# Patient Record
Sex: FEMALE | Race: WHITE | NOT HISPANIC OR LATINO | Employment: OTHER | URBAN - METROPOLITAN AREA
[De-identification: names, ages, dates, MRNs, and addresses within clinical notes are randomized per-mention and may not be internally consistent; named-entity substitution may affect disease eponyms.]

---

## 2023-08-12 ENCOUNTER — HOSPITAL ENCOUNTER (OUTPATIENT)
Dept: DATA CONVERSION | Facility: HOSPITAL | Age: 73
Discharge: HOME | End: 2023-08-12

## 2023-08-12 DIAGNOSIS — S93.402A SPRAIN OF UNSPECIFIED LIGAMENT OF LEFT ANKLE, INITIAL ENCOUNTER: ICD-10-CM

## 2023-08-12 DIAGNOSIS — M79.605 PAIN IN LEFT LEG: ICD-10-CM
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
## 2023-08-12 DIAGNOSIS — M25.572 PAIN IN LEFT ANKLE AND JOINTS OF LEFT FOOT: ICD-10-CM

## 2023-08-12 DIAGNOSIS — M25.552 PAIN IN LEFT HIP: ICD-10-CM

## 2023-08-12 DIAGNOSIS — F41.9 ANXIETY DISORDER, UNSPECIFIED: ICD-10-CM

## 2023-08-12 DIAGNOSIS — G43.909 MIGRAINE, UNSPECIFIED, NOT INTRACTABLE, WITHOUT STATUS MIGRAINOSUS: ICD-10-CM

## 2023-08-12 DIAGNOSIS — M25.561 PAIN IN RIGHT KNEE: ICD-10-CM

## 2023-08-12 DIAGNOSIS — E11.9 TYPE 2 DIABETES MELLITUS WITHOUT COMPLICATIONS (MULTI): ICD-10-CM

## 2023-08-12 DIAGNOSIS — M25.562 PAIN IN LEFT KNEE: ICD-10-CM

## 2023-08-21 ENCOUNTER — HOSPITAL ENCOUNTER (OUTPATIENT)
Dept: DATA CONVERSION | Facility: HOSPITAL | Age: 73
End: 2023-08-21

## 2023-08-21 DIAGNOSIS — M19.011 PRIMARY OSTEOARTHRITIS, RIGHT SHOULDER: ICD-10-CM

## 2023-08-24 ENCOUNTER — HOSPITAL ENCOUNTER (OUTPATIENT)
Dept: DATA CONVERSION | Facility: HOSPITAL | Age: 73
Discharge: HOME | End: 2023-08-24
Payer: MEDICARE

## 2023-08-24 DIAGNOSIS — M19.011 PRIMARY OSTEOARTHRITIS, RIGHT SHOULDER: ICD-10-CM

## 2023-09-13 ENCOUNTER — HOSPITAL ENCOUNTER (OUTPATIENT)
Dept: DATA CONVERSION | Facility: HOSPITAL | Age: 73
Discharge: HOME | End: 2023-09-13
Payer: MEDICARE

## 2023-09-13 DIAGNOSIS — R07.9 CHEST PAIN, UNSPECIFIED: ICD-10-CM

## 2023-09-13 DIAGNOSIS — R05.9 COUGH, UNSPECIFIED: ICD-10-CM

## 2023-09-14 ENCOUNTER — HOSPITAL ENCOUNTER (OUTPATIENT)
Dept: DATA CONVERSION | Facility: HOSPITAL | Age: 73
End: 2023-09-14

## 2023-09-14 DIAGNOSIS — R10.13 EPIGASTRIC PAIN: ICD-10-CM

## 2023-09-14 LAB
FLU A RESULT: NOT DETECTED
FLU B RESULT: NOT DETECTED
SARS-COV-2 RESULT: DETECTED

## 2023-09-25 ENCOUNTER — HOSPITAL ENCOUNTER (OUTPATIENT)
Dept: DATA CONVERSION | Facility: HOSPITAL | Age: 73
End: 2023-09-25

## 2023-09-25 DIAGNOSIS — M19.011 PRIMARY OSTEOARTHRITIS, RIGHT SHOULDER: ICD-10-CM

## 2023-09-27 PROBLEM — M19.011 ARTHRITIS OF RIGHT GLENOHUMERAL JOINT: Status: ACTIVE | Noted: 2023-09-27

## 2023-09-27 PROBLEM — F41.9 ANXIETY: Status: ACTIVE | Noted: 2023-09-27

## 2023-09-27 PROBLEM — M19.90 OSTEOARTHRITIS: Status: ACTIVE | Noted: 2023-09-27

## 2023-09-27 PROBLEM — G89.29 CHRONIC PAIN: Status: ACTIVE | Noted: 2023-09-27

## 2023-09-27 RX ORDER — LISINOPRIL 20 MG/1
20 TABLET ORAL DAILY
COMMUNITY
End: 2023-10-23

## 2023-09-27 RX ORDER — CYCLOBENZAPRINE HCL 5 MG
5 TABLET ORAL 3 TIMES DAILY PRN
COMMUNITY
Start: 2023-01-13

## 2023-09-27 RX ORDER — CYCLOSPORINE 0.5 MG/ML
1 EMULSION OPHTHALMIC 2 TIMES DAILY PRN
COMMUNITY
Start: 2023-09-08

## 2023-09-27 RX ORDER — OMEPRAZOLE 20 MG/1
20 TABLET, DELAYED RELEASE ORAL 2 TIMES DAILY
COMMUNITY

## 2023-09-27 RX ORDER — DICLOFENAC SODIUM 10 MG/G
4 GEL TOPICAL 4 TIMES DAILY PRN
COMMUNITY
Start: 2023-01-17

## 2023-09-27 RX ORDER — GABAPENTIN 800 MG/1
800 TABLET ORAL 3 TIMES DAILY
COMMUNITY
Start: 2023-08-22 | End: 2023-12-08 | Stop reason: ALTCHOICE

## 2023-09-27 RX ORDER — BISACODYL 10 MG/1
10 SUPPOSITORY RECTAL DAILY
COMMUNITY
Start: 2022-11-18 | End: 2024-01-26 | Stop reason: WASHOUT

## 2023-09-27 RX ORDER — PREGABALIN 75 MG/1
75 CAPSULE ORAL 3 TIMES DAILY
COMMUNITY
Start: 2023-02-03 | End: 2023-10-23 | Stop reason: ALTCHOICE

## 2023-09-27 RX ORDER — TOPIRAMATE 25 MG/1
TABLET ORAL
COMMUNITY
Start: 2023-05-25

## 2023-09-27 RX ORDER — TRAZODONE HYDROCHLORIDE 50 MG/1
50 TABLET ORAL NIGHTLY PRN
COMMUNITY
Start: 2022-10-02 | End: 2023-10-23 | Stop reason: ALTCHOICE

## 2023-09-27 RX ORDER — ESTRADIOL 0.1 MG/G
CREAM VAGINAL
COMMUNITY
Start: 2023-06-27

## 2023-09-27 RX ORDER — SERTRALINE HYDROCHLORIDE 50 MG/1
50 TABLET, FILM COATED ORAL DAILY
COMMUNITY
Start: 2023-09-06 | End: 2023-10-11

## 2023-09-27 RX ORDER — MELOXICAM 15 MG/1
15 TABLET ORAL DAILY
COMMUNITY
End: 2023-10-23 | Stop reason: ALTCHOICE

## 2023-09-27 RX ORDER — TIZANIDINE 4 MG/1
4 TABLET ORAL 2 TIMES DAILY PRN
Status: ON HOLD | COMMUNITY
Start: 2023-09-05 | End: 2023-11-03 | Stop reason: ALTCHOICE

## 2023-09-27 RX ORDER — PANTOPRAZOLE SODIUM 40 MG/1
40 TABLET, DELAYED RELEASE ORAL DAILY PRN
COMMUNITY
Start: 2023-06-16 | End: 2023-10-23 | Stop reason: ALTCHOICE

## 2023-09-27 RX ORDER — GABAPENTIN 300 MG/1
300 CAPSULE ORAL 2 TIMES DAILY
COMMUNITY
End: 2023-10-23 | Stop reason: ALTCHOICE

## 2023-09-27 RX ORDER — NARATRIPTAN 2.5 MG/1
2.5 TABLET ORAL ONCE AS NEEDED
COMMUNITY
Start: 2023-05-10

## 2023-09-29 ENCOUNTER — HOSPITAL ENCOUNTER (OUTPATIENT)
Dept: DATA CONVERSION | Facility: HOSPITAL | Age: 73
Discharge: HOME | End: 2023-09-29
Payer: MEDICARE

## 2023-09-29 DIAGNOSIS — R07.81 PLEURODYNIA: ICD-10-CM

## 2023-10-03 ENCOUNTER — HOSPITAL ENCOUNTER (OUTPATIENT)
Dept: RADIOLOGY | Facility: HOSPITAL | Age: 73
Discharge: HOME | End: 2023-10-03
Payer: MEDICARE

## 2023-10-03 DIAGNOSIS — R10.13 EPIGASTRIC PAIN: ICD-10-CM

## 2023-10-03 PROCEDURE — 2500000001 HC RX 250 WO HCPCS SELF ADMINISTERED DRUGS (ALT 637 FOR MEDICARE OP): Performed by: RADIOLOGY

## 2023-10-03 PROCEDURE — 74248 X-RAY SM INT F-THRU STD: CPT

## 2023-10-03 RX ADMIN — BARIUM SULFATE 355 ML: 960 POWDER, FOR SUSPENSION ORAL at 10:45

## 2023-10-06 DIAGNOSIS — F41.9 ANXIETY: ICD-10-CM

## 2023-10-10 NOTE — TELEPHONE ENCOUNTER
Rx Request received.   Rx needed [facility]  Pharmacy [facility] [place]  Last appointment 9/29/2023  PCP ARTEM WILKINS on 10/10/23 at 2:59 PM.

## 2023-10-11 RX ORDER — SERTRALINE HYDROCHLORIDE 50 MG/1
50 TABLET, FILM COATED ORAL DAILY
Qty: 90 TABLET | Refills: 1 | Status: SHIPPED | OUTPATIENT
Start: 2023-10-11 | End: 2024-03-22

## 2023-10-12 ENCOUNTER — APPOINTMENT (OUTPATIENT)
Dept: PHYSICAL THERAPY | Facility: CLINIC | Age: 73
End: 2023-10-12

## 2023-10-23 ENCOUNTER — LAB (OUTPATIENT)
Dept: LAB | Facility: LAB | Age: 73
End: 2023-10-23
Payer: MEDICARE

## 2023-10-23 ENCOUNTER — ANCILLARY PROCEDURE (OUTPATIENT)
Dept: PREADMISSION TESTING | Facility: HOSPITAL | Age: 73
End: 2023-10-23
Payer: MEDICARE

## 2023-10-23 VITALS
OXYGEN SATURATION: 95 % | WEIGHT: 150.79 LBS | HEART RATE: 100 BPM | TEMPERATURE: 96.8 F | BODY MASS INDEX: 26.72 KG/M2 | SYSTOLIC BLOOD PRESSURE: 138 MMHG | DIASTOLIC BLOOD PRESSURE: 107 MMHG | HEIGHT: 63 IN

## 2023-10-23 DIAGNOSIS — Z01.818 PREOPERATIVE TESTING: ICD-10-CM

## 2023-10-23 DIAGNOSIS — Z01.818 PREOPERATIVE TESTING: Primary | ICD-10-CM

## 2023-10-23 LAB
ANION GAP SERPL CALC-SCNC: 10 MMOL/L
APPEARANCE UR: ABNORMAL
APPEARANCE UR: ABNORMAL
BACTERIA #/AREA URNS AUTO: ABNORMAL /HPF
BACTERIA #/AREA URNS AUTO: ABNORMAL /HPF
BILIRUB UR STRIP.AUTO-MCNC: NEGATIVE MG/DL
BILIRUB UR STRIP.AUTO-MCNC: NEGATIVE MG/DL
BUN SERPL-MCNC: 26 MG/DL (ref 8–25)
CALCIUM SERPL-MCNC: 9.3 MG/DL (ref 8.5–10.4)
CAOX CRY #/AREA UR COMP ASSIST: ABNORMAL /HPF
CAOX CRY #/AREA UR COMP ASSIST: ABNORMAL /HPF
CHLORIDE SERPL-SCNC: 102 MMOL/L (ref 97–107)
CO2 SERPL-SCNC: 26 MMOL/L (ref 24–31)
COLOR UR: ABNORMAL
COLOR UR: ABNORMAL
CREAT SERPL-MCNC: 1 MG/DL (ref 0.4–1.6)
ERYTHROCYTE [DISTWIDTH] IN BLOOD BY AUTOMATED COUNT: 13.9 % (ref 11.5–14.5)
GFR SERPL CREATININE-BSD FRML MDRD: 59 ML/MIN/1.73M*2
GLUCOSE SERPL-MCNC: 89 MG/DL (ref 65–99)
GLUCOSE UR STRIP.AUTO-MCNC: NORMAL MG/DL
GLUCOSE UR STRIP.AUTO-MCNC: NORMAL MG/DL
HCT VFR BLD AUTO: 38.8 % (ref 36–46)
HGB BLD-MCNC: 12.3 G/DL (ref 12–16)
HOLD SPECIMEN: NORMAL
HYALINE CASTS #/AREA URNS AUTO: ABNORMAL /LPF
HYALINE CASTS #/AREA URNS AUTO: ABNORMAL /LPF
KETONES UR STRIP.AUTO-MCNC: NEGATIVE MG/DL
KETONES UR STRIP.AUTO-MCNC: NEGATIVE MG/DL
LEUKOCYTE ESTERASE UR QL STRIP.AUTO: ABNORMAL
LEUKOCYTE ESTERASE UR QL STRIP.AUTO: ABNORMAL
MCH RBC QN AUTO: 30.4 PG (ref 26–34)
MCHC RBC AUTO-ENTMCNC: 31.7 G/DL (ref 32–36)
MCV RBC AUTO: 96 FL (ref 80–100)
NITRITE UR QL STRIP.AUTO: ABNORMAL
NITRITE UR QL STRIP.AUTO: ABNORMAL
NRBC BLD-RTO: 0 /100 WBCS (ref 0–0)
PH UR STRIP.AUTO: 6 [PH]
PH UR STRIP.AUTO: 6 [PH]
PLATELET # BLD AUTO: 339 X10*3/UL (ref 150–450)
PMV BLD AUTO: 9.9 FL (ref 7.5–11.5)
POTASSIUM SERPL-SCNC: 4.7 MMOL/L (ref 3.4–5.1)
PROT UR STRIP.AUTO-MCNC: ABNORMAL MG/DL
PROT UR STRIP.AUTO-MCNC: ABNORMAL MG/DL
RBC # BLD AUTO: 4.04 X10*6/UL (ref 4–5.2)
RBC # UR STRIP.AUTO: NEGATIVE /UL
RBC # UR STRIP.AUTO: NEGATIVE /UL
RBC #/AREA URNS AUTO: ABNORMAL /HPF
RBC #/AREA URNS AUTO: ABNORMAL /HPF
SODIUM SERPL-SCNC: 138 MMOL/L (ref 133–145)
SP GR UR STRIP.AUTO: 1.02
SP GR UR STRIP.AUTO: 1.02
SQUAMOUS #/AREA URNS AUTO: ABNORMAL /HPF
SQUAMOUS #/AREA URNS AUTO: ABNORMAL /HPF
UROBILINOGEN UR STRIP.AUTO-MCNC: ABNORMAL MG/DL
UROBILINOGEN UR STRIP.AUTO-MCNC: NORMAL MG/DL
WBC # BLD AUTO: 6.2 X10*3/UL (ref 4.4–11.3)
WBC #/AREA URNS AUTO: >50 /HPF
WBC #/AREA URNS AUTO: >50 /HPF

## 2023-10-23 PROCEDURE — 81001 URINALYSIS AUTO W/SCOPE: CPT

## 2023-10-23 PROCEDURE — 85027 COMPLETE CBC AUTOMATED: CPT

## 2023-10-23 PROCEDURE — 80048 BASIC METABOLIC PNL TOTAL CA: CPT

## 2023-10-23 PROCEDURE — 87086 URINE CULTURE/COLONY COUNT: CPT | Mod: CMCLAB,WESLAB

## 2023-10-23 PROCEDURE — 36415 COLL VENOUS BLD VENIPUNCTURE: CPT

## 2023-10-23 PROCEDURE — 87081 CULTURE SCREEN ONLY: CPT | Mod: 59,CMCLAB,WESLAB

## 2023-10-23 PROCEDURE — 87186 SC STD MICRODIL/AGAR DIL: CPT | Mod: WESLAB

## 2023-10-23 PROCEDURE — 93005 ELECTROCARDIOGRAM TRACING: CPT

## 2023-10-23 PROCEDURE — 87081 CULTURE SCREEN ONLY: CPT | Mod: CMCLAB,WESLAB

## 2023-10-23 PROCEDURE — 87186 SC STD MICRODIL/AGAR DIL: CPT | Mod: WESLAB,59

## 2023-10-23 RX ORDER — CHOLECALCIFEROL (VITAMIN D3) 125 MCG
125 CAPSULE ORAL DAILY
COMMUNITY

## 2023-10-23 RX ORDER — ASCORBIC ACID 500 MG
500 TABLET ORAL DAILY
COMMUNITY

## 2023-10-23 RX ORDER — CHLORHEXIDINE GLUCONATE ORAL RINSE 1.2 MG/ML
15 SOLUTION DENTAL ONCE
Qty: 30 ML | Refills: 0 | Status: SHIPPED | OUTPATIENT
Start: 2023-10-23 | End: 2023-10-23

## 2023-10-23 RX ORDER — CYANOCOBALAMIN (VITAMIN B-12) 250 MCG
250 TABLET ORAL DAILY
COMMUNITY

## 2023-10-23 RX ORDER — BISMUTH SUBSALICYLATE 262 MG
1 TABLET,CHEWABLE ORAL DAILY
COMMUNITY

## 2023-10-23 RX ORDER — GLUCOSAM/CHONDRO/HERB 149/HYAL 750-100 MG
1000 TABLET ORAL DAILY
COMMUNITY

## 2023-10-23 ASSESSMENT — DUKE ACTIVITY SCORE INDEX (DASI)
CAN YOU PARTICIPATE IN MODERATE RECREATIONAL ACTIVITIES LIKE GOLF, BOWLING, DANCING, DOUBLES TENNIS OR THROWING A BASEBALL OR FOOTBALL: NO
CAN YOU DO LIGHT WORK AROUND THE HOUSE LIKE DUSTING OR WASHING DISHES: YES
CAN YOU DO YARD WORK LIKE RAKING LEAVES, WEEDING OR PUSHING A MOWER: YES
CAN YOU TAKE CARE OF YOURSELF (EAT, DRESS, BATHE, OR USE TOILET): YES
CAN YOU DO HEAVY WORK AROUND THE HOUSE LIKE SCRUBBING FLOORS OR LIFTING AND MOVING HEAVY FURNITURE: YES
CAN YOU CLIMB A FLIGHT OF STAIRS OR WALK UP A HILL: NO
CAN YOU DO MODERATE WORK AROUND THE HOUSE LIKE VACUUMING, SWEEPING FLOORS OR CARRYING GROCERIES: YES
CAN YOU HAVE SEXUAL RELATIONS: YES
CAN YOU WALK A BLOCK OR TWO ON LEVEL GROUND: YES
CAN YOU RUN A SHORT DISTANCE: NO
DASI METS SCORE: 6.6
TOTAL_SCORE: 31.2
CAN YOU PARTICIPATE IN STRENOUS SPORTS LIKE SWIMMING, SINGLES TENNIS, FOOTBALL, BASKETBALL, OR SKIING: NO
CAN YOU WALK INDOORS, SUCH AS AROUND YOUR HOUSE: YES

## 2023-10-23 ASSESSMENT — ENCOUNTER SYMPTOMS
NEUROLOGICAL NEGATIVE: 1
PSYCHIATRIC NEGATIVE: 1
CARDIOVASCULAR NEGATIVE: 1
RESPIRATORY NEGATIVE: 1
GASTROINTESTINAL NEGATIVE: 1
ENDOCRINE NEGATIVE: 1
EYES NEGATIVE: 1
ARTHRALGIAS: 1
ACTIVITY CHANGE: 1

## 2023-10-23 ASSESSMENT — CHADS2 SCORE
DIABETES: NO
AGE GREATER THAN OR EQUAL TO 75: YES
CHF: NO
CHADS2 SCORE: 1
PRIOR STROKE OR TIA OR THROMBOEMBOLISM: NO
HYPERTENSION: NO

## 2023-10-23 NOTE — PREPROCEDURE INSTRUCTIONS
Current Medications   Medication Instructions    ascorbic acid (Vitamin C) 500 mg tablet Stop 7 days before surgery    calcium citrate-vitamin D2 250 mg-2.5 mcg (100 unit) tablet Stop 7 days before surgery    cholecalciferol (Vitamin D-3) 125 MCG (5000 UT) capsule Stop 7 days before surgery    cyanocobalamin (Vitamin B-12) 250 mcg tablet Stop 7 days before surgery    cyclobenzaprine (Flexeril) 5 mg tablet Take morning of surgery with sip of water, no other fluids    diclofenac sodium (Voltaren) 1 % gel gel Stop 7 days before surgery    estradiol (Estrace) 0.01 % (0.1 mg/gram) vaginal cream Continue until night before surgery    gabapentin (Neurontin) 800 mg tablet Take morning of surgery with sip of water, no other fluids    multivitamin tablet Stop 7 days before surgery    naratriptan (Amerge) 2.5 mg tablet Continue until night before surgery    omega 3-dha-epa-fish oil (Fish OiL) 1,000 mg (120 mg-180 mg) capsule Stop 7 days before surgery    omeprazole OTC (PriLOSEC OTC) 20 mg EC tablet Take morning of surgery with sip of water, no other fluids    Restasis 0.05 % ophthalmic emulsion Take morning of surgery with sip of water, no other fluids    sertraline (Zoloft) 50 mg tablet Take morning of surgery with sip of water, no other fluids    tiZANidine (Zanaflex) 4 mg tablet Continue until night before surgery    topiramate (Topamax) 25 mg tablet Take morning of surgery with sip of water, no other fluids    TUMERIC-GING-OLIVE-OREG-CAPRYL ORAL Stop 7 days before surgery    [DISCONTINUED] traZODone (Desyrel) 50 mg tablet Patient no longer takes       AT DISCHARGE INSTRUCTIONS    Please call the Same Day Surgery (SDS) Department of the hospital where your procedure will be performed after 2:00 PM the day before your surgery. If you are scheduled on a Monday, or a Tuesday following a Monday holiday, you will need to call on the last business day prior to your surgery.    [address]  [place] [address]  [phone]    Please let your surgeon know if:      You develop any open sores, shingles, burning or painful urination as these may increase your risk of an infection.   You no longer wish to have the surgery.   Any other personal circumstances change that may lead to the need to cancel or defer this surgery-such as being sick or getting admitted to any hospital within one week of your planned procedure.    Your contact details change, such as a change of address or phone number.    Starting now:     Please DO NOT drink alcohol or smoke for 24 hours before surgery. It is well known that quitting smoking can make a huge difference to your health and recovery from surgery. The longer you abstain from smoking, the better your chances of a healthy recovery. If you need help with quitting, call 800-QUIT-NOW to be connected to a trained counselor who will discuss the best methods to help you quit.     Before your surgery:    Please stop all supplements 7 days prior to surgery. Or as directed by your surgeon.   Please stop taking NSAID pain medicine such as Advil and Motrin 5 days before surgery.    If you develop any fever, cough, cold, rashes, cuts, scratches, scrapes, urinary symptoms or infection anywhere on your body (including teeth and gums) prior to surgery, please call your surgeon’s office as soon as possible. This may require treatment to reduce the chance of cancellation on the day of surgery.    The day before your surgery:   DIET- Do not eat any solid food or drink anything after midnight the night before surgery. This includes gum, mints, hard candy and coffee.    Get a good night’s rest. Use the special soap for bathing if you have been instructed to use one.    Arrival time is typically 2 hours prior to the time of surgery.    Scheduled surgery times may change and you will be notified if this occurs - please check your personal voicemail for any updates.     On the morning of surgery:   Wear  comfortable, loose fitting clothes which open in the front. Please do not wear moisturizers, creams, lotions, makeup or perfume.    Please bring with you to surgery:   Photo ID and insurance card   Current list of medicines and allergies   Pacemaker/ Defibrillator/Heart stent cards   CPAP machine and mask    Slings/ splints/ crutches   A copy of your complete advanced directive/DHPOA.    Please do NOT bring with you to surgery:   All jewelry and valuables should be left at home.   Prosthetic devices such as contact lenses, hearing aids, dentures, eyelash extensions, hairpins and body piercings must be removed prior to going in to the surgical suite.    After outpatient surgery:   A responsible adult MUST accompany you at the time of discharge and stay with you for 24 hours after your surgery. You may NOT drive yourself home after surgery.    Do not drive, operate machinery, make critical decisions or do activities that require co-ordination or balance until after a night’s sleep.   Do not drink alcoholic beverages for 24 hours.   Instructions for resuming your medications will be provided by your surgeon.    CALL YOUR DOCTOR AFTER SURGERY IF YOU HAVE:     Chills and/or a fever of 101° F or higher.    Redness, swelling, pus or drainage from your surgical wound or a bad smell from the wound.    Lightheadedness, fainting or confusion.    Persistent vomiting (throwing up) and are not able to eat or drink for 12 hours.    Three or more loose, watery bowel movements in 24 hours (diarrhea).   Difficulty or pain while urinating( after non-urological surgery)    Pain and swelling in your legs, especially if it is only on one side.    Difficulty breathing or are breathing faster than normal.    Any new concerning symptoms.                NPO Instructions:    Do not eat any food after midnight the night before your surgery/procedure.    Additional Instructions:     Review your medication instructions, take indicated  medications  Wear  comfortable loose fitting clothing  Do not use moisturizers, creams, lotions or perfume  All jewelry and valuables should be left at home

## 2023-10-23 NOTE — CPM/PAT H&P
CPM/PAT Evaluation       Name: Pauline Orta (Pauline Orta)  /Age: 1950/73 y.o.     In-Person       Chief Complaint: Right shoulder arthritis    HPI  A 73-year-old female with right shoulder arthritis.  History of progressive radiating right shoulder pain and decreased range of motion over the past 5 years that has become more severe in the last year.  Pain radiates down right upper arm and symptoms increase with reaching or lifting motions interfering with sleep and ADLs.  Conservative treatments/injections not helping. Endorses numbness and tingling in right hand fingers. Denies fever or chills.  She is scheduled for right reverse total shoulder replacement with preop block.    Past Medical History:   Diagnosis Date    Adverse effect of anesthesia     woke during anesthesia    Anxiety     Arthritis     Cervical disc disease     Chronic pain disorder     GERD (gastroesophageal reflux disease)        Past Surgical History:   Procedure Laterality Date    CARPAL TUNNEL RELEASE Right     CATARACT EXTRACTION      CERVICAL FUSION      CERVICAL FUSION      GASTRIC BYPASS      HYSTERECTOMY      partial    MR HEAD ANGIO WO IV CONTRAST  10/14/2019    MR HEAD ANGIO WO IV CONTRAST [facility]    TRIGGER FINGER RELEASE Right          Allergies   Allergen Reactions    Aspirin Other     hx gastric bypass    Iodinated Contrast Media Hives     Patient states she gets benadryl prior to any scans using IV contrast    Lithium Unknown     Muscle twitch occurred over 30 years ago    Shellfish Derived Angioedema     35 years ago    Adhesive Other     Redness - Irritation    Caffeine Insomnia       Current Outpatient Medications   Medication Sig Dispense Refill    ascorbic acid (Vitamin C) 500 mg tablet Take 1 tablet (500 mg) by mouth once daily.      calcium citrate-vitamin D2 250 mg-2.5 mcg (100 unit) tablet Take 1 tablet by mouth once daily.      cholecalciferol (Vitamin D-3) 125 MCG (5000 UT) capsule Take  1 capsule (5,000 Units) by mouth once daily.      cyanocobalamin (Vitamin B-12) 250 mcg tablet Take 1 tablet (250 mcg) by mouth once daily.      cyclobenzaprine (Flexeril) 5 mg tablet Take 1 tablet (5 mg) by mouth 3 times a day as needed.      diclofenac sodium (Voltaren) 1 % gel gel Apply 1 Application topically 4 times a day as needed.      estradiol (Estrace) 0.01 % (0.1 mg/gram) vaginal cream INSERT 1 GRAM VAGINALLY 2 TIMES A WEEK      gabapentin (Neurontin) 800 mg tablet Take 1 tablet (800 mg) by mouth 3 times a day.      multivitamin tablet Take 1 tablet by mouth once daily.      naratriptan (Amerge) 2.5 mg tablet Take 1 tablet (2.5 mg) by mouth 1 time if needed for headaches.  ONSET OF HEADACHE. MAY REPEAT DOSE AFTER 4 HOURS IF DOES NOT RESOLVE. DO NOT EXCEED 5MG IN 24 HRS      omega 3-dha-epa-fish oil (Fish OiL) 1,000 mg (120 mg-180 mg) capsule Take 1 capsule (1,000 mg) by mouth once daily.      omeprazole OTC (PriLOSEC OTC) 20 mg EC tablet Take 1 tablet (20 mg) by mouth 2 times a day.      Restasis 0.05 % ophthalmic emulsion Administer 1 drop into both eyes 2 times a day as needed (dry eyes).      sertraline (Zoloft) 50 mg tablet Take 1 tablet (50 mg) by mouth once daily. 90 tablet 1    tiZANidine (Zanaflex) 4 mg tablet Take 1 tablet (4 mg) by mouth 2 times a day as needed.      topiramate (Topamax) 25 mg tablet       TUMERIC-GING-OLIVE-OREG-CAPRYL ORAL Take 1 mg by mouth once daily.      bisacodyl (Dulcolax) 10 mg suppository Insert 1 suppository (10 mg) into the rectum once daily.      chlorhexidine (Peridex) 0.12 % solution Use 15 mL in the mouth or throat 1 time for 1 dose. Use as directed night before surgery and morning before surgery 30 mL 0     No current facility-administered medications for this visit.       Review of Systems   Constitutional:  Positive for activity change.   HENT: Negative.     Eyes: Negative.         Glasses   Respiratory: Negative.     Cardiovascular: Negative.     Gastrointestinal: Negative.    Endocrine: Negative.    Genitourinary: Negative.    Musculoskeletal:  Positive for arthralgias.        Chronic right shoulder pain, decreased rom   Skin: Negative.    Neurological: Negative.    Psychiatric/Behavioral: Negative.          Physical Exam  Vitals reviewed.   Constitutional:       Appearance: Normal appearance.   HENT:      Head: Normocephalic and atraumatic.      Mouth/Throat:      Mouth: Mucous membranes are moist.   Eyes:      Pupils: Pupils are equal, round, and reactive to light.      Comments: glasses   Neck:      Comments: H/O cervical fusion-decreased rom  Cardiovascular:      Rate and Rhythm: Normal rate and regular rhythm.   Pulmonary:      Breath sounds: Normal breath sounds.   Abdominal:      Palpations: Abdomen is soft.   Musculoskeletal:         General: Tenderness present.      Comments: Right shoulder pain, decreased rom   Skin:     General: Skin is warm and dry.   Neurological:      Mental Status: She is alert and oriented to person, place, and time.   Psychiatric:         Mood and Affect: Mood normal.          PAT AIRWAY:   Airway:     Mallampati::  II    Neck ROM::  Limited   upper dentures and lower dentures      Visit Vitals  BP (!) 138/107 (BP Location: Left arm, Patient Position: Sitting)   Pulse 100   Temp 36 °C (96.8 °F) (Temporal)         CHADS2 Score: 1        Revised Cardiac Risk Index      Flowsheet Row Most Recent Value   Revised Cardiac Risk Calculator 1          Stop Bang Score      Flowsheet Row Most Recent Value   Do you snore loudly? 1   Do you often feel tired or fatigued after your sleep? 1   Has anyone ever observed you stop breathing in your sleep? 0   Do you have or are you being treated for high blood pressure? 1   Recent BMI (Calculated) 25.2   Is BMI greater than 35 kg/m2? 0=No   Age older than 50 years old? 1=Yes   Is your neck circumference greater than 17 inches (Male) or 16 inches (Female)? 0            Assessment and Plan:      Right shoulder arthritis Plan: Right reverse total shoulder replacement with preop block.  Chronic pain/back pain  Anxiety  Osteoarthritis  ASA II

## 2023-10-25 LAB — STAPHYLOCOCCUS SPEC CULT: ABNORMAL

## 2023-10-26 LAB — BACTERIA UR CULT: ABNORMAL

## 2023-10-29 DIAGNOSIS — G89.29 OTHER CHRONIC PAIN: ICD-10-CM

## 2023-10-30 ENCOUNTER — OFFICE VISIT (OUTPATIENT)
Dept: PAIN MEDICINE | Facility: CLINIC | Age: 73
End: 2023-10-30
Payer: MEDICARE

## 2023-10-30 VITALS — HEART RATE: 83 BPM | SYSTOLIC BLOOD PRESSURE: 86 MMHG | RESPIRATION RATE: 22 BRPM | DIASTOLIC BLOOD PRESSURE: 60 MMHG

## 2023-10-30 DIAGNOSIS — M25.572 ACUTE LEFT ANKLE PAIN: ICD-10-CM

## 2023-10-30 DIAGNOSIS — M79.7 FIBROMYALGIA: Primary | ICD-10-CM

## 2023-10-30 DIAGNOSIS — G89.29 CHRONIC RIGHT SHOULDER PAIN: ICD-10-CM

## 2023-10-30 DIAGNOSIS — M25.511 CHRONIC RIGHT SHOULDER PAIN: ICD-10-CM

## 2023-10-30 PROCEDURE — 99214 OFFICE O/P EST MOD 30 MIN: CPT | Performed by: ANESTHESIOLOGY

## 2023-10-30 PROCEDURE — 1036F TOBACCO NON-USER: CPT | Performed by: ANESTHESIOLOGY

## 2023-10-30 PROCEDURE — 1159F MED LIST DOCD IN RCRD: CPT | Performed by: ANESTHESIOLOGY

## 2023-10-30 PROCEDURE — 99204 OFFICE O/P NEW MOD 45 MIN: CPT | Performed by: ANESTHESIOLOGY

## 2023-10-30 RX ORDER — PREGABALIN 225 MG/1
225 CAPSULE ORAL 2 TIMES DAILY
Qty: 60 CAPSULE | Refills: 1 | Status: SHIPPED | OUTPATIENT
Start: 2023-10-30 | End: 2023-11-22

## 2023-10-30 RX ORDER — DULOXETIN HYDROCHLORIDE 30 MG/1
30 CAPSULE, DELAYED RELEASE ORAL DAILY
Qty: 30 CAPSULE | Refills: 1 | Status: SHIPPED | OUTPATIENT
Start: 2023-10-30 | End: 2023-12-27

## 2023-10-30 ASSESSMENT — ENCOUNTER SYMPTOMS
JOINT SWELLING: 1
NECK STIFFNESS: 1
MYALGIAS: 1
ACTIVITY CHANGE: 1
NECK PAIN: 1
ARTHRALGIAS: 1
BACK PAIN: 1

## 2023-10-30 ASSESSMENT — PATIENT HEALTH QUESTIONNAIRE - PHQ9
SUM OF ALL RESPONSES TO PHQ9 QUESTIONS 1 & 2: 0
1. LITTLE INTEREST OR PLEASURE IN DOING THINGS: NOT AT ALL
2. FEELING DOWN, DEPRESSED OR HOPELESS: NOT AT ALL

## 2023-10-30 ASSESSMENT — PAIN DESCRIPTION - DESCRIPTORS: DESCRIPTORS: SHARP

## 2023-10-30 ASSESSMENT — LIFESTYLE VARIABLES
TOTAL SCORE: 0
HOW MANY STANDARD DRINKS CONTAINING ALCOHOL DO YOU HAVE ON A TYPICAL DAY: PATIENT DOES NOT DRINK

## 2023-10-30 ASSESSMENT — PAIN - FUNCTIONAL ASSESSMENT: PAIN_FUNCTIONAL_ASSESSMENT: 0-10

## 2023-10-30 NOTE — PROGRESS NOTES
The patient is a 73-year-old female with neck pain, right shoulder pain, left ankle pain and diffuse muscular pain.  She has had spinal pain for at least 30 years.  The patient underwent 2 ACDF's greater than 20 years ago.  She has had continued back pain that radiates into the back of her head that is worse with range of motion.  She has benefited from diagnostic facet medial nerve branch blocks and radiofrequency ablation.  She last had the ablation a few years ago.  The patient would like to pursue this treatment again once her shoulder and ankle are dressed.  The patient is scheduled for reverse total shoulder replacement later this week.  Unfortunately the patient fractured her ankle a few weeks ago.  She is still experiencing pain and scheduled to see Dr. Gayle later today.  She also has been diagnosed with fibromyalgia.  The patient is taking gabapentin 800 mg 3 times per day.  The patient reports some relief with this medication without side effects.  She has never had duloxetine.    Review of Systems   Constitutional:  Positive for activity change.   Musculoskeletal:  Positive for arthralgias, back pain, gait problem, joint swelling, myalgias, neck pain and neck stiffness.   All other systems reviewed and are negative.    GENERAL: alert and appropriate, in no distress, well-hydrated, well nourished, interactive         SKIN: no rash noted, surgical scars are well-healed         HEAD: normocephalic, no abnormality or lesion noted         EYES: no injection and visual acuity is grossly normal         EARS: external ears normal, no mastoid tenderness         NOSE: external nose normal without rhinorrhea         OROPHARYNX: moist mucus membranes, no tonsillar hypertrophy/exudate, uvula midline and pharynx non-erythematous, lips, teeth and gums are without obvious lesion         NECK: Reduced ROM, no cervical LNs noted         RESPIRATORY: breathing non-labored and no grunting/flaring/retractions         CHEST:  equal chest rise with normal respiratory effort         ABDOMEN: soft and non-tender         BACK: back normal in appearance, cervical and lumbar spine with reduced ROM         EXTREMITIES: strength intact, right shoulder range of motion reduced and painful, boot on the left ankle         NEUROLOGIC: gait antalgic, SLR positive, David sign negative, Spurling sign reproduced pain, sensation grossly intact    Assessment and Plan    -Chronicity--chronic spinal and musculoskeletal pain    -Diagnostics--no new imaging ordered    -Pharmacologic--discontinue gabapentin and start pregabalin.  We discussed the mechanism of action of this medication as well as the side effect profile.  The patient may benefit from duloxetine.  We discussed the mechanism of action of this medication as well as the side effect profile.    -Psychologic--no need for psychologic intervention from my standpoint    -Physical--we discussed the importance of physical therapy and exercise.  We discussed avoidance and modification techniques.    -Intervention--no interventions planned at this time but we will consider repeating the cervical radiofrequency ablation in the future    I spent time educating the patient on the condition including the treatment and the prognosis.  I invited the patient to call at anytime with any questions.

## 2023-10-30 NOTE — PROGRESS NOTES
Patient has right shoulder pain.  She is scheduled for surgery this Friday at [facility].  She is meeting Dr Harris later today to discuss the surgery further.  She exacerbated an old left ankle fracture, and this will also be discussed.

## 2023-10-31 ENCOUNTER — PATIENT MESSAGE (OUTPATIENT)
Dept: PRIMARY CARE | Facility: CLINIC | Age: 73
End: 2023-10-31
Payer: MEDICARE

## 2023-10-31 DIAGNOSIS — R11.0 NAUSEA: Primary | ICD-10-CM

## 2023-10-31 RX ORDER — TIZANIDINE 2 MG/1
2 TABLET ORAL 3 TIMES DAILY PRN
Qty: 90 TABLET | Refills: 0 | Status: ON HOLD | OUTPATIENT
Start: 2023-10-31 | End: 2023-11-03 | Stop reason: ALTCHOICE

## 2023-11-02 ENCOUNTER — PREP FOR PROCEDURE (OUTPATIENT)
Dept: OPERATING ROOM | Facility: HOSPITAL | Age: 73
End: 2023-11-02
Payer: MEDICARE

## 2023-11-02 RX ORDER — ACETAMINOPHEN 500 MG
500 TABLET ORAL ONCE
Status: CANCELLED | OUTPATIENT
Start: 2023-11-02 | End: 2023-11-02

## 2023-11-02 RX ORDER — OXYCODONE HCL 10 MG/1
10 TABLET, FILM COATED, EXTENDED RELEASE ORAL ONCE
Status: CANCELLED | OUTPATIENT
Start: 2023-11-02 | End: 2023-11-02

## 2023-11-02 RX ORDER — ONDANSETRON 4 MG/1
4 TABLET, FILM COATED ORAL EVERY 8 HOURS PRN
Qty: 21 TABLET | Refills: 1 | Status: SHIPPED | OUTPATIENT
Start: 2023-11-02 | End: 2023-11-16

## 2023-11-02 RX ORDER — CELECOXIB 200 MG/1
400 CAPSULE ORAL ONCE
Status: CANCELLED | OUTPATIENT
Start: 2023-11-02 | End: 2023-11-02

## 2023-11-02 RX ORDER — CEFAZOLIN SODIUM 2 G/100ML
2 INJECTION, SOLUTION INTRAVENOUS ONCE
Status: CANCELLED | OUTPATIENT
Start: 2023-11-03

## 2023-11-02 RX ORDER — PREGABALIN 75 MG/1
75 CAPSULE ORAL ONCE
Status: CANCELLED | OUTPATIENT
Start: 2023-11-02 | End: 2023-11-02

## 2023-11-02 NOTE — H&P
History Of Present Illness  error  Past Medical History  Past Medical History:   Diagnosis Date    Adverse effect of anesthesia     woke during anesthesia    Anxiety     Arthritis     Cervical disc disease     Chronic pain disorder     GERD (gastroesophageal reflux disease)        Surgical History  Past Surgical History:   Procedure Laterality Date    CARPAL TUNNEL RELEASE Right     CATARACT EXTRACTION      CERVICAL FUSION      CERVICAL FUSION      GASTRIC BYPASS      HYSTERECTOMY      partial    MR HEAD ANGIO WO IV CONTRAST  10/14/2019    MR HEAD ANGIO WO IV CONTRAST [facility]    TRIGGER FINGER RELEASE Right         Social History  She reports that she quit smoking about 30 years ago. Her smoking use included cigarettes. She has never used smokeless tobacco. She reports that she does not drink alcohol and does not use drugs.    Family History  Family History   Problem Relation Name Age of Onset    Diabetes Mother      Cancer Father      Lung disease Father      Heart disease Father          Allergies  Aspirin, Iodinated contrast media, Lithium, Shellfish derived, Adhesive, and Caffeine    Review of Systems     Physical Exam     Last Recorded Vitals  There were no vitals taken for this visit.    Relevant Results             Assessment/Plan              I spent  minutes in the professional and overall care of this patient.      Tom Castillo MD

## 2023-11-03 ENCOUNTER — APPOINTMENT (OUTPATIENT)
Dept: RADIOLOGY | Facility: HOSPITAL | Age: 73
End: 2023-11-03
Payer: MEDICARE

## 2023-11-03 ENCOUNTER — HOSPITAL ENCOUNTER (OUTPATIENT)
Facility: HOSPITAL | Age: 73
Setting detail: OBSERVATION
Discharge: HOME | End: 2023-11-04
Attending: ORTHOPAEDIC SURGERY | Admitting: ORTHOPAEDIC SURGERY
Payer: MEDICARE

## 2023-11-03 ENCOUNTER — ANESTHESIA (OUTPATIENT)
Dept: OPERATING ROOM | Facility: HOSPITAL | Age: 73
End: 2023-11-03
Payer: MEDICARE

## 2023-11-03 ENCOUNTER — ANESTHESIA EVENT (OUTPATIENT)
Dept: OPERATING ROOM | Facility: HOSPITAL | Age: 73
End: 2023-11-03
Payer: MEDICARE

## 2023-11-03 ENCOUNTER — PHARMACY VISIT (OUTPATIENT)
Dept: PHARMACY | Facility: CLINIC | Age: 73
End: 2023-11-03
Payer: MEDICARE

## 2023-11-03 DIAGNOSIS — M19.011 ARTHRITIS OF RIGHT SHOULDER REGION: Primary | ICD-10-CM

## 2023-11-03 DIAGNOSIS — M19.019 SHOULDER ARTHRITIS: ICD-10-CM

## 2023-11-03 PROBLEM — K21.9 GASTROESOPHAGEAL REFLUX DISEASE: Status: ACTIVE | Noted: 2023-11-03

## 2023-11-03 PROBLEM — T88.53XA AWARENESS UNDER ANESTHESIA: Status: ACTIVE | Noted: 2023-11-03

## 2023-11-03 PROBLEM — R11.14 BILIOUS VOMITING WITH NAUSEA: Status: ACTIVE | Noted: 2023-11-03

## 2023-11-03 PROBLEM — R11.0 NAUSEA: Status: ACTIVE | Noted: 2023-11-03

## 2023-11-03 PROCEDURE — 2500000004 HC RX 250 GENERAL PHARMACY W/ HCPCS (ALT 636 FOR OP/ED)

## 2023-11-03 PROCEDURE — 2780000003 HC OR 278 NO HCPCS: Performed by: ORTHOPAEDIC SURGERY

## 2023-11-03 PROCEDURE — G0378 HOSPITAL OBSERVATION PER HR: HCPCS

## 2023-11-03 PROCEDURE — 2740000001 HC OR 274 NO HCPCS: Performed by: ORTHOPAEDIC SURGERY

## 2023-11-03 PROCEDURE — 96375 TX/PRO/DX INJ NEW DRUG ADDON: CPT | Mod: 59

## 2023-11-03 PROCEDURE — C1776 JOINT DEVICE (IMPLANTABLE): HCPCS | Performed by: ORTHOPAEDIC SURGERY

## 2023-11-03 PROCEDURE — 2500000005 HC RX 250 GENERAL PHARMACY W/O HCPCS: Performed by: NURSE ANESTHETIST, CERTIFIED REGISTERED

## 2023-11-03 PROCEDURE — L3670 SO ACRO/CLAV CAN WEB PRE OTS: HCPCS | Performed by: ORTHOPAEDIC SURGERY

## 2023-11-03 PROCEDURE — 2500000004 HC RX 250 GENERAL PHARMACY W/ HCPCS (ALT 636 FOR OP/ED): Performed by: ORTHOPAEDIC SURGERY

## 2023-11-03 PROCEDURE — 7100000001 HC RECOVERY ROOM TIME - INITIAL BASE CHARGE: Performed by: ORTHOPAEDIC SURGERY

## 2023-11-03 PROCEDURE — 3600000005 HC OR TIME - INITIAL BASE CHARGE - PROCEDURE LEVEL FIVE: Performed by: ORTHOPAEDIC SURGERY

## 2023-11-03 PROCEDURE — 3700000002 HC GENERAL ANESTHESIA TIME - EACH INCREMENTAL 1 MINUTE: Performed by: ORTHOPAEDIC SURGERY

## 2023-11-03 PROCEDURE — 2500000001 HC RX 250 WO HCPCS SELF ADMINISTERED DRUGS (ALT 637 FOR MEDICARE OP): Performed by: ORTHOPAEDIC SURGERY

## 2023-11-03 PROCEDURE — 3600000010 HC OR TIME - EACH INCREMENTAL 1 MINUTE - PROCEDURE LEVEL FIVE: Performed by: ORTHOPAEDIC SURGERY

## 2023-11-03 PROCEDURE — 2500000005 HC RX 250 GENERAL PHARMACY W/O HCPCS: Performed by: ORTHOPAEDIC SURGERY

## 2023-11-03 PROCEDURE — 2500000004 HC RX 250 GENERAL PHARMACY W/ HCPCS (ALT 636 FOR OP/ED): Performed by: ANESTHESIOLOGY

## 2023-11-03 PROCEDURE — A23472 PR RECONSTR TOTAL SHOULDER IMPLANT: Performed by: NURSE ANESTHETIST, CERTIFIED REGISTERED

## 2023-11-03 PROCEDURE — C1713 ANCHOR/SCREW BN/BN,TIS/BN: HCPCS | Performed by: ORTHOPAEDIC SURGERY

## 2023-11-03 PROCEDURE — 2500000001 HC RX 250 WO HCPCS SELF ADMINISTERED DRUGS (ALT 637 FOR MEDICARE OP): Performed by: NURSE PRACTITIONER

## 2023-11-03 PROCEDURE — 2720000007 HC OR 272 NO HCPCS: Performed by: ORTHOPAEDIC SURGERY

## 2023-11-03 PROCEDURE — 73030 X-RAY EXAM OF SHOULDER: CPT | Mod: RT,FY

## 2023-11-03 PROCEDURE — A23472 PR RECONSTR TOTAL SHOULDER IMPLANT: Performed by: ANESTHESIOLOGY

## 2023-11-03 PROCEDURE — 94762 N-INVAS EAR/PLS OXIMTRY CONT: CPT | Performed by: NURSE ANESTHETIST, CERTIFIED REGISTERED

## 2023-11-03 PROCEDURE — 96376 TX/PRO/DX INJ SAME DRUG ADON: CPT | Mod: 59

## 2023-11-03 PROCEDURE — 99100 ANES PT EXTEME AGE<1 YR&>70: CPT | Performed by: ANESTHESIOLOGY

## 2023-11-03 PROCEDURE — 96365 THER/PROPH/DIAG IV INF INIT: CPT | Mod: 59

## 2023-11-03 PROCEDURE — RXMED WILLOW AMBULATORY MEDICATION CHARGE

## 2023-11-03 PROCEDURE — 3700000001 HC GENERAL ANESTHESIA TIME - INITIAL BASE CHARGE: Performed by: ORTHOPAEDIC SURGERY

## 2023-11-03 PROCEDURE — 2500000004 HC RX 250 GENERAL PHARMACY W/ HCPCS (ALT 636 FOR OP/ED): Performed by: NURSE ANESTHETIST, CERTIFIED REGISTERED

## 2023-11-03 PROCEDURE — 7100000002 HC RECOVERY ROOM TIME - EACH INCREMENTAL 1 MINUTE: Performed by: ORTHOPAEDIC SURGERY

## 2023-11-03 DEVICE — UNIVERS REVERS SUTURE CUP, 33 (+2 RIGHT)
Type: IMPLANTABLE DEVICE | Site: SHOULDER | Status: FUNCTIONAL
Brand: ARTHREX®

## 2023-11-03 DEVICE — DYNANITE VIP GLENOID PIN, NITINOL, 2.8MM
Type: IMPLANTABLE DEVICE | Site: SHOULDER | Status: FUNCTIONAL
Brand: ARTHREX®

## 2023-11-03 DEVICE — IMPLANTABLE DEVICE: Type: IMPLANTABLE DEVICE | Site: SHOULDER | Status: FUNCTIONAL

## 2023-11-03 DEVICE — 24MM +4 LAT BASEPLATE, MODULAR
Type: IMPLANTABLE DEVICE | Site: SHOULDER | Status: FUNCTIONAL
Brand: ARTHREX®

## 2023-11-03 DEVICE — 5.5X28MM PERIPHERAL SCREW, LOCKING
Type: IMPLANTABLE DEVICE | Site: SHOULDER | Status: FUNCTIONAL
Brand: ARTHREX®

## 2023-11-03 DEVICE — 33/24 GLENOSPHERE
Type: IMPLANTABLE DEVICE | Site: SHOULDER | Status: FUNCTIONAL
Brand: ARTHREX®

## 2023-11-03 DEVICE — 5.5X36MM PERIPHERAL SCREW, LOCKING
Type: IMPLANTABLE DEVICE | Site: SHOULDER | Status: FUNCTIONAL
Brand: ARTHREX®

## 2023-11-03 RX ORDER — CEFAZOLIN SODIUM 2 G/100ML
2 INJECTION, SOLUTION INTRAVENOUS ONCE
Status: COMPLETED | OUTPATIENT
Start: 2023-11-03 | End: 2023-11-03

## 2023-11-03 RX ORDER — ONDANSETRON HYDROCHLORIDE 2 MG/ML
INJECTION, SOLUTION INTRAVENOUS AS NEEDED
Status: DISCONTINUED | OUTPATIENT
Start: 2023-11-03 | End: 2023-11-03

## 2023-11-03 RX ORDER — DEXAMETHASONE SODIUM PHOSPHATE 4 MG/ML
INJECTION, SOLUTION INTRA-ARTICULAR; INTRALESIONAL; INTRAMUSCULAR; INTRAVENOUS; SOFT TISSUE AS NEEDED
Status: DISCONTINUED | OUTPATIENT
Start: 2023-11-03 | End: 2023-11-03

## 2023-11-03 RX ORDER — OXYCODONE AND ACETAMINOPHEN 5; 325 MG/1; MG/1
1 TABLET ORAL EVERY 6 HOURS PRN
Qty: 25 TABLET | Refills: 0 | Status: SHIPPED | OUTPATIENT
Start: 2023-11-03 | End: 2024-03-08 | Stop reason: WASHOUT

## 2023-11-03 RX ORDER — DULOXETIN HYDROCHLORIDE 30 MG/1
30 CAPSULE, DELAYED RELEASE ORAL DAILY
Status: DISCONTINUED | OUTPATIENT
Start: 2023-11-04 | End: 2023-11-04 | Stop reason: HOSPADM

## 2023-11-03 RX ORDER — NALOXONE HYDROCHLORIDE 0.4 MG/ML
0.2 INJECTION, SOLUTION INTRAMUSCULAR; INTRAVENOUS; SUBCUTANEOUS EVERY 5 MIN PRN
Status: DISCONTINUED | OUTPATIENT
Start: 2023-11-03 | End: 2023-11-04 | Stop reason: HOSPADM

## 2023-11-03 RX ORDER — SERTRALINE HYDROCHLORIDE 50 MG/1
50 TABLET, FILM COATED ORAL DAILY
Status: DISCONTINUED | OUTPATIENT
Start: 2023-11-04 | End: 2023-11-04 | Stop reason: HOSPADM

## 2023-11-03 RX ORDER — FENTANYL CITRATE 50 UG/ML
50 INJECTION, SOLUTION INTRAMUSCULAR; INTRAVENOUS ONCE AS NEEDED
Status: COMPLETED | OUTPATIENT
Start: 2023-11-03 | End: 2023-11-03

## 2023-11-03 RX ORDER — PREGABALIN 75 MG/1
75 CAPSULE ORAL ONCE
Status: DISCONTINUED | OUTPATIENT
Start: 2023-11-03 | End: 2023-11-03 | Stop reason: HOSPADM

## 2023-11-03 RX ORDER — POLYETHYLENE GLYCOL 3350 17 G/17G
17 POWDER, FOR SOLUTION ORAL DAILY
Status: DISCONTINUED | OUTPATIENT
Start: 2023-11-03 | End: 2023-11-04 | Stop reason: HOSPADM

## 2023-11-03 RX ORDER — ONDANSETRON HYDROCHLORIDE 2 MG/ML
4 INJECTION, SOLUTION INTRAVENOUS EVERY 8 HOURS PRN
Status: DISCONTINUED | OUTPATIENT
Start: 2023-11-03 | End: 2023-11-04 | Stop reason: HOSPADM

## 2023-11-03 RX ORDER — CHOLECALCIFEROL (VITAMIN D3) 125 MCG
5000 CAPSULE ORAL DAILY
Status: DISCONTINUED | OUTPATIENT
Start: 2023-11-03 | End: 2023-11-04 | Stop reason: HOSPADM

## 2023-11-03 RX ORDER — PHENYLEPHRINE HCL IN 0.9% NACL 0.4MG/10ML
SYRINGE (ML) INTRAVENOUS AS NEEDED
Status: DISCONTINUED | OUTPATIENT
Start: 2023-11-03 | End: 2023-11-03

## 2023-11-03 RX ORDER — VANCOMYCIN HYDROCHLORIDE 1 G/20ML
INJECTION, POWDER, LYOPHILIZED, FOR SOLUTION INTRAVENOUS AS NEEDED
Status: DISCONTINUED | OUTPATIENT
Start: 2023-11-03 | End: 2023-11-03 | Stop reason: HOSPADM

## 2023-11-03 RX ORDER — LIDOCAINE HYDROCHLORIDE 20 MG/ML
INJECTION, SOLUTION INFILTRATION; PERINEURAL AS NEEDED
Status: DISCONTINUED | OUTPATIENT
Start: 2023-11-03 | End: 2023-11-03

## 2023-11-03 RX ORDER — OXYCODONE AND ACETAMINOPHEN 5; 325 MG/1; MG/1
1 TABLET ORAL EVERY 4 HOURS PRN
Status: DISCONTINUED | OUTPATIENT
Start: 2023-11-03 | End: 2023-11-04 | Stop reason: HOSPADM

## 2023-11-03 RX ORDER — ACETAMINOPHEN 325 MG/1
650 TABLET ORAL EVERY 6 HOURS SCHEDULED
Status: DISCONTINUED | OUTPATIENT
Start: 2023-11-03 | End: 2023-11-04 | Stop reason: HOSPADM

## 2023-11-03 RX ORDER — FENTANYL CITRATE 50 UG/ML
INJECTION, SOLUTION INTRAMUSCULAR; INTRAVENOUS AS NEEDED
Status: DISCONTINUED | OUTPATIENT
Start: 2023-11-03 | End: 2023-11-03

## 2023-11-03 RX ORDER — SODIUM CHLORIDE, SODIUM LACTATE, POTASSIUM CHLORIDE, CALCIUM CHLORIDE 600; 310; 30; 20 MG/100ML; MG/100ML; MG/100ML; MG/100ML
50 INJECTION, SOLUTION INTRAVENOUS CONTINUOUS
Status: DISCONTINUED | OUTPATIENT
Start: 2023-11-03 | End: 2023-11-03 | Stop reason: HOSPADM

## 2023-11-03 RX ORDER — CELECOXIB 200 MG/1
400 CAPSULE ORAL ONCE
Status: COMPLETED | OUTPATIENT
Start: 2023-11-03 | End: 2023-11-03

## 2023-11-03 RX ORDER — UBIDECARENONE 75 MG
250 CAPSULE ORAL DAILY
Status: DISCONTINUED | OUTPATIENT
Start: 2023-11-03 | End: 2023-11-04 | Stop reason: HOSPADM

## 2023-11-03 RX ORDER — NALOXONE HYDROCHLORIDE 0.4 MG/ML
0.2 INJECTION, SOLUTION INTRAMUSCULAR; INTRAVENOUS; SUBCUTANEOUS EVERY 5 MIN PRN
Status: DISCONTINUED | OUTPATIENT
Start: 2023-11-03 | End: 2023-11-03

## 2023-11-03 RX ORDER — HYDRALAZINE HYDROCHLORIDE 20 MG/ML
5 INJECTION INTRAMUSCULAR; INTRAVENOUS EVERY 30 MIN PRN
Status: DISCONTINUED | OUTPATIENT
Start: 2023-11-03 | End: 2023-11-03 | Stop reason: HOSPADM

## 2023-11-03 RX ORDER — OXYCODONE HCL 10 MG/1
10 TABLET, FILM COATED, EXTENDED RELEASE ORAL ONCE
Status: COMPLETED | OUTPATIENT
Start: 2023-11-03 | End: 2023-11-03

## 2023-11-03 RX ORDER — POLYETHYLENE GLYCOL 3350 17 G/17G
17 POWDER, FOR SOLUTION ORAL DAILY
COMMUNITY

## 2023-11-03 RX ORDER — TOPIRAMATE 25 MG/1
25 TABLET ORAL DAILY
Status: DISCONTINUED | OUTPATIENT
Start: 2023-11-03 | End: 2023-11-04 | Stop reason: HOSPADM

## 2023-11-03 RX ORDER — ASCORBIC ACID 500 MG
500 TABLET ORAL DAILY
Status: DISCONTINUED | OUTPATIENT
Start: 2023-11-03 | End: 2023-11-04 | Stop reason: HOSPADM

## 2023-11-03 RX ORDER — ONDANSETRON 4 MG/1
4 TABLET, ORALLY DISINTEGRATING ORAL EVERY 8 HOURS PRN
Status: DISCONTINUED | OUTPATIENT
Start: 2023-11-03 | End: 2023-11-04 | Stop reason: HOSPADM

## 2023-11-03 RX ORDER — PROPOFOL 10 MG/ML
INJECTION, EMULSION INTRAVENOUS AS NEEDED
Status: DISCONTINUED | OUTPATIENT
Start: 2023-11-03 | End: 2023-11-03

## 2023-11-03 RX ORDER — MUPIROCIN 20 MG/G
OINTMENT TOPICAL 2 TIMES DAILY
Status: DISCONTINUED | OUTPATIENT
Start: 2023-11-03 | End: 2023-11-04 | Stop reason: HOSPADM

## 2023-11-03 RX ORDER — VANCOMYCIN HYDROCHLORIDE 1 G/20ML
INJECTION, POWDER, LYOPHILIZED, FOR SOLUTION INTRAVENOUS AS NEEDED
Status: DISCONTINUED | OUTPATIENT
Start: 2023-11-03 | End: 2023-11-03

## 2023-11-03 RX ORDER — MIDAZOLAM HYDROCHLORIDE 1 MG/ML
1 INJECTION INTRAMUSCULAR; INTRAVENOUS ONCE AS NEEDED
Status: COMPLETED | OUTPATIENT
Start: 2023-11-03 | End: 2023-11-03

## 2023-11-03 RX ORDER — CEFAZOLIN SODIUM 2 G/100ML
2 INJECTION, SOLUTION INTRAVENOUS EVERY 8 HOURS
Status: COMPLETED | OUTPATIENT
Start: 2023-11-03 | End: 2023-11-04

## 2023-11-03 RX ORDER — SODIUM CHLORIDE, SODIUM LACTATE, POTASSIUM CHLORIDE, CALCIUM CHLORIDE 600; 310; 30; 20 MG/100ML; MG/100ML; MG/100ML; MG/100ML
50 INJECTION, SOLUTION INTRAVENOUS CONTINUOUS
Status: DISCONTINUED | OUTPATIENT
Start: 2023-11-03 | End: 2023-11-04 | Stop reason: HOSPADM

## 2023-11-03 RX ORDER — FENTANYL CITRATE 50 UG/ML
50 INJECTION, SOLUTION INTRAMUSCULAR; INTRAVENOUS EVERY 5 MIN PRN
Status: DISCONTINUED | OUTPATIENT
Start: 2023-11-03 | End: 2023-11-03 | Stop reason: HOSPADM

## 2023-11-03 RX ORDER — LABETALOL HYDROCHLORIDE 5 MG/ML
5 INJECTION, SOLUTION INTRAVENOUS ONCE AS NEEDED
Status: DISCONTINUED | OUTPATIENT
Start: 2023-11-03 | End: 2023-11-03 | Stop reason: HOSPADM

## 2023-11-03 RX ORDER — ROCURONIUM BROMIDE 10 MG/ML
INJECTION, SOLUTION INTRAVENOUS AS NEEDED
Status: DISCONTINUED | OUTPATIENT
Start: 2023-11-03 | End: 2023-11-03

## 2023-11-03 RX ORDER — ACETAMINOPHEN 500 MG
500 TABLET ORAL ONCE
Status: COMPLETED | OUTPATIENT
Start: 2023-11-03 | End: 2023-11-03

## 2023-11-03 RX ADMIN — FENTANYL CITRATE 25 MCG: 0.05 INJECTION, SOLUTION INTRAMUSCULAR; INTRAVENOUS at 12:35

## 2023-11-03 RX ADMIN — Medication 100 MCG: at 11:40

## 2023-11-03 RX ADMIN — ACETAMINOPHEN 500 MG: 500 TABLET ORAL at 09:15

## 2023-11-03 RX ADMIN — FENTANYL CITRATE 50 MCG: 50 INJECTION INTRAMUSCULAR; INTRAVENOUS at 10:02

## 2023-11-03 RX ADMIN — CEFAZOLIN SODIUM 2 G: 2 INJECTION, SOLUTION INTRAVENOUS at 23:43

## 2023-11-03 RX ADMIN — ONDANSETRON HYDROCHLORIDE 4 MG: 2 INJECTION INTRAMUSCULAR; INTRAVENOUS at 11:09

## 2023-11-03 RX ADMIN — MIDAZOLAM HYDROCHLORIDE 1 MG: 1 INJECTION INTRAMUSCULAR; INTRAVENOUS at 10:03

## 2023-11-03 RX ADMIN — CYANOCOBALAMIN TAB 500 MCG 250 MCG: 500 TAB at 16:38

## 2023-11-03 RX ADMIN — OXYCODONE HYDROCHLORIDE AND ACETAMINOPHEN 500 MG: 500 TABLET ORAL at 16:37

## 2023-11-03 RX ADMIN — FENTANYL CITRATE 25 MCG: 0.05 INJECTION, SOLUTION INTRAMUSCULAR; INTRAVENOUS at 12:37

## 2023-11-03 RX ADMIN — OXYCODONE HYDROCHLORIDE 10 MG: 10 TABLET, FILM COATED, EXTENDED RELEASE ORAL at 09:15

## 2023-11-03 RX ADMIN — CEFAZOLIN SODIUM 2 G: 2 INJECTION, SOLUTION INTRAVENOUS at 10:53

## 2023-11-03 RX ADMIN — Medication 80 MCG: at 10:55

## 2023-11-03 RX ADMIN — OXYCODONE AND ACETAMINOPHEN 1 TABLET: 5; 325 TABLET ORAL at 23:42

## 2023-11-03 RX ADMIN — FENTANYL CITRATE 50 MCG: 0.05 INJECTION, SOLUTION INTRAMUSCULAR; INTRAVENOUS at 13:20

## 2023-11-03 RX ADMIN — FENTANYL CITRATE 25 MCG: 0.05 INJECTION, SOLUTION INTRAMUSCULAR; INTRAVENOUS at 10:42

## 2023-11-03 RX ADMIN — VANCOMYCIN HYDROCHLORIDE 1000 MG: 1 INJECTION, POWDER, LYOPHILIZED, FOR SOLUTION INTRAVENOUS at 11:06

## 2023-11-03 RX ADMIN — ROCURONIUM BROMIDE 50 MG: 10 INJECTION, SOLUTION INTRAVENOUS at 10:45

## 2023-11-03 RX ADMIN — FENTANYL CITRATE 25 MCG: 0.05 INJECTION, SOLUTION INTRAMUSCULAR; INTRAVENOUS at 12:34

## 2023-11-03 RX ADMIN — Medication 5000 UNITS: at 16:38

## 2023-11-03 RX ADMIN — ACETAMINOPHEN 650 MG: 325 TABLET ORAL at 20:33

## 2023-11-03 RX ADMIN — CELECOXIB 400 MG: 200 CAPSULE ORAL at 09:15

## 2023-11-03 RX ADMIN — LIDOCAINE HYDROCHLORIDE 50 MG: 20 INJECTION, SOLUTION INFILTRATION; PERINEURAL at 10:42

## 2023-11-03 RX ADMIN — SODIUM CHLORIDE, SODIUM LACTATE, POTASSIUM CHLORIDE, AND CALCIUM CHLORIDE: 600; 310; 30; 20 INJECTION, SOLUTION INTRAVENOUS at 09:43

## 2023-11-03 RX ADMIN — ACETAMINOPHEN 650 MG: 325 TABLET ORAL at 23:41

## 2023-11-03 RX ADMIN — SUGAMMADEX 200 MG: 100 INJECTION, SOLUTION INTRAVENOUS at 12:18

## 2023-11-03 RX ADMIN — HYDROMORPHONE HYDROCHLORIDE 0.5 MG: 0.5 INJECTION, SOLUTION INTRAMUSCULAR; INTRAVENOUS; SUBCUTANEOUS at 15:43

## 2023-11-03 RX ADMIN — MUPIROCIN: 20 OINTMENT TOPICAL at 22:18

## 2023-11-03 RX ADMIN — PROMETHAZINE HYDROCHLORIDE 6.25 MG: 25 INJECTION INTRAMUSCULAR; INTRAVENOUS at 12:49

## 2023-11-03 RX ADMIN — POVIDONE-IODINE 1 APPLICATION: 5 SOLUTION TOPICAL at 09:14

## 2023-11-03 RX ADMIN — DEXAMETHASONE SODIUM PHOSPHATE 8 MG: 4 INJECTION, SOLUTION INTRA-ARTICULAR; INTRALESIONAL; INTRAMUSCULAR; INTRAVENOUS; SOFT TISSUE at 11:00

## 2023-11-03 RX ADMIN — SODIUM CHLORIDE, SODIUM LACTATE, POTASSIUM CHLORIDE, AND CALCIUM CHLORIDE: 600; 310; 30; 20 INJECTION, SOLUTION INTRAVENOUS at 11:48

## 2023-11-03 RX ADMIN — TOPIRAMATE 25 MG: 25 TABLET, FILM COATED ORAL at 16:38

## 2023-11-03 RX ADMIN — CEFAZOLIN SODIUM 2 G: 2 INJECTION, SOLUTION INTRAVENOUS at 16:36

## 2023-11-03 RX ADMIN — HYDROMORPHONE HYDROCHLORIDE 0.5 MG: 0.5 INJECTION, SOLUTION INTRAMUSCULAR; INTRAVENOUS; SUBCUTANEOUS at 20:43

## 2023-11-03 RX ADMIN — SODIUM CHLORIDE, SODIUM LACTATE, POTASSIUM CHLORIDE, AND CALCIUM CHLORIDE 50 ML/HR: 600; 310; 30; 20 INJECTION, SOLUTION INTRAVENOUS at 15:43

## 2023-11-03 RX ADMIN — FENTANYL CITRATE 50 MCG: 0.05 INJECTION, SOLUTION INTRAMUSCULAR; INTRAVENOUS at 12:49

## 2023-11-03 RX ADMIN — Medication 100 MCG: at 11:25

## 2023-11-03 RX ADMIN — PREGABALIN 225 MG: 75 CAPSULE ORAL at 20:34

## 2023-11-03 RX ADMIN — PROPOFOL 130 MG: 10 INJECTION, EMULSION INTRAVENOUS at 10:43

## 2023-11-03 SDOH — SOCIAL STABILITY: SOCIAL INSECURITY: HAVE YOU HAD THOUGHTS OF HARMING ANYONE ELSE?: NO

## 2023-11-03 SDOH — SOCIAL STABILITY: SOCIAL INSECURITY: DO YOU FEEL UNSAFE GOING BACK TO THE PLACE WHERE YOU ARE LIVING?: NO

## 2023-11-03 SDOH — SOCIAL STABILITY: SOCIAL INSECURITY: ABUSE: ADULT

## 2023-11-03 SDOH — SOCIAL STABILITY: SOCIAL INSECURITY: HAS ANYONE EVER THREATENED TO HURT YOUR FAMILY OR YOUR PETS?: NO

## 2023-11-03 SDOH — SOCIAL STABILITY: SOCIAL INSECURITY: DOES ANYONE TRY TO KEEP YOU FROM HAVING/CONTACTING OTHER FRIENDS OR DOING THINGS OUTSIDE YOUR HOME?: NO

## 2023-11-03 SDOH — SOCIAL STABILITY: SOCIAL INSECURITY: ARE YOU OR HAVE YOU BEEN THREATENED OR ABUSED PHYSICALLY, EMOTIONALLY, OR SEXUALLY BY ANYONE?: NO

## 2023-11-03 SDOH — SOCIAL STABILITY: SOCIAL INSECURITY: WERE YOU ABLE TO COMPLETE ALL THE BEHAVIORAL HEALTH SCREENINGS?: YES

## 2023-11-03 SDOH — HEALTH STABILITY: MENTAL HEALTH: CURRENT SMOKER: 0

## 2023-11-03 SDOH — SOCIAL STABILITY: SOCIAL INSECURITY: ARE THERE ANY APPARENT SIGNS OF INJURIES/BEHAVIORS THAT COULD BE RELATED TO ABUSE/NEGLECT?: NO

## 2023-11-03 SDOH — SOCIAL STABILITY: SOCIAL INSECURITY: DO YOU FEEL ANYONE HAS EXPLOITED OR TAKEN ADVANTAGE OF YOU FINANCIALLY OR OF YOUR PERSONAL PROPERTY?: NO

## 2023-11-03 ASSESSMENT — COGNITIVE AND FUNCTIONAL STATUS - GENERAL
WALKING IN HOSPITAL ROOM: A LOT
DAILY ACTIVITIY SCORE: 12
STANDING UP FROM CHAIR USING ARMS: A LITTLE
MOBILITY SCORE: 16
MOVING FROM LYING ON BACK TO SITTING ON SIDE OF FLAT BED WITH BEDRAILS: A LITTLE
TOILETING: A LOT
MOVING TO AND FROM BED TO CHAIR: A LITTLE
EATING MEALS: A LOT
CLIMB 3 TO 5 STEPS WITH RAILING: A LOT
DRESSING REGULAR UPPER BODY CLOTHING: A LOT
DRESSING REGULAR LOWER BODY CLOTHING: A LOT
TURNING FROM BACK TO SIDE WHILE IN FLAT BAD: A LITTLE
PERSONAL GROOMING: A LOT
HELP NEEDED FOR BATHING: A LOT
PATIENT BASELINE BEDBOUND: NO

## 2023-11-03 ASSESSMENT — PATIENT HEALTH QUESTIONNAIRE - PHQ9
1. LITTLE INTEREST OR PLEASURE IN DOING THINGS: NOT AT ALL
SUM OF ALL RESPONSES TO PHQ9 QUESTIONS 1 & 2: 0
2. FEELING DOWN, DEPRESSED OR HOPELESS: NOT AT ALL

## 2023-11-03 ASSESSMENT — PAIN SCALES - GENERAL
PAINLEVEL_OUTOF10: 10 - WORST POSSIBLE PAIN
PAINLEVEL_OUTOF10: 7
PAINLEVEL_OUTOF10: 0 - NO PAIN
PAINLEVEL_OUTOF10: 7
PAINLEVEL_OUTOF10: 10 - WORST POSSIBLE PAIN
PAINLEVEL_OUTOF10: 3
PAINLEVEL_OUTOF10: 9
PAINLEVEL_OUTOF10: 7
PAINLEVEL_OUTOF10: 6
PAINLEVEL_OUTOF10: 7
PAINLEVEL_OUTOF10: 8

## 2023-11-03 ASSESSMENT — ACTIVITIES OF DAILY LIVING (ADL)
JUDGMENT_ADEQUATE_SAFELY_COMPLETE_DAILY_ACTIVITIES: YES
HEARING - LEFT EAR: FUNCTIONAL
GROOMING: NEEDS ASSISTANCE
ADEQUATE_TO_COMPLETE_ADL: YES
ADEQUATE_TO_COMPLETE_ADL: YES
GROOMING: NEEDS ASSISTANCE
PATIENT'S MEMORY ADEQUATE TO SAFELY COMPLETE DAILY ACTIVITIES?: YES
DRESSING YOURSELF: NEEDS ASSISTANCE
JUDGMENT_ADEQUATE_SAFELY_COMPLETE_DAILY_ACTIVITIES: YES
HEARING - LEFT EAR: FUNCTIONAL
FEEDING YOURSELF: INDEPENDENT
HEARING - RIGHT EAR: FUNCTIONAL
DRESSING YOURSELF: NEEDS ASSISTANCE
BATHING: NEEDS ASSISTANCE
WALKS IN HOME: NEEDS ASSISTANCE
TOILETING: INDEPENDENT
PATIENT'S MEMORY ADEQUATE TO SAFELY COMPLETE DAILY ACTIVITIES?: YES
WALKS IN HOME: NEEDS ASSISTANCE
FEEDING YOURSELF: INDEPENDENT
BATHING: NEEDS ASSISTANCE

## 2023-11-03 ASSESSMENT — PAIN - FUNCTIONAL ASSESSMENT
PAIN_FUNCTIONAL_ASSESSMENT: 0-10

## 2023-11-03 ASSESSMENT — COLUMBIA-SUICIDE SEVERITY RATING SCALE - C-SSRS
1. IN THE PAST MONTH, HAVE YOU WISHED YOU WERE DEAD OR WISHED YOU COULD GO TO SLEEP AND NOT WAKE UP?: NO
6. HAVE YOU EVER DONE ANYTHING, STARTED TO DO ANYTHING, OR PREPARED TO DO ANYTHING TO END YOUR LIFE?: NO
2. HAVE YOU ACTUALLY HAD ANY THOUGHTS OF KILLING YOURSELF?: NO

## 2023-11-03 ASSESSMENT — LIFESTYLE VARIABLES: SUBSTANCE_ABUSE_PAST_12_MONTHS: NO

## 2023-11-03 ASSESSMENT — PAIN DESCRIPTION - DESCRIPTORS: DESCRIPTORS: ACHING;THROBBING

## 2023-11-03 NOTE — ANESTHESIA PREPROCEDURE EVALUATION
Patient: Pauline Orta    Procedure Information       Date/Time: 11/03/23 1030    Procedure: RIGHT REVERSE TOTAL SHOULDER REPLACEMENT (Right: Shoulder) - (ARTHREX VIP) **PREOP BLOCK    Location: [facility] / [facility]    Surgeons: Tom Castillo MD            Relevant Problems   Anesthesia   (+) Awareness under anesthesia      GI   (+) Gastroesophageal reflux disease      Neuro/Psych   (+) Anxiety      Musculoskeletal   (+) Osteoarthritis      Other   (+) Arthritis of right glenohumeral joint     Past Surgical History:   Procedure Laterality Date    CARPAL TUNNEL RELEASE Right     CATARACT EXTRACTION      CERVICAL FUSION      CERVICAL FUSION      GASTRIC BYPASS      HYSTERECTOMY      partial    MR HEAD ANGIO WO IV CONTRAST  10/14/2019    MR HEAD ANGIO WO IV CONTRAST [facility]    TRIGGER FINGER RELEASE Right        Clinical information reviewed:   Tobacco  Allergies  Meds   Med Hx  Surg Hx  OB Status  Fam Hx  Soc   Hx        NPO Detail:  No data recorded     Physical Exam    Airway  Mallampati: I  TM distance: >3 FB  Neck ROM: full     Cardiovascular   Comments: deferred   Dental   (+) upper dentures, lower dentures     Pulmonary   Comments: deferred   Abdominal     Comments: deferred           Anesthesia Plan    ASA 2     general and regional   (Right brachial plexus block preop and GA/ETT)  The patient is not a current smoker.  Patient was not previously instructed to abstain from smoking on day of procedure.  Patient did not smoke on day of procedure.    intravenous induction   Postoperative administration of opioids is intended.  Anesthetic plan and risks discussed with patient.    Plan discussed with CRNA.

## 2023-11-03 NOTE — ANESTHESIA POSTPROCEDURE EVALUATION
Patient: Pauline Orta    Procedure Summary       Date: 11/03/23 Room / Location: [facility]    Anesthesia Start: 1038 Anesthesia Stop: 1238    Procedure: RIGHT REVERSE TOTAL SHOULDER REPLACEMENT (Right: Shoulder) Diagnosis: (RIGHT SHOULDER ARTHRITIS)    Surgeons: Tom Castillo MD Responsible Provider: Juan Carlos France MD    Anesthesia Type: general, regional ASA Status: 2            Anesthesia Type: general, regional    Vitals Value Taken Time   /72 11/03/23 1400   Temp 36.2 °C (97.2 °F) 11/03/23 1400   Pulse 79 11/03/23 1400   Resp 16 11/03/23 1400   SpO2 96 % 11/03/23 1400       Anesthesia Post Evaluation    Patient location during evaluation: PACU  Patient participation: complete - patient participated  Level of consciousness: awake and alert  Pain management: satisfactory to patient  Airway patency: patent  Cardiovascular status: hemodynamically stable  Respiratory status: acceptable  Hydration status: acceptable  Comments: PONV present, being treated        No notable events documented.

## 2023-11-03 NOTE — ANESTHESIA PROCEDURE NOTES
Peripheral Block    Patient location during procedure: post-op  Start time: 11/3/2023 10:10 AM  End time: 11/3/2023 10:18 AM  Reason for block: at surgeon's request and post-op pain management  Staffing  Performed: attending   Authorized by: Juan Carlos France MD    Performed by: Juan Carlos France MD  Preanesthetic Checklist  Completed: patient identified, IV checked, site marked, risks and benefits discussed, surgical consent, monitors and equipment checked, pre-op evaluation and timeout performed   Timeout performed at: 11/3/2023 10:08 AM  Peripheral Block  Prep: alcohol swabs  Patient monitoring: heart rate, cardiac monitor and continuous pulse ox  Block type: interscalene and brachial plexus  Laterality: right  Injection technique: single-shot  Guidance: nerve stimulator and ultrasound guided  Infiltration strength: 0.5 %  Dose: 20 mL  Needle  Needle type: short-bevel   Needle gauge: 22 G  Needle length: 5 cm  Needle localization: anatomical landmarks, ultrasound guidance and nerve stimulator  Needle insertion depth: 4 cm  Assessment  Injection assessment: negative aspiration for heme, no paresthesia on injection, incremental injection and local visualized surrounding nerve on ultrasound  Paresthesia pain: none  Heart rate change: no  Slow fractionated injection: yes  Additional Notes  Dexamethasone 10 mg added to local

## 2023-11-03 NOTE — CONSULTS
Inpatient consult to Medicine  Consult performed by: DEIDRE Ramirez-CNP  Consult ordered by: Tom Castillo MD          Reason For Consult  Perioperative management anxiety, risk for hypotension.     History Of Present Illness  Pauline Orta is a 73 y.o. female who presented to [facility] today for scheduled R total shoulder surgery with Dr. Castillo. She has a hx of anxiety/depression and chronic pain/arthritis. Recently with her pre-admin testing she was diagnosed with UTI and states she completed a course of bactrim. She did swab positive for MRSA, she tells me she did not do bactroban nasal ointment. Currently she is resting in bed, comfortable, pain controlled. She tells me she is R hand dominant. She had a recent L ankle sprain, just came out of a boot. She denies any chest pain, SOB, abd pain, diarrhea, urinary symptoms.      Past Medical History  Anxiety/Depression  Osteoarthritis  Cervical disc disease  Kidney stones with stents  Migraine  GERD  Bipolar  Chronic pain    Surgical History  Multiple cystocopy with stents  Cervical spine surgery  Gastric Bypass  Hysterectomy     Social History  Home alone. Denies any smoking or ETOH abuse.     Family History  Family History   Problem Relation Name Age of Onset    Diabetes Mother      Cancer Father      Lung disease Father      Heart disease Father          Allergies  Aspirin, Iodinated contrast media, Lithium, Shellfish derived, Adhesive, and Caffeine    Review of Systems  Please see pertinent positives in the HPI and past medical and surgical histories.      Physical Exam  General: Pleasant, awake, alert.   HEENT: PERRLA, no facial asymmetry noted. Normocephalic, mucous membranes moist.   Neck: No JVD, supple.  Cardiovascular: Regular rate and rhythm. Normal S1 and S2. No murmurs, rubs or gallops.   Respiratory: Clear to auscultation on room air.   Abdomen: Soft, round, non-tender to palpation. Bowel sounds present and  normoactive.  Extremities: No peripheral cyanosis. No edema. RUE in sling.   Neurologic: Alert and oriented to person, place and time. Normal sensation.   Dermatologic: No rash, ecchymosis, or jaundice. R shoulder incision covered with silver dressing, no drainage, no ecchymosis.   Psychological: Appropriate affect and behavior.        Last Recorded Vitals  /76 (BP Location: Left arm, Patient Position: Lying)   Pulse 79   Temp 36.6 °C (97.9 °F) (Temporal)   Resp 16   SpO2 98%     Relevant Results  Pre-admission labs reviewed and unremarkable. Urine culture with e.coli, states she was treated with course of bactrim.   MRSA nasal swab +MRSA.      Assessment/Plan     R Shoulder OA  -POD #0 R total Shoulder.   -Pain control per surgeon.   -Post op care per surgeon.   -PT/OT evals. Anticipate he may need SNF as she is R hand dominant with a recent L ankle sprain.   -Monitor for any post op hypotension, anemia.     MRSA Carrier  -Had + MRSA nasal swab pre-op. She denies receiving bactroban decolonization. Ordered for 4 doses.      Anxiety/Depression  -Continued home medications.     Chronic Pain  -PRN pain meds.     DVT Risk  -Pharm agent per surgeon.   -SCDs.     Plan  Case and plan was discussed with patient, she is agreeable.   Case and plan was also discussed with my collaborating physician.     Thank you for allowing us to participate in the care of this patient.     DEIDRE Ramirez-CNP

## 2023-11-03 NOTE — OP NOTE
RIGHT REVERSE TOTAL SHOULDER REPLACEMENT (R) Operative Note     Date: 11/3/2023  OR Location: [facility]    Name: Pauline Orta, : 1950, Age: 73 y.o., MRN: [id number], Sex: female    Diagnosis  * No Diagnosis Codes entered * * No Diagnosis Codes entered *     Procedures  RIGHT REVERSE TOTAL SHOULDER REPLACEMENT  76381 - CO ARTHROPLASTY GLENOHUMERAL JOINT TOTAL SHOULDER      Surgeons      * Tom Castillo - Primary    Resident/Fellow/Other Assistant:  Surgeon(s) and Role:    Procedure Summary  Anesthesia: General  ASA: II  Anesthesia Staff: Anesthesiologist: Juan Carlos France MD  CRNA: DEIDRE Juarez-SHIRLEY  Estimated Blood Loss: 100 cc mL  Intra-op Medications:   Medication Name Total Dose   vancomycin (Vancocin) vial for injection 1 g   ceFAZolin in dextrose (iso-os) (Ancef) IVPB 2 g 2 g              Anesthesia Record               Intraprocedure I/O Totals          Intake    LR 1000.00 mL    Propofol Drip 0.00 mL    The total shown is the total volume documented since Anesthesia Start was filed.    Autologous Blood 100 mL    Total Intake 1100 mL          Specimen: No specimens collected     Staff:   Circulator: Abdulaziz Tony RN  Scrub Person: Randi Braun; Cr Licona         Drains and/or Catheters: * None in log *    Tourniquet Times:         Implants:  Implants       Type Name Action Serial No.      Joint GLENOID PIN, TARGETER, 2.8MM, STAINLESS - [id number] Implanted       33MM ARTHREX SUTURE CUP, +2 RIGHT Implanted      Joint STEM, UNIVERS REVERS APEX, SZ 9 - [id number] Implanted      Joint BASEPLATE, GLENIOD, MODULAR, 24MM +4 LAT - [id number] Implanted      Joint Shoulder universrevers MODULAR GLENOID SYSTEM CENTRAL SCREW 25 MM Implanted [id number]     Screw SCREW, LOCKING, 5.5MM X 36MM - [id number] Implanted      Joint GLENOSPHERE, 33/24MM BASEPLATE TAPER - [id number] Implanted      Screw SCREW, LOCKING, 5.5MM X 28MM - [id number] Implanted               Findings: Same    Indications:  Pauline Orta is an 73 y.o. female who is having surgery for RIGHT SHOULDER ARTHRITIS.       The patient was seen in the preoperative area. The risks, benefits, complications, treatment options, non-operative alternatives, expected recovery and outcomes were discussed with the patient. The possibilities of reaction to medication, pulmonary aspiration, injury to surrounding structures, bleeding, recurrent infection, the need for additional procedures, failure to diagnose a condition, and creating a complication requiring transfusion or operation were discussed with the patient. The patient concurred with the proposed plan, giving informed consent.  The site of surgery was properly noted/marked if necessary per policy. The patient has been actively warmed in preoperative area. Preoperative antibiotics have been ordered and given within 1 hours of incision. Venous thrombosis prophylaxis are not indicated.    Procedure Details:   Posterior abdomen ministered preoperative block and general anesthesia.  She was placed in the beachchair position.  She was prepped and draped in usual sterile fashion.  Bone MX identified and marked.  Standard deltopectoral incision was made starting the coracoid process extending up to the upper arm.  Subcu tissue dissected with the Bovie.  Flaps were developed.  The cephalic vein was identified and carefully dissected with the deltoid toyed muscle.  The clavipectoral fascia was incised.  The deltoid muscle was mobilized on the humeral head.  A Brown retractor was inserted.  The leading edge of the pectoralis tendon was incised.  The bicipital groove was opened.  The biceps was carefully dissected through the bicipital groove through the rotator interval which was released.  A biceps was then tenodesed to the upper end of the pectoralis tendon with a #2 FiberWire suture.  The remainder of the biceps was tenotomized and removed.  A subscapularis peel was performed.  Stay sutures were  inserted.  The humeral head was delivered into the field.  Inferior capsule release was performed.  Anatomic Cut was performed.  The humeral head was then prepared with a canal finder and then a broach.  Was broached up to a size 9 [place] stem.  Posterior offset was noted.  Endcap was inserted.  Attention was directed towards the glenoid.  A posterior glenoid retractor was inserted.  The subscapularis was released superior medial and inferior of the finger over the axillary nerve.  The labral tissue and bicipital stump was removed.  The inferior capsule was released with my finger over the axillary nerve.  The central portion of glenoid was identified.  Next using the Arthrex preoperative planning as well as the guide the pin was inserted into the glenoid.  It was then measured.  Based on preoperative planning as well as direct measurement of his incision was used and a use a size 25 screw.  Central reaming was performed.  The central drilling was performed.  The hole was tapped/25.  The 24+4 mm modular glenoid system baseplate was assembled.  It was then inserted with excellent purchase.  Inferior and superior locking screws were then inserted.  Attention was then directed towards the humerus.  The end cap was removed.  The posterior offset post was inserted into the humerus.  The upper and the humerus was then drilled.  Attention was directed back towards glenosphere placement.  A 33 glenosphere was then impacted down.  Central screw was inserted.  Attention was directed back to the humerus.  The broach was removed.  2 holes were placed on the upper end of the humerus.  The sutures from the stem of the prosthesis were then placed through the humeral the upper end of the humerus.  The size 9 [place] stem with a 33 mm cup was then impacted down and seem to fit quite well.  Several trials was then performed.  Tissue was used as use a size 6 liner.  This was then inserted and the shoulder was reduced.  There was no  significant impingement with shoulder rotation.  There is no instability with abduction and external and internal rotation.  Attention was directed towards subscapularis repair.  The sutures through the rim of the cup were then passed through the subscapularis using a scorpion equally spaced.  They were then tied to the sutures and the stem of the prosthesis per Arthrex protocol with the arm held approximate 4 degrees external rotation.  The rotator interval was closed with a #2 FiberWire suture.  Extensive irrigation was performed.  Vancomycin powder was inserted.  Deltopectoral closure was performed with 0.0 Vicryl.  The subcu tissues with 3.0 Vicryl.  The skin was closed with 4-0 Prolene subcuticular sutures.  Steri-Strips were applied.  Patient is to sterile silver dressing   Complications:  None; patient tolerated the procedure well.    Disposition: PACU - hemodynamically stable.  Condition: stable         Additional Details: None    Attending Attestation:     Tom Castillo  Phone Number: [phone]

## 2023-11-03 NOTE — ANESTHESIA PROCEDURE NOTES
Airway  Date/Time: 11/3/2023 10:48 AM  Urgency: elective      Staffing  Performed: CRNA   Authorized by: Juan Carlos France MD    Performed by: DEIDRE Juarez-SHIRLEY  Patient location during procedure: OR    Indications and Patient Condition  Indications for airway management: anesthesia  Spontaneous Ventilation: absent  Sedation level: deep  Preoxygenated: yes  Patient position: sniffing  MILS maintained throughout  Mask difficulty assessment: 1 - vent by mask    Final Airway Details  Final airway type: endotracheal airway      Successful airway: ETT  Cuffed: yes   Successful intubation technique: direct laryngoscopy  Facilitating devices/methods: intubating stylet  Blade: Beatris  Blade size: #3  ETT size (mm): 7.0  Cormack-Lehane Classification: grade I - full view of glottis  Placement verified by: chest auscultation   Measured from: lips  ETT to lips (cm): 22  Number of attempts at approach: 1

## 2023-11-04 VITALS
OXYGEN SATURATION: 95 % | DIASTOLIC BLOOD PRESSURE: 56 MMHG | TEMPERATURE: 98.6 F | SYSTOLIC BLOOD PRESSURE: 96 MMHG | HEART RATE: 81 BPM | RESPIRATION RATE: 18 BRPM

## 2023-11-04 PROBLEM — R11.0 NAUSEA: Status: RESOLVED | Noted: 2023-11-03 | Resolved: 2023-11-04

## 2023-11-04 PROBLEM — R11.14 BILIOUS VOMITING WITH NAUSEA: Status: RESOLVED | Noted: 2023-11-03 | Resolved: 2023-11-04

## 2023-11-04 PROBLEM — T88.53XA AWARENESS UNDER ANESTHESIA: Status: RESOLVED | Noted: 2023-11-03 | Resolved: 2023-11-04

## 2023-11-04 LAB
ANION GAP SERPL CALC-SCNC: 9 MMOL/L
BUN SERPL-MCNC: 16 MG/DL (ref 8–25)
CALCIUM SERPL-MCNC: 9 MG/DL (ref 8.5–10.4)
CHLORIDE SERPL-SCNC: 107 MMOL/L (ref 97–107)
CO2 SERPL-SCNC: 24 MMOL/L (ref 24–31)
CREAT SERPL-MCNC: 0.8 MG/DL (ref 0.4–1.6)
GFR SERPL CREATININE-BSD FRML MDRD: 77 ML/MIN/1.73M*2
GLUCOSE SERPL-MCNC: 91 MG/DL (ref 65–99)
POTASSIUM SERPL-SCNC: 4.3 MMOL/L (ref 3.4–5.1)
SODIUM SERPL-SCNC: 140 MMOL/L (ref 133–145)

## 2023-11-04 PROCEDURE — 2500000001 HC RX 250 WO HCPCS SELF ADMINISTERED DRUGS (ALT 637 FOR MEDICARE OP): Performed by: ORTHOPAEDIC SURGERY

## 2023-11-04 PROCEDURE — 2500000001 HC RX 250 WO HCPCS SELF ADMINISTERED DRUGS (ALT 637 FOR MEDICARE OP): Performed by: INTERNAL MEDICINE

## 2023-11-04 PROCEDURE — 36415 COLL VENOUS BLD VENIPUNCTURE: CPT | Performed by: NURSE PRACTITIONER

## 2023-11-04 PROCEDURE — 82374 ASSAY BLOOD CARBON DIOXIDE: CPT | Performed by: NURSE PRACTITIONER

## 2023-11-04 PROCEDURE — 97165 OT EVAL LOW COMPLEX 30 MIN: CPT | Mod: GO

## 2023-11-04 PROCEDURE — 97161 PT EVAL LOW COMPLEX 20 MIN: CPT | Mod: GP

## 2023-11-04 PROCEDURE — 96376 TX/PRO/DX INJ SAME DRUG ADON: CPT

## 2023-11-04 PROCEDURE — G0378 HOSPITAL OBSERVATION PER HR: HCPCS

## 2023-11-04 PROCEDURE — 2500000004 HC RX 250 GENERAL PHARMACY W/ HCPCS (ALT 636 FOR OP/ED): Performed by: ORTHOPAEDIC SURGERY

## 2023-11-04 PROCEDURE — 97110 THERAPEUTIC EXERCISES: CPT | Mod: GO

## 2023-11-04 PROCEDURE — 2500000004 HC RX 250 GENERAL PHARMACY W/ HCPCS (ALT 636 FOR OP/ED): Performed by: NURSE PRACTITIONER

## 2023-11-04 PROCEDURE — 97535 SELF CARE MNGMENT TRAINING: CPT | Mod: GO

## 2023-11-04 PROCEDURE — 80048 BASIC METABOLIC PNL TOTAL CA: CPT | Performed by: NURSE PRACTITIONER

## 2023-11-04 PROCEDURE — 97530 THERAPEUTIC ACTIVITIES: CPT | Mod: GP

## 2023-11-04 PROCEDURE — 2500000001 HC RX 250 WO HCPCS SELF ADMINISTERED DRUGS (ALT 637 FOR MEDICARE OP): Performed by: NURSE PRACTITIONER

## 2023-11-04 RX ORDER — DIPHENHYDRAMINE HCL 25 MG
25 TABLET ORAL EVERY 6 HOURS PRN
Status: DISCONTINUED | OUTPATIENT
Start: 2023-11-04 | End: 2023-11-04 | Stop reason: HOSPADM

## 2023-11-04 RX ORDER — SUMATRIPTAN SUCCINATE 25 MG/1
50 TABLET ORAL 4 TIMES DAILY PRN
Status: DISCONTINUED | OUTPATIENT
Start: 2023-11-04 | End: 2023-11-04 | Stop reason: HOSPADM

## 2023-11-04 RX ADMIN — OXYCODONE AND ACETAMINOPHEN 1 TABLET: 5; 325 TABLET ORAL at 03:50

## 2023-11-04 RX ADMIN — POLYETHYLENE GLYCOL 3350 17 G: 17 POWDER, FOR SOLUTION ORAL at 08:47

## 2023-11-04 RX ADMIN — SERTRALINE 50 MG: 50 TABLET, FILM COATED ORAL at 08:45

## 2023-11-04 RX ADMIN — OXYCODONE AND ACETAMINOPHEN 1 TABLET: 5; 325 TABLET ORAL at 12:16

## 2023-11-04 RX ADMIN — DULOXETINE HYDROCHLORIDE 30 MG: 30 CAPSULE, DELAYED RELEASE ORAL at 09:00

## 2023-11-04 RX ADMIN — Medication 5000 UNITS: at 08:45

## 2023-11-04 RX ADMIN — PREGABALIN 225 MG: 75 CAPSULE ORAL at 08:45

## 2023-11-04 RX ADMIN — DIPHENHYDRAMINE HYDROCHLORIDE 25 MG: 25 TABLET ORAL at 02:09

## 2023-11-04 RX ADMIN — MUPIROCIN: 20 OINTMENT TOPICAL at 09:00

## 2023-11-04 RX ADMIN — CYANOCOBALAMIN TAB 500 MCG 250 MCG: 500 TAB at 08:45

## 2023-11-04 RX ADMIN — TOPIRAMATE 25 MG: 25 TABLET, FILM COATED ORAL at 08:46

## 2023-11-04 RX ADMIN — HYDROMORPHONE HYDROCHLORIDE 0.5 MG: 0.5 INJECTION, SOLUTION INTRAMUSCULAR; INTRAVENOUS; SUBCUTANEOUS at 02:13

## 2023-11-04 RX ADMIN — OXYCODONE AND ACETAMINOPHEN 1 TABLET: 5; 325 TABLET ORAL at 08:47

## 2023-11-04 RX ADMIN — OXYCODONE HYDROCHLORIDE AND ACETAMINOPHEN 500 MG: 500 TABLET ORAL at 08:45

## 2023-11-04 RX ADMIN — ACETAMINOPHEN 650 MG: 325 TABLET ORAL at 05:31

## 2023-11-04 RX ADMIN — SUMATRIPTAN SUCCINATE 50 MG: 25 TABLET ORAL at 02:09

## 2023-11-04 RX ADMIN — HYDROMORPHONE HYDROCHLORIDE 0.5 MG: 0.5 INJECTION, SOLUTION INTRAMUSCULAR; INTRAVENOUS; SUBCUTANEOUS at 06:14

## 2023-11-04 ASSESSMENT — COGNITIVE AND FUNCTIONAL STATUS - GENERAL
EATING MEALS: A LOT
MOVING FROM LYING ON BACK TO SITTING ON SIDE OF FLAT BED WITH BEDRAILS: A LITTLE
DAILY ACTIVITIY SCORE: 16
HELP NEEDED FOR BATHING: A LOT
CLIMB 3 TO 5 STEPS WITH RAILING: A LOT
DRESSING REGULAR LOWER BODY CLOTHING: A LOT
PERSONAL GROOMING: A LITTLE
MOBILITY SCORE: 16
WALKING IN HOSPITAL ROOM: A LITTLE
WALKING IN HOSPITAL ROOM: A LOT
STANDING UP FROM CHAIR USING ARMS: A LITTLE
DRESSING REGULAR LOWER BODY CLOTHING: A LOT
TURNING FROM BACK TO SIDE WHILE IN FLAT BAD: A LOT
TURNING FROM BACK TO SIDE WHILE IN FLAT BAD: A LITTLE
STANDING UP FROM CHAIR USING ARMS: A LITTLE
PERSONAL GROOMING: A LOT
DRESSING REGULAR UPPER BODY CLOTHING: A LOT
STANDING UP FROM CHAIR USING ARMS: A LITTLE
CLIMB 3 TO 5 STEPS WITH RAILING: A LITTLE
TOILETING: A LOT
TOILETING: A LITTLE
MOVING TO AND FROM BED TO CHAIR: A LITTLE
HELP NEEDED FOR BATHING: A LOT
MOVING TO AND FROM BED TO CHAIR: A LITTLE
DAILY ACTIVITIY SCORE: 12
EATING MEALS: A LOT
MOBILITY SCORE: 16
MOVING FROM LYING ON BACK TO SITTING ON SIDE OF FLAT BED WITH BEDRAILS: A LITTLE
CLIMB 3 TO 5 STEPS WITH RAILING: A LOT
TURNING FROM BACK TO SIDE WHILE IN FLAT BAD: A LITTLE
MOBILITY SCORE: 16
DAILY ACTIVITIY SCORE: 12
DRESSING REGULAR UPPER BODY CLOTHING: A LOT
MOVING TO AND FROM BED TO CHAIR: A LITTLE
PERSONAL GROOMING: A LOT
DRESSING REGULAR UPPER BODY CLOTHING: A LOT
EATING MEALS: A LITTLE
MOVING FROM LYING ON BACK TO SITTING ON SIDE OF FLAT BED WITH BEDRAILS: A LOT
WALKING IN HOSPITAL ROOM: A LOT
TOILETING: A LOT
HELP NEEDED FOR BATHING: A LITTLE
DRESSING REGULAR LOWER BODY CLOTHING: A LOT

## 2023-11-04 ASSESSMENT — ACTIVITIES OF DAILY LIVING (ADL)
BATHING_LEVEL_OF_ASSISTANCE: CLOSE SUPERVISION
ADL_ASSISTANCE: INDEPENDENT
HOME_MANAGEMENT_TIME_ENTRY: 24
ADL_ASSISTANCE: INDEPENDENT
BATHING_ASSISTANCE: STAND BY
BATHING_WHERE_ASSESSED: SITTING SINKSIDE
BATHING_COMMENTS: SPONGE BATH

## 2023-11-04 ASSESSMENT — PAIN SCALES - GENERAL
PAINLEVEL_OUTOF10: 8
PAINLEVEL_OUTOF10: 6
PAINLEVEL_OUTOF10: 0 - NO PAIN
PAINLEVEL_OUTOF10: 10 - WORST POSSIBLE PAIN
PAINLEVEL_OUTOF10: 8
PAINLEVEL_OUTOF10: 10 - WORST POSSIBLE PAIN
PAINLEVEL_OUTOF10: 0 - NO PAIN
PAINLEVEL_OUTOF10: 0 - NO PAIN
PAINLEVEL_OUTOF10: 5 - MODERATE PAIN
PAINLEVEL_OUTOF10: 9
PAINLEVEL_OUTOF10: 6

## 2023-11-04 ASSESSMENT — PAIN - FUNCTIONAL ASSESSMENT
PAIN_FUNCTIONAL_ASSESSMENT: 0-10

## 2023-11-04 NOTE — DISCHARGE INSTR - OTHER ORDERS
[facility] (formerly [facility])  [address] [address], [facility] [address] [phone]

## 2023-11-04 NOTE — PROGRESS NOTES
Physical Therapy    Physical Therapy Evaluation & Treatment    Patient Name: Pauline Orta  MRN: [id number]  Today's Date: 11/4/2023   Time Calculation  Start Time: 0801  Stop Time: 0825  Time Calculation (min): 24 min    Assessment/Plan   PT Assessment  PT Assessment Results: Impaired balance, Decreased mobility, Decreased coordination, Impaired vision, Pain, Orthopedic restrictions, Decreased cognition  Rehab Prognosis: Good  Evaluation/Treatment Tolerance: Patient tolerated treatment well, Patient limited by pain  Strengths: Attitude of self, Ability to acquire knowledge, Premorbid level of function, Support of Caregivers  Assessment Comment: Pt is a 73 y.o. female adm for elective Rt reverse TSA. Pt was indep, living alone PTA, recent Lt ankle impairment, using a cane for mobility. Pt amb w/ CGA, mod/high pain level, no LOB but slightly guarded.  End of Session Patient Position: Up in chair  IP OR SWING BED PT PLAN  Inpatient or Swing Bed: Inpatient  PT Plan  Treatment/Interventions: Bed mobility, Transfer training, Gait training, Balance training, Endurance training, Therapeutic exercise, Therapeutic activity, Home exercise program  PT Plan: Skilled PT  PT Frequency: BID  PT Discharge Recommendations: Low intensity level of continued care, Other (comment) (recommend increased assist at home)  Equipment Recommended upon Discharge: Straight cane  PT Recommended Transfer Status: Contact guard  PT - OK to Discharge: Yes    Subjective     General Visit Information:  General  Reason for Referral: s/p R reverse total shoulder  Referred By: Dr. Castillo  Past Medical History Relevant to Rehab: anxiety, GERD, OA, chronic pain, recent L ankle sprain, Lt ankle fx 8/23, cervical fusion, bipolar, early dementia, UTI on PAT, cystoscopies w/ stents  Family/Caregiver Present: No  Patient Position Received: Up in chair  Preferred Learning Style: verbal  General Comment: Pt agreeable to PT eval  Home Living:  Home  Living  Type of Home: Apartment  Lives With: Alone  Home Adaptive Equipment: Walker rolling or standard, Cane  Home Layout: One level  Home Access: Elevator  Bathroom Shower/Tub: Walk-in shower  Bathroom Toilet: Standard  Bathroom Equipment: Grab bars in shower, Shower chair with back  Prior Level of Function:  Prior Function Per Pt/Caregiver Report  Level of [place]: Independent with ADLs and functional transfers, Independent with homemaking with ambulation  Receives Help From: Other (Comment) (on and neighbor available to assist)  ADL Assistance: Independent  Homemaking Assistance: Independent  Ambulatory Assistance: Independent (using cane since ankle sprain)  Hand Dominance: Right  Precautions:  Precautions  Hearing/Visual Limitations: glasses  UE Weight Bearing Status: Right Non-Weight Bearing  Medical Precautions: Fall precautions  Post-Surgical Precautions: Right shoulder precautions (PROM to shoulder. OK for AROM elbow, wrist, hand)  Braces Applied: sling RUE in place  Precautions Comment: sling     Objective   Pain:  Pain Assessment  Pain Assessment: 0-10  Pain Score: 10 - Worst possible pain  Pain Type: Surgical pain  Pain Location: Shoulder  Pain Orientation: Right    Activity Tolerance  Endurance: Endurance does not limit participation in activity  Activity Tolerance Comments: good w/ moderate pain    Sensation  Sensation Comment: denied abn sensation    Coordination  Coordination Comment: slow, guarded amb    Postural Control  Posture Comment: RUE in sling, posture slightly guarded    Dynamic Standing Balance  Dynamic Standing-Comments: Fair  Functional Assessments:  Transfers  Transfer: Yes  Transfer 1  Technique 1: Sit to stand, Stand to sit  Transfer Device 1: Cane  Transfer Level of Assistance 1: Contact guard    Ambulation/Gait Training  Ambulation/Gait Training Performed: Yes  Ambulation/Gait Training 1  Surface 1: Level tile  Device 1: Single point cane  Assistance 1: Contact guard  Quality  of Gait 1: Narrow base of support  Comments/Distance (ft) 1: Pt amb ~ 100', slow pace, slightly guarded, cues to increase base of support w/ cane placement, uses cane Lt w/ occasional inconsistent sequence, no LOB. Return to chair, was set up w/ breakfast tray.  Extremity/Trunk Assessments:  RLE   RLE : Within Functional Limits  LLE   LLE : Within Functional Limits  Treatments:  Ambulation/Gait Training  Ambulation/Gait Training Performed: Yes  Ambulation/Gait Training 1  Surface 1: Level tile  Device 1: Single point cane  Assistance 1: Contact guard  Quality of Gait 1: Narrow base of support  Comments/Distance (ft) 1: Pt amb ~ 100', slow pace, slightly guarded, cues to increase base of support w/ cane placement, uses cane Lt w/ occasional inconsistent sequence, no LOB. Return to chair, was set up w/ breakfast tray.  Transfers  Transfer: Yes  Transfer 1  Technique 1: Sit to stand, Stand to sit  Transfer Device 1: Cane  Transfer Level of Assistance 1: Contact guard  Outcome Measures:  [facility] Basic Mobility  Turning from your back to your side while in a flat bed without using bedrails: A lot  Moving from lying on your back to sitting on the side of a flat bed without using bedrails: A lot  Moving to and from bed to chair (including a wheelchair): A little  Standing up from a chair using your arms (e.g. wheelchair or bedside chair): A little  To walk in hospital room: A little  Climbing 3-5 steps with railing: A little  Basic Mobility - Total Score: 16    Encounter Problems       Encounter Problems (Active)       Balance       LTG - Patient will maintain balance to allow for safe mobility (Progressing)       Start:  11/04/23    Expected End:  11/05/23               Mobility       STG - Patient will ambulate 150' supervised/indep w/ cane, no LOB (Progressing)       Start:  11/04/23    Expected End:  11/05/23               Transfers       STG - Patient to transfer to and from sit to supine supervised/indep (Progressing)        Start:  11/04/23    Expected End:  11/05/23            STG - Patient will transfer sit to and from stand mod indep w/ cane (Progressing)       Start:  11/04/23    Expected End:  11/05/23                   Education Documentation  Precautions, taught by Diane Rodriguez, PT at 11/4/2023  9:40 AM.  Learner: Patient  Readiness: Acceptance  Method: Explanation  Response: Verbalizes Understanding, Demonstrated Understanding    Body Mechanics, taught by Diane Rodriguez, PT at 11/4/2023  9:40 AM.  Learner: Patient  Readiness: Acceptance  Method: Explanation  Response: Verbalizes Understanding, Demonstrated Understanding    Home Exercise Program, taught by Diane Rodriguez, PT at 11/4/2023  9:40 AM.  Learner: Patient  Readiness: Acceptance  Method: Explanation  Response: Verbalizes Understanding, Demonstrated Understanding    Mobility Training, taught by Diane Rodriguez, PT at 11/4/2023  9:40 AM.  Learner: Patient  Readiness: Acceptance  Method: Explanation  Response: Verbalizes Understanding, Demonstrated Understanding    Education Comments  No comments found.

## 2023-11-04 NOTE — CARE PLAN
The patient's goals for the shift include  pain control    The clinical goals for the shift include pain control

## 2023-11-04 NOTE — PROGRESS NOTES
Occupational Therapy    Evaluation/Treatment    Patient Name: Pauline Orta  MRN: [id number]  : 1950  Today's Date: 23  Time Calculation  Start Time: 07  Stop Time: 756  Time Calculation (min): 54 min     Assessment:  OT Assessment: Patient is a 73 year old female admitted status post right reverse total shoulder. Patient is presenting with a decline in strength, balance, activity tolerance, and function, resulting in an increased need for assistance with ADL/IADL tasks.  Prognosis: Good  Barriers to Discharge: None  Evaluation/Treatment Tolerance: Treatment limited secondary to medical complications (Comment)  End of Session Communication: Bedside nurse  End of Session Patient Position: Up in chair  OT Assessment Results: Decreased ADL status, Decreased upper extremity range of motion, Decreased upper extremity strength, Decreased safe judgment during ADL, Decreased endurance, Decreased functional mobility, Decreased IADLs, Non-functional right upper extremity, Decreased fine motor control, Decreased gross motor control  Prognosis: Good  Barriers to Discharge: None  Evaluation/Treatment Tolerance: Treatment limited secondary to medical complications (Comment)  Strengths: Attitude of self  Plan:  Treatment Interventions: ADL retraining, Functional transfer training, UE strengthening/ROM, Endurance training, Patient/family training, Neuromuscular reeducation, Fine motor coordination activities, Compensatory technique education  OT Frequency: 3 times per week  OT Discharge Recommendations: Low intensity level of continued care  OT - OK to Discharge: Yes  Treatment Interventions: ADL retraining, Functional transfer training, UE strengthening/ROM, Endurance training, Patient/family training, Neuromuscular reeducation, Fine motor coordination activities, Compensatory technique education    Subjective   Current Problem:  1. Arthritis of right shoulder region  oxyCODONE-acetaminophen (Percocet) 5-325  mg tablet    PT eval and treat      2. Shoulder arthritis          General:   OT Received On: 11/04/23  General  Reason for Referral: s/p R reverse total shoulder  Referred By: Dr. Castillo  Past Medical History Relevant to Rehab: Patient is a 73 year old female who underwent right reverse total shoulder with Dr. Castillo on 11/3/2023. PMH: anxiety, GERD, OA, chronic pain, recent L ankle sprain  Prior to Session Communication: Bedside nurse  Patient Position Received: Bed, 4 rail up  Preferred Learning Style: verbal  General Comment: Patient in bed upon arrival and agreeable to participate  Precautions:  UE Weight Bearing Status: Right Non-Weight Bearing  Medical Precautions: Fall precautions  Post-Surgical Precautions: Right shoulder precautions (PROM to shoulder. OK for AROM elbow, wrist, hand)  Precautions Comment: patient has a caffiene allergy    Pain:  Pain Assessment  Pain Assessment: 0-10  Pain Score: 10 - Worst possible pain  Pain Type: Surgical pain  Pain Location: Shoulder  Pain Orientation: Right  Pain Interventions: Medication (See MAR), Cold applied    Objective   Cognition:  Overall Cognitive Status: Within Functional Limits (patient reports that she has early onset dementia)     Home Living:  Type of Home: Apartment  Lives With: Alone  Home Adaptive Equipment: Walker rolling or standard, Cane  Home Layout: One level  Home Access: Elevator  Bathroom Shower/Tub: Walk-in shower  Bathroom Toilet: Standard  Bathroom Equipment: Grab bars in shower, Shower chair with back  Prior Function:  Level of Keith: Independent with ADLs and functional transfers, Independent with homemaking with ambulation  Receives Help From:  (son and neighbor available to assist)  ADL Assistance: Independent  Homemaking Assistance: Independent  Ambulatory Assistance: Independent (however, patient has been using cane since ankle sprain)  Hand Dominance: Right  IADL History:  Homemaking Responsibilities: Yes  Current  License: Yes  Mode of Transportation: Car  IADL Comments: patient completes IADLs  ADL:  Eating Assistance: Independent  Eating Deficit: Setup  Grooming Assistance: Stand by  Grooming Deficit: Supervision/safety, Steadying (standing at sink)  Bathing Assistance: Stand by  Bathing Deficit: Supervision/safety  UE Dressing Assistance: Moderate  UE Dressing Deficit: Thread RUE, Thread LUE, Verbal cueing, Setup  LE Dressing Assistance: Minimal  LE Dressing Deficit: Don/doff L sock, Increased time to complete, Supervision/safety  Toileting Assistance with Device: Stand by  Toileting Deficit: Supervison/safety, Increased time to complete  Activities of Daily Living: Feeding  Feeding Comments: patient assisted to order breakfast    Grooming  Grooming Level of Assistance: Close supervision  Grooming Where Assessed: Standing sinkside  Grooming Comments: brush teeth and wash hands    UE Bathing  UE Bathing Level of Assistance: Close supervision  UE Bathing Where Assessed: Sitting sinkside  UE Bathing Comments: sponge bath    LE Bathing  LE Bathing Level of Assistance: Close supervision  LE Bathing Where Assessed: Sitting sinkside  LE Bathing Comments: sponge bath    UE Dressing  UE Dressing Level of Assistance: Moderate assistance  UE Dressing Where Assessed: Chair  UE Dressing Comments: assistance and education to doff/don gown    LE Dressing  LE Dressing: Yes  Pants Level of Assistance: Close supervision  Sock Level of Assistance: Moderate assistance  LE Dressing Where Assessed: Chair, Toilet  LE Dressing Comments: standing at toilet, close supervision to doff/don underwear. seated with assistance to don L sock    Toileting  Toileting Level of Assistance: Close supervision  Where Assessed: Toilet  Toileting Comments: able to complete alejandra hygiene tasks seated    Functional Standing Tolerance:  Time: 5 minutes  Activity: ADL's  Functional Standing Tolerance Comments: standing sinkside with Fair+ balance and close  supervision  Bed Mobility/Transfers: Bed Mobility  Bed Mobility: Yes  Bed Mobility 1  Bed Mobility 1: Supine to sitting  Level of Assistance 1: Close supervision  Bed Mobility Comments 1: head of bed elevated    Transfers  Transfer: Yes  Transfer 1  Transfer From 1: Bed to  Transfer to 1: Stand  Technique 1: Sit to stand  Transfer Device 1:  (hand held assist)  Transfer Level of Assistance 1: Contact guard, Hand held assistance  Trials/Comments 1: x1 trial  Transfers 2  Transfer From 2: Toilet to  Transfer to 2: Stand  Technique 2: Sit to stand  Transfer Level of Assistance 2: Close supervision  Trials/Comments 2: x2 trials  Transfers 3  Transfer From 3: Chair without arms to  Transfer to 3: Stand  Technique 3: Sit to stand  Transfer Device 3:  (at sinkside)  Transfer Level of Assistance 3: Close supervision  Trials/Comments 3: x3 trials  Transfers 4  Transfer From 4: Chair with arms to  Transfer to 4: Stand  Technique 4: Sit to stand  Transfer Level of Assistance 4: Close supervision  Trials/Comments 4: x2 trials     Therapy/Activity: Therapeutic Exercise  Therapeutic Exercise Performed: Yes  Therapeutic Exercise Activity 1: seated in the chair, with sling doffed, patient completes 1x10 AROM elbow/wrist/finger flex/ext. fair form and tolerance noted, with patient limited by pain. education to relax R shoulder to maintain precautions    Therapeutic Activity  Therapeutic Activity Performed: Yes  Therapeutic Activity 1: patient completes functional mobility to and from the bathroom with CGA/hand held assist. patient is educated on precautions, with precautions provided on a handout. patient is educated on how to properly doff/don sling, with patient agreeable to take pictures of sling properly donned for future reference. patient is educated on proper UB dressing techniques with instructions written down for patient to return back to. precautions written down in order for patient to refer to for increased  safety.    Vision:Vision - Basic Assessment  Current Vision: Wears glasses all the time  Sensation:  Sensation Comment: denies numbness/tingling  Strength:  Strength Comments: R UE DNT due to shoulder precautions. L UE at least >/= 3/5 grossly    Extremities: RUE   RUE : Exceptions to WFL and LUE   LUE: Within Functional Limits    Outcome Measures: [facility] Daily Activity  Putting on and taking off regular lower body clothing: A lot  Bathing (including washing, rinsing, drying): A little  Putting on and taking off regular upper body clothing: A lot  Toileting, which includes using toilet, bedpan or urinal: A little  Taking care of personal grooming such as brushing teeth: A little  Eating Meals: A little  Daily Activity - Total Score: 16      Education Documentation  Handouts, taught by Delphine Stephens OT at 11/4/2023  8:41 AM.  Learner: Patient  Readiness: Acceptance  Method: Explanation, Demonstration, Handout  Response: Needs Reinforcement, Verbalizes Understanding    Body Mechanics, taught by Delphine Stephens OT at 11/4/2023  8:41 AM.  Learner: Patient  Readiness: Acceptance  Method: Explanation, Demonstration, Handout  Response: Needs Reinforcement, Verbalizes Understanding    Precautions, taught by Delphine Stephens OT at 11/4/2023  8:41 AM.  Learner: Patient  Readiness: Acceptance  Method: Explanation, Demonstration, Handout  Response: Needs Reinforcement, Verbalizes Understanding    Home Exercise Program, taught by Delphine Stephens OT at 11/4/2023  8:41 AM.  Learner: Patient  Readiness: Acceptance  Method: Explanation, Demonstration, Handout  Response: Needs Reinforcement, Verbalizes Understanding    ADL Training, taught by Delphine Stephens OT at 11/4/2023  8:41 AM.  Learner: Patient  Readiness: Acceptance  Method: Explanation, Demonstration, Handout  Response: Needs Reinforcement, Verbalizes Understanding    Education Comments  No comments found.      Goals:  Encounter Problems       Encounter Problems (Active)        OT Goals       ADLs (Progressing)       Start:  11/04/23    Expected End:  11/18/23       Patient will complete ADL tasks with Mod I, using AE as needed, in order to increase patient's safety and independence with self-care tasks.         R UE AROM (Progressing)       Start:  11/04/23    Expected End:  11/18/23       Patient will demonstrate the ability to complete AROM to right elbow, wrist, and hand in order to increase functional use of R UE while maintaining precautions.         Sling (Progressing)       Start:  11/04/23    Expected End:  11/18/23       Patient will demonstrate the ability to doff/don sling with Mod I in order to increase safety and independence with self-care tasks.         Functional Transfers (Progressing)       Start:  11/04/23    Expected End:  11/18/23       Patient will complete functional transfers with Mod I in order to increase patient's safety and independence with self-care tasks.         Functional Mobility (Progressing)       Start:  11/04/23    Expected End:  11/18/23       Patient will demonstrate the ability to complete item retrieval and functional mobility tasks with Mod I in order to increase safety and independence with daily functional tasks.

## 2023-11-04 NOTE — PROGRESS NOTES
Pauline Orta is a 73 y.o. female on day 0 of admission presenting with Arthritis of right shoulder region.      Subjective   Patient seen and examined. Resting in bed. Complains of some pain it the R shoulder. Denies any SOB. Afebrile.        Objective     Last Recorded Vitals  BP 96/56 (BP Location: Left arm, Patient Position: Lying)   Pulse 81   Temp 37 °C (98.6 °F) (Temporal)   Resp 18   SpO2 95%   Intake/Output last 3 Shifts:    Intake/Output Summary (Last 24 hours) at 11/4/2023 0930  Last data filed at 11/4/2023 0648  Gross per 24 hour   Intake 2434.17 ml   Output 1450 ml   Net 984.17 ml       Admission Weight       Daily Weight  10/23/23 : 68.4 kg (150 lb 12.7 oz)    Image Results  XR shoulder right 2+ views  Narrative: Interpreted By:  Tamar Javed,   STUDY:  XR SHOULDER RIGHT 2+ VIEWS 11/3/2023 2:11 pm      INDICATION:  Signs/Symptoms:Post op total shoulder      COMPARISON:  08/24/2023      ACCESSION NUMBER(S):  [id number]      ORDERING CLINICIAN:  CHRISTEN MOLINA      TECHNIQUE:  two views of the Right shoulder were performed.      FINDINGS:  Interval postsurgical changes of right reversed shoulder  arthroplasty. Soft tissue swelling and gas is seen in the right  shoulder.      Impression: Interval postsurgical changes of reverse right shoulder arthroplasty.      MACRO:  None.      Signed by: Tamar Javed 11/3/2023 2:37 PM  Dictation workstation:   [id number]      Physical Exam    General: Alert and oriented x3, pleasant.   Cardiac: Regular rate and rhythm, S1/S2 , no murmur.   Pulmonary: Clear to auscultation on room air.   Abdomen: Soft, round, nontender. BS +x4.   Extremities: No edema. RUE in sling.   Skin: No rashes or lesions.  Surgical dressing to the R shoulder, CDI.     Relevant Results    Scheduled medications  acetaminophen, 650 mg, oral, q6h JUAN MIGUEL  ascorbic acid, 500 mg, oral, Daily  cholecalciferol, 5,000 Units, oral, Daily  cyanocobalamin, 250 mcg, oral, Daily  DULoxetine,  30 mg, oral, Daily  mupirocin, , Topical, BID  polyethylene glycol, 17 g, oral, Daily  pregabalin, 225 mg, oral, BID  sertraline, 50 mg, oral, Daily  topiramate, 25 mg, oral, Daily      Continuous medications  lactated Ringer's, 50 mL/hr, Last Rate: 50 mL/hr (11/04/23 0648)      PRN medications  PRN medications: diphenhydrAMINE, HYDROmorphone, naloxone, ondansetron ODT **OR** ondansetron, oxyCODONE-acetaminophen, SUMAtriptan     Results for orders placed or performed during the hospital encounter of 11/03/23 (from the past 24 hour(s))   Basic Metabolic Panel   Result Value Ref Range    Glucose 91 65 - 99 mg/dL    Sodium 140 133 - 145 mmol/L    Potassium 4.3 3.4 - 5.1 mmol/L    Chloride 107 97 - 107 mmol/L    Bicarbonate 24 24 - 31 mmol/L    Urea Nitrogen 16 8 - 25 mg/dL    Creatinine 0.80 0.40 - 1.60 mg/dL    eGFR 77 >60 mL/min/1.73m*2    Calcium 9.0 8.5 - 10.4 mg/dL    Anion Gap 9 <=19 mmol/L             Assessment/Plan      R Shoulder OA  -POD #1 R total Shoulder.   -Pain control per surgeon.   -Post op care per surgeon.   -PT/OT evals. Did well this AM, plan for home.   -Monitor for any post op hypotension, anemia. Stable.      MRSA Carrier  -Had + MRSA nasal swab pre-op. She denies receiving bactroban decolonization. Ordered for 4 doses.      Anxiety/Depression  -Continued home medications.      Chronic Pain  -PRN pain meds.      DVT Risk  -Pharm agent per surgeon.   -SCDs.      Plan  Doing well post op.   PT/OT.   Bactroban to nares.   Plan for home at [place].          DEIDRE Ramirez-CNP

## 2023-11-04 NOTE — PROGRESS NOTES
Spiritual Care Visit    Clinical Encounter Type  Visited With: Patient  Routine Visit: Introduction  Continue Visiting: Yes         Values/Beliefs  Spiritual Requests During Hospitalization: Anointing & Communion    Sacramental Encounters  Communion: Patient wants communion  Communion Given Indicator: Yes  Sacrament of Sick-Anointing: Anointed     Caio Guerrero

## 2023-11-04 NOTE — CARE PLAN
Problem: Balance  Goal: LTG - Patient will maintain balance to allow for safe mobility  Outcome: Progressing     Problem: Mobility  Goal: STG - Patient will ambulate 150' supervised/indep w/ cane, no LOB  Outcome: Progressing     Problem: Transfers  Goal: STG - Patient to transfer to and from sit to supine supervised/indep  Outcome: Progressing  Goal: STG - Patient will transfer sit to and from stand mod indep w/ cane  Outcome: Progressing

## 2023-11-04 NOTE — DISCHARGE SUMMARY
Discharge Diagnosis  Arthritis of right shoulder region    Issues Requiring Follow-Up  Status post right shoulder arthroplasty    Test Results Pending At Discharge  Pending Labs       No current pending labs.            Hospital Course   Patient did well postoperatively and was deemed a candidate for discharge home postop day 1.    Pertinent Physical Exam At Time of Discharge  Physical Exam  Dressings clean dry intact right arm sensation intact median radial ulnar distributions  Home Medications     Medication List      START taking these medications     oxyCODONE-acetaminophen 5-325 mg tablet; Commonly known as: Percocet;   Take 1 tablet by mouth every 6 hours if needed for severe pain (7 - 10)     CONTINUE taking these medications     ascorbic acid 500 mg tablet; Commonly known as: Vitamin C   bisacodyl 10 mg suppository; Commonly known as: Dulcolax   calcium citrate-vitamin D2 250 mg-2.5 mcg (100 unit) tablet   cholecalciferol 125 MCG (5000 UT) capsule; Commonly known as: Vitamin   D-3   cyanocobalamin 250 mcg tablet; Commonly known as: Vitamin B-12   cyclobenzaprine 5 mg tablet; Commonly known as: Flexeril   diclofenac sodium 1 % gel gel; Commonly known as: Voltaren   DULoxetine 30 mg DR capsule; Commonly known as: Cymbalta; Take 1 capsule   (30 mg) by mouth once daily. Do not crush or chew.   estradiol 0.01 % (0.1 mg/gram) vaginal cream; Commonly known as: Estrace   Fish OiL 1,000 mg (120 mg-180 mg) capsule; Generic drug: omega   3-dha-epa-fish oil   gabapentin 800 mg tablet; Commonly known as: Neurontin   multivitamin tablet   naratriptan 2.5 mg tablet; Commonly known as: Amerge   ondansetron 4 mg tablet; Commonly known as: Zofran; Take 1 tablet (4 mg)   by mouth every 8 hours if needed for nausea or vomiting for up to 14 days.   polyethylene glycol 17 gram packet; Commonly known as: Glycolax, Miralax   pregabalin 225 mg capsule; Commonly known as: Lyrica; Take 1 capsule   (225 mg) by mouth 2 times a day.    PriLOSEC OTC 20 mg EC tablet; Generic drug: omeprazole OTC   Restasis 0.05 % ophthalmic emulsion; Generic drug: cycloSPORINE   sertraline 50 mg tablet; Commonly known as: Zoloft; Take 1 tablet (50   mg) by mouth once daily.   topiramate 25 mg tablet; Commonly known as: Topamax   TUMERIC-GING-OLIVE-OREG-CAPRYL ORAL       Outpatient Follow-Up  Future Appointments   Date Time Provider Department Center   11/27/2023  2:15 PM Venancio Colin PT [id number] [facility]   12/22/2023  9:30 AM Irvin Hogue MD [id number] [facility]       Noel Green MD

## 2023-11-04 NOTE — PROGRESS NOTES
Pauline Orta is a 73 y.o. female on day 0 of admission presenting with Arthritis of right shoulder region.    TCC spoke to patient, updated regarding therapy recommendations. Patient agreeable to [facility] at this time.     1115: Pt set up with [facility] RN states MD will place home care order, pt updated via phone at this time     1140: [facility] orders received and sent to company     Alexandra Parson RN

## 2023-11-04 NOTE — CARE PLAN
The patient's goals for the shift include  pain control    The clinical goals for the shift include pain mangement    Over the shift, the patient did not make progress toward the following goals. Barriers to progression include none. Recommendations to address these barriers include none.      11/04/23 at 1:59 AM - XIOMARA PATEL RN

## 2023-11-06 ENCOUNTER — TELEPHONE (OUTPATIENT)
Dept: PRIMARY CARE | Facility: CLINIC | Age: 73
End: 2023-11-06

## 2023-11-06 NOTE — TELEPHONE ENCOUNTER
Okay for verbal but it does seem like this should be coming from orthopedics as they were the ones who placed the referral.

## 2023-11-06 NOTE — TELEPHONE ENCOUNTER
[facility] called stating that this patient received referral over the week end for Ortho from his hip replacement, states they need PT /OT for this they needed a verbal for you to follow, I gave them a yes. Please advise

## 2023-11-08 ENCOUNTER — TELEPHONE (OUTPATIENT)
Dept: PAIN MEDICINE | Facility: CLINIC | Age: 73
End: 2023-11-08
Payer: MEDICARE

## 2023-11-08 NOTE — TELEPHONE ENCOUNTER
Patient asking if there is any way she can take both pregabalin and gabapentin. (10/30/23 visit gabapentin was discontinued and patient was started on pregabalin). Patient asking for a call to discuss this.   COVID PCR + 12/13. Recent CXR clear, respiratory status stable on room air; s/p monoclonal antibodies Regeneron, s/p 5 day course of Remdesivir, s/p 10 day course of Dexamethasone  -repeat COVID PCR 12/26, 12/28 positive  -COVID negative 1/3/2022

## 2023-11-15 ENCOUNTER — APPOINTMENT (OUTPATIENT)
Dept: PHYSICAL THERAPY | Facility: CLINIC | Age: 73
End: 2023-11-15
Payer: MEDICARE

## 2023-11-22 DIAGNOSIS — M25.511 CHRONIC RIGHT SHOULDER PAIN: ICD-10-CM

## 2023-11-22 DIAGNOSIS — M79.7 FIBROMYALGIA: ICD-10-CM

## 2023-11-22 DIAGNOSIS — G89.29 CHRONIC RIGHT SHOULDER PAIN: ICD-10-CM

## 2023-11-22 RX ORDER — PREGABALIN 225 MG/1
CAPSULE ORAL
Qty: 180 CAPSULE | Refills: 1 | Status: SHIPPED | OUTPATIENT
Start: 2023-11-22 | End: 2024-03-08 | Stop reason: WASHOUT

## 2023-11-27 ENCOUNTER — APPOINTMENT (OUTPATIENT)
Dept: PHYSICAL THERAPY | Facility: CLINIC | Age: 73
End: 2023-11-27
Payer: MEDICARE

## 2023-11-28 ENCOUNTER — APPOINTMENT (OUTPATIENT)
Dept: PHYSICAL THERAPY | Facility: CLINIC | Age: 73
End: 2023-11-28
Payer: MEDICARE

## 2023-12-08 ENCOUNTER — OFFICE VISIT (OUTPATIENT)
Dept: PRIMARY CARE | Facility: CLINIC | Age: 73
End: 2023-12-08
Payer: MEDICARE

## 2023-12-08 VITALS
DIASTOLIC BLOOD PRESSURE: 68 MMHG | OXYGEN SATURATION: 96 % | SYSTOLIC BLOOD PRESSURE: 116 MMHG | TEMPERATURE: 96.5 F | HEIGHT: 63 IN | HEART RATE: 94 BPM | BODY MASS INDEX: 26.44 KG/M2 | WEIGHT: 149.2 LBS

## 2023-12-08 DIAGNOSIS — Z96.611 STATUS POST TOTAL SHOULDER ARTHROPLASTY, RIGHT: ICD-10-CM

## 2023-12-08 DIAGNOSIS — M19.011 ARTHRITIS OF RIGHT GLENOHUMERAL JOINT: Primary | ICD-10-CM

## 2023-12-08 PROCEDURE — 1125F AMNT PAIN NOTED PAIN PRSNT: CPT | Performed by: FAMILY MEDICINE

## 2023-12-08 PROCEDURE — 1160F RVW MEDS BY RX/DR IN RCRD: CPT | Performed by: FAMILY MEDICINE

## 2023-12-08 PROCEDURE — 99214 OFFICE O/P EST MOD 30 MIN: CPT | Performed by: FAMILY MEDICINE

## 2023-12-08 PROCEDURE — 1159F MED LIST DOCD IN RCRD: CPT | Performed by: FAMILY MEDICINE

## 2023-12-08 PROCEDURE — 1036F TOBACCO NON-USER: CPT | Performed by: FAMILY MEDICINE

## 2023-12-08 RX ORDER — CHLORHEXIDINE GLUCONATE ORAL RINSE 1.2 MG/ML
15 SOLUTION DENTAL AS NEEDED
COMMUNITY
Start: 2023-10-23 | End: 2024-03-08 | Stop reason: WASHOUT

## 2023-12-08 ASSESSMENT — PATIENT HEALTH QUESTIONNAIRE - PHQ9
2. FEELING DOWN, DEPRESSED OR HOPELESS: NOT AT ALL
1. LITTLE INTEREST OR PLEASURE IN DOING THINGS: NOT AT ALL
SUM OF ALL RESPONSES TO PHQ9 QUESTIONS 1 AND 2: 0

## 2023-12-08 ASSESSMENT — ANXIETY QUESTIONNAIRES
4. TROUBLE RELAXING: NOT AT ALL
1. FEELING NERVOUS, ANXIOUS, OR ON EDGE: SEVERAL DAYS
2. NOT BEING ABLE TO STOP OR CONTROL WORRYING: SEVERAL DAYS
6. BECOMING EASILY ANNOYED OR IRRITABLE: NOT AT ALL
GAD7 TOTAL SCORE: 2
5. BEING SO RESTLESS THAT IT IS HARD TO SIT STILL: NOT AT ALL
IF YOU CHECKED OFF ANY PROBLEMS ON THIS QUESTIONNAIRE, HOW DIFFICULT HAVE THESE PROBLEMS MADE IT FOR YOU TO DO YOUR WORK, TAKE CARE OF THINGS AT HOME, OR GET ALONG WITH OTHER PEOPLE: NOT DIFFICULT AT ALL
7. FEELING AFRAID AS IF SOMETHING AWFUL MIGHT HAPPEN: NOT AT ALL
3. WORRYING TOO MUCH ABOUT DIFFERENT THINGS: NOT AT ALL

## 2023-12-08 ASSESSMENT — LIFESTYLE VARIABLES
SKIP TO QUESTIONS 9-10: 1
AUDIT-C TOTAL SCORE: 0
HOW MANY STANDARD DRINKS CONTAINING ALCOHOL DO YOU HAVE ON A TYPICAL DAY: PATIENT DOES NOT DRINK
HOW OFTEN DO YOU HAVE SIX OR MORE DRINKS ON ONE OCCASION: NEVER
HOW OFTEN DO YOU HAVE A DRINK CONTAINING ALCOHOL: NEVER

## 2023-12-08 ASSESSMENT — ENCOUNTER SYMPTOMS
LOSS OF SENSATION IN FEET: 0
OCCASIONAL FEELINGS OF UNSTEADINESS: 0
DEPRESSION: 0

## 2023-12-08 ASSESSMENT — PAIN SCALES - GENERAL: PAINLEVEL: 5

## 2023-12-08 NOTE — ASSESSMENT & PLAN NOTE
- Will plan to continue with organized home physical and Occupational Therapy  -As procedure was performed on your dominant arm and leaves to significantly restricted with completing activities of daily living, will additionally attempt to coordinate a home health aide through [facility] to which I will place a referral  -We did review the information provided from [facility] to which I will reach out in efforts to coordinate

## 2023-12-08 NOTE — PROGRESS NOTES
Outpatient Visit Note    Chief Complaint   Patient presents with    Follow-up     Needs Paperwork filled out for pt to be approved for an home health aide.         HPI:  Pauline Orta is a 73 y.o. female who presents to the office for follow-up.  She was last seen in the office on 9/29/2023 secondary to complaints of left-sided rib pain.    In review, patient had been most recently established at [facility], having last seen her primary care provider. At initial encounter she reported a past medical history significant for prior gastric bypass and osteoarthritis with chronic pain.     In interval, she did undergo right shoulder replacement via orthopedics on 11/3/2023.  She has separately had history of chronic pain issues to which she was given pain management referral.  Did have initial encounter scheduled for 9/18/2023 with Dr. Hogue, though she had to cancel as patient came down with COVID-19.    Postoperatively, she has been receiving home health care with physical and Occupational Therapy.  Today she reports moderate delays in her therapy starting with home teams finally starting treatments. Will be having follow up with orthopedics soon. Continues to struggle with activities of daily living as she has limited use of her dominant arm. Is requesting home health aide to assist to which she has had conversations with her insurance. Has some friend and family support, but this is limited.    Denies any shortness of breath or difficulty breathing. She has a history of gastric bypass which leaves her unable to take oral anti-inflammatory medication.    Current Medications  Current Outpatient Medications   Medication Instructions    ascorbic acid (VITAMIN C) 500 mg, oral, Daily    bisacodyl (DULCOLAX) 10 mg, rectal, Daily    calcium citrate-vitamin D2 250 mg-2.5 mcg (100 unit) tablet 1 tablet, oral, Daily    chlorhexidine (Peridex) 0.12 % solution 15 mL, Mouth/Throat, As needed    cholecalciferol (VITAMIN  D-3) 125 mcg, oral, Daily    cyanocobalamin (VITAMIN B-12) 250 mcg, oral, Daily    cyclobenzaprine (FLEXERIL) 5 mg, oral, 3 times daily PRN    diclofenac sodium (VOLTAREN) 4 g, Topical, 4 times daily PRN    DULoxetine (CYMBALTA) 30 mg, oral, Daily, Do not crush or chew.    estradiol (Estrace) 0.01 % (0.1 mg/gram) vaginal cream INSERT 1 GRAM VAGINALLY 2 TIMES A WEEK<BR>    multivitamin tablet 1 tablet, oral, Daily    naratriptan (AMERGE) 2.5 mg, oral, Once as needed,  ONSET OF HEADACHE. MAY REPEAT DOSE AFTER 4 HOURS IF DOES NOT RESOLVE. DO NOT EXCEED 5MG IN 24 HRS<BR>    omega 3-dha-epa-fish oil (Fish OiL) 1,000 mg (120 mg-180 mg) capsule 1,000 mg, oral, Daily    omeprazole OTC (PRILOSEC OTC) 20 mg, oral, 2 times daily    oxyCODONE-acetaminophen (Percocet) 5-325 mg tablet Take 1 tablet by mouth every 6 hours if needed for severe pain (7 - 10)    polyethylene glycol (GLYCOLAX, MIRALAX) 17 g, oral, Daily    pregabalin (Lyrica) 225 mg capsule TAKE 1 CAPSULE(225 MG) BY MOUTH TWICE DAILY    Restasis 0.05 % ophthalmic emulsion 1 drop, Both Eyes, 2 times daily PRN    sertraline (ZOLOFT) 50 mg, oral, Daily    topiramate (Topamax) 25 mg tablet     TUMERIC-GING-OLIVE-OREG-CAPRYL ORAL 1 mg, oral, Daily        Allergies  Allergies   Allergen Reactions    Aspirin Other     hx gastric bypass    Iodinated Contrast Media Hives     Patient states she gets benadryl prior to any scans using IV contrast    Lithium Unknown     Muscle twitch occurred over 30 years ago    Shellfish Derived Angioedema     35 years ago    Adhesive Other     Redness - Irritation    Caffeine Insomnia        Past Medical History:   Diagnosis Date    Adverse effect of anesthesia     woke during anesthesia    Anxiety     Arthritis     Cervical disc disease     Chronic pain disorder     GERD (gastroesophageal reflux disease)       Past Surgical History:   Procedure Laterality Date    CARPAL TUNNEL RELEASE Right     CATARACT EXTRACTION      CERVICAL FUSION       CERVICAL FUSION      GASTRIC BYPASS      HYSTERECTOMY      partial    MR HEAD ANGIO WO IV CONTRAST  10/14/2019    MR HEAD ANGIO WO IV CONTRAST [facility]    TRIGGER FINGER RELEASE Right      Family History   Problem Relation Name Age of Onset    Diabetes Mother      Cancer Father      Lung disease Father      Heart disease Father       Social History     Tobacco Use    Smoking status: Former     Types: Cigarettes     Quit date: 1993     Years since quittin.9    Smokeless tobacco: Never   Vaping Use    Vaping Use: Never used   Substance Use Topics    Alcohol use: Never    Drug use: Never       ROS  All pertinent positive symptoms are included in the history of present illness.  All other systems have been reviewed and are negative and noncontributory to this patient's current ailments.    VITAL SIGNS  Vitals:    23 1505   BP: 116/68   Pulse: 94   Temp: 35.8 °C (96.5 °F)   SpO2: 96%       PHYSICAL EXAM  GENERAL APPEARANCE: alert and oriented, Pleasant and cooperative, No Acute Distress  HEENT: EOMI, PERRLA, MMM  HEART: RRR, normal S1S2, no murmurs, click or rubs  LUNGS: clear to auscultation bilaterally, no wheezes/rhonchi/rales  EXTREMITIES: no edema, right upper extremity immobilized in sling  SKIN: normal, no rash, unremarkable  NEUROLOGIC EXAM: non-focal exam  MUSCULOSKELETAL: no gross abnormalities  PSYCH: affect is normal, eye contact is good      Assessment/Plan   Problem List Items Addressed This Visit             ICD-10-CM    Arthritis of right glenohumeral joint - Primary M19.011    Relevant Orders    Referral to Home Care    Status post total shoulder arthroplasty, right Z96.611     - Will plan to continue with organized home physical and Occupational Therapy  -As procedure was performed on your dominant arm and leaves to significantly restricted with completing activities of daily living, will additionally attempt to coordinate a home health aide through [facility] to which I will  place a referral  -We did review the information provided from [facility] to which I will reach out in efforts to coordinate         Relevant Orders    Referral to Home Care

## 2023-12-12 ENCOUNTER — HOSPITAL ENCOUNTER (OUTPATIENT)
Dept: RADIOLOGY | Facility: HOSPITAL | Age: 73
Discharge: HOME | End: 2023-12-12
Payer: MEDICARE

## 2023-12-12 ENCOUNTER — TELEPHONE (OUTPATIENT)
Dept: PRIMARY CARE | Facility: CLINIC | Age: 73
End: 2023-12-12
Payer: MEDICARE

## 2023-12-12 ENCOUNTER — EVALUATION (OUTPATIENT)
Dept: PHYSICAL THERAPY | Facility: CLINIC | Age: 73
End: 2023-12-12
Payer: MEDICARE

## 2023-12-12 DIAGNOSIS — Z96.611 S/P REVERSE TOTAL SHOULDER ARTHROPLASTY, RIGHT: Primary | ICD-10-CM

## 2023-12-12 DIAGNOSIS — M25.519 PAIN IN SHOULDER REGION AFTER SHOULDER REPLACEMENT: ICD-10-CM

## 2023-12-12 DIAGNOSIS — M19.011 ARTHRITIS OF RIGHT SHOULDER REGION: ICD-10-CM

## 2023-12-12 DIAGNOSIS — Z96.619 PAIN IN SHOULDER REGION AFTER SHOULDER REPLACEMENT: ICD-10-CM

## 2023-12-12 PROCEDURE — 97162 PT EVAL MOD COMPLEX 30 MIN: CPT | Mod: GP | Performed by: PHYSICAL THERAPIST

## 2023-12-12 PROCEDURE — 73200 CT UPPER EXTREMITY W/O DYE: CPT | Mod: RT

## 2023-12-12 ASSESSMENT — ENCOUNTER SYMPTOMS
OCCASIONAL FEELINGS OF UNSTEADINESS: 0
LOSS OF SENSATION IN FEET: 0
DEPRESSION: 0

## 2023-12-12 NOTE — PROGRESS NOTES
Physical Therapy Evaluation and Treatment      Patient Name: Pauline Orta  MRN: [id number]  Today's Date: 12/12/2023  Time Calculation  Start Time: 1600  Stop Time: 1638  Time Calculation (min): 38 min    Current Problem:   1. S/P reverse total shoulder arthroplasty, right  Follow Up In Physical Therapy      2. Arthritis of right shoulder region  PT eval and treat    Follow Up In Physical Therapy          Subjective    General:  Pt is a 73 y.o. female s/p right reverse total shoulder replacement on 11/03/2023. Prior to surgery she was having pain for 4 years and received several injections during this time. Got so bad to the point where she couldn't move it or use it at all. Is right handed. Reports having OT after surgery at her apartment but that they started late so she feels behind. They came 2x a week. Still wearing sling and has follow-up with MD on Thursday. Had CT scan this morning. Also not driving at this time and would like to return to this. Has also found dressing activities to be difficult but getting better. Sleeping in a easy rest bed to help with symptoms. History of 2 cervical fusions which are several years old - will get soreness here.      Precautions: Post-op protocol  Pain: 3/10    Objective   ROM   Shoulder: Right: Flexion: AROM 86, Abduction: AROM 62, IR: AROM to side, ER: AROM neutral    *Pain at endrange    MMT   Strength not tested due to recent surgery and precautions    Dermatomes/Myotomes/Reflexes  Denies numbness tingling    Palpation   TTP right UT.   TTP right RTC musculature, biceps, and deltoid    No edema. Incision is well healing     Flexibility  Moderate right UT and bicep tightness    Outcome Measures:  SPADI: 103/130      Treatments:  Therapeutic Exercise for HEP:  Pendulums CW/CCW   Pendulums Side/Side  Table ABD slide  Wall flexion slide        Assessment   Pt is a 73 y.o. female s/p right reverse total shoulder replacement. She demonstrates decreased ROM, reduced  strength, flexibility limitations, and abnormal posture. Pt will benefit from skilled PT to address the above deficits for improvement in functional activities.       Moderate complexity due to patient's clinical presentation being evolving with changing characteristics, with comorbidities to include OA, neuropathy, HA, all of which may negatively impact rehab tolerance and progression.     Plan:   Treatment/Interventions: Education/ Instruction, Manual therapy, Neuromuscular re-education, Self care/ home management, Therapeutic activities, Therapeutic exercises  PT Plan: Skilled PT  PT Frequency: 2 times per week  Duration: 9 more visits  Rehab Potential: Good  Plan of Care Agreement: Patient        Goals:   Active       Mobility       Goal 1       Start:  12/12/23    Expected End:  03/11/24       Pt will improve right sh AROM to WNL To improve I/ADLs.          Goal 2       Start:  12/12/23    Expected End:  03/11/24       Pt will improve right sh strength to 5/5 to improve I/ADLs.            Pain       Goal 1       Start:  12/12/23    Expected End:  03/11/24       Pt will perform all housework with 0/10 pain.         Goal 2       Start:  12/12/23    Expected End:  03/11/24       Pt will perform all dressing activities with 0/10 pain.

## 2023-12-12 NOTE — TELEPHONE ENCOUNTER
[facility] called stating they need her most recent off visit to be faxed to  [phone], states that need to know if you will sign off and follow I old them yes. Please advise

## 2023-12-13 ENCOUNTER — APPOINTMENT (OUTPATIENT)
Dept: RADIOLOGY | Facility: HOSPITAL | Age: 73
End: 2023-12-13
Payer: MEDICARE

## 2023-12-18 ENCOUNTER — APPOINTMENT (OUTPATIENT)
Dept: RADIOLOGY | Facility: HOSPITAL | Age: 73
End: 2023-12-18
Payer: MEDICARE

## 2023-12-19 ENCOUNTER — TREATMENT (OUTPATIENT)
Dept: PHYSICAL THERAPY | Facility: CLINIC | Age: 73
End: 2023-12-19
Payer: MEDICARE

## 2023-12-19 DIAGNOSIS — M19.011 ARTHRITIS OF RIGHT SHOULDER REGION: ICD-10-CM

## 2023-12-19 DIAGNOSIS — Z96.611 S/P REVERSE TOTAL SHOULDER ARTHROPLASTY, RIGHT: ICD-10-CM

## 2023-12-19 PROCEDURE — 97110 THERAPEUTIC EXERCISES: CPT | Mod: GP,CQ

## 2023-12-19 PROCEDURE — 97140 MANUAL THERAPY 1/> REGIONS: CPT | Mod: GP,CQ

## 2023-12-19 NOTE — PROGRESS NOTES
Physical Therapy Treatment    Patient Name: Pauline Orta  MRN: [id number]  Today's Date: 12/19/2023  Time Calculation  Start Time: 1425  Stop Time: 1455  Time Calculation (min): 30 min  PT Therapeutic Procedures Time Entry  Manual Therapy Time Entry: 10  Therapeutic Exercise Time Entry: 20  Visit # 2/10    Current Problem   1. Arthritis of right shoulder region  Follow Up In Physical Therapy      2. S/P reverse total shoulder arthroplasty, right  Follow Up In Physical Therapy          Subjective   General    Pt reports that her shoulder has been bothering her.  Precautions:  R RTSR   Pain    6-7/10  Post Treatment Pain Level same    Objective   PROM:   flexion 135  ER 45    Treatments:   UBE x 3  Scap add with OTB x 20  Deltoid isometrics x10  Self assist flexion in supine  Self assist SPO  Table slides for flexion.  R shoulder PROM  Assessment   Assessment:    Pt tolerated session fairly well. Moderate amount of cueing for form.    Plan:    Progress strengthening and ROM per protocol.    OP EDUCATION:       Goals:   Active       Mobility       Goal 1 (Progressing)       Start:  12/12/23    Expected End:  03/11/24       Pt will improve right sh AROM to WNL To improve I/ADLs.          Goal 2 (Progressing)       Start:  12/12/23    Expected End:  03/11/24       Pt will improve right sh strength to 5/5 to improve I/ADLs.            Pain       Goal 1 (Progressing)       Start:  12/12/23    Expected End:  03/11/24       Pt will perform all housework with 0/10 pain.         Goal 2 (Progressing)       Start:  12/12/23    Expected End:  03/11/24       Pt will perform all dressing activities with 0/10 pain.                "I fell."

## 2023-12-22 ENCOUNTER — OFFICE VISIT (OUTPATIENT)
Dept: PAIN MEDICINE | Facility: CLINIC | Age: 73
End: 2023-12-22
Payer: MEDICARE

## 2023-12-22 VITALS
BODY MASS INDEX: 26.4 KG/M2 | HEART RATE: 77 BPM | RESPIRATION RATE: 22 BRPM | SYSTOLIC BLOOD PRESSURE: 113 MMHG | WEIGHT: 149 LBS | DIASTOLIC BLOOD PRESSURE: 67 MMHG | HEIGHT: 63 IN

## 2023-12-22 DIAGNOSIS — M47.812 CERVICAL SPONDYLOSIS WITHOUT MYELOPATHY: Primary | ICD-10-CM

## 2023-12-22 PROCEDURE — 1160F RVW MEDS BY RX/DR IN RCRD: CPT | Performed by: ANESTHESIOLOGY

## 2023-12-22 PROCEDURE — 99214 OFFICE O/P EST MOD 30 MIN: CPT | Performed by: ANESTHESIOLOGY

## 2023-12-22 PROCEDURE — 1125F AMNT PAIN NOTED PAIN PRSNT: CPT | Performed by: ANESTHESIOLOGY

## 2023-12-22 PROCEDURE — 1159F MED LIST DOCD IN RCRD: CPT | Performed by: ANESTHESIOLOGY

## 2023-12-22 PROCEDURE — 1036F TOBACCO NON-USER: CPT | Performed by: ANESTHESIOLOGY

## 2023-12-22 RX ORDER — GABAPENTIN 800 MG/1
800 TABLET ORAL 3 TIMES DAILY
Qty: 90 TABLET | Refills: 3 | Status: SHIPPED | OUTPATIENT
Start: 2023-12-22 | End: 2024-03-08 | Stop reason: SDUPTHER

## 2023-12-22 ASSESSMENT — PATIENT HEALTH QUESTIONNAIRE - PHQ9
1. LITTLE INTEREST OR PLEASURE IN DOING THINGS: NOT AT ALL
2. FEELING DOWN, DEPRESSED OR HOPELESS: NOT AT ALL
SUM OF ALL RESPONSES TO PHQ9 QUESTIONS 1 & 2: 0

## 2023-12-22 ASSESSMENT — ENCOUNTER SYMPTOMS
NECK PAIN: 1
NECK STIFFNESS: 1
ACTIVITY CHANGE: 1

## 2023-12-22 ASSESSMENT — PAIN - FUNCTIONAL ASSESSMENT: PAIN_FUNCTIONAL_ASSESSMENT: 0-10

## 2023-12-22 ASSESSMENT — PAIN DESCRIPTION - DESCRIPTORS: DESCRIPTORS: ACHING

## 2023-12-22 ASSESSMENT — PAIN SCALES - GENERAL
PAINLEVEL: 6
PAINLEVEL_OUTOF10: 6

## 2023-12-22 NOTE — H&P (VIEW-ONLY)
The patient is a 73-year-old female with neck pain.  The pain is mostly on the right side.  She has had cervical spinal pain for many years.  She underwent 2 cervical spine surgeries.  The surgeries were quite successful in addressing her radiating arm pain.  The neck pain persists.  The pain radiates into the back of her head.  The pain is affecting her sleep and her activities.  Her quality of life is unacceptable.    Review of Systems   Constitutional:  Positive for activity change.   Musculoskeletal:  Positive for neck pain and neck stiffness.   All other systems reviewed and are negative.    GENERAL: alert and appropriate, in no distress, well-hydrated, well-nourished and interactive  SKIN: no rash noted, surgical scars well healed  RESPIRATORY: breathing non-labored and no grunting/flaring/retractions  CHEST: equal chest rise with normal respiratory effort  ABDOMEN: soft and non-tender  BACK: back normal in appearance, spine with reduced ROM  EXTREMITIES: strength intact  NEUROLOGIC: gait antalgic, David sign negative, Spurling sign reproduced pain, sensation grossly intact.    Assessment and Plan    -Chronicity--chronic spinal pain    -Diagnostics--we reviewed the results of the plain films of the cervical spine from last year    -Pharmacologic--no change    -Psychologic--no need for psychologic intervention from my standpoint    -Physical--we discussed the importance of physical therapy and exercise.  We discussed avoidance and modification techniques.    -Intervention--the patient is a candidate for diagnostic facet medial nerve branch blocks bilaterally at the C3 and C4 levels.  I explained the risk benefits alternatives and diagnostic nature of the procedure to the patient.  The patient wishes to proceed.    I spent time educating the patient on the condition including the treatment and the prognosis.  I invited the patient to call at anytime with any questions.

## 2023-12-24 DIAGNOSIS — M79.7 FIBROMYALGIA: ICD-10-CM

## 2023-12-24 DIAGNOSIS — G89.29 CHRONIC RIGHT SHOULDER PAIN: ICD-10-CM

## 2023-12-24 DIAGNOSIS — M25.511 CHRONIC RIGHT SHOULDER PAIN: ICD-10-CM

## 2023-12-27 ENCOUNTER — APPOINTMENT (OUTPATIENT)
Dept: PHYSICAL THERAPY | Facility: CLINIC | Age: 73
End: 2023-12-27
Payer: MEDICARE

## 2023-12-27 ENCOUNTER — TELEPHONE (OUTPATIENT)
Dept: PAIN MEDICINE | Facility: CLINIC | Age: 73
End: 2023-12-27
Payer: MEDICARE

## 2023-12-27 RX ORDER — DULOXETIN HYDROCHLORIDE 30 MG/1
30 CAPSULE, DELAYED RELEASE ORAL DAILY
Qty: 30 CAPSULE | Refills: 1 | Status: SHIPPED | OUTPATIENT
Start: 2023-12-27 | End: 2024-03-01

## 2023-12-27 NOTE — TELEPHONE ENCOUNTER
Patient left message stating she was calling to schedule with dr clark for her ablasion. Patient was seen on 12/22/23.

## 2024-01-03 ENCOUNTER — TREATMENT (OUTPATIENT)
Dept: PHYSICAL THERAPY | Facility: CLINIC | Age: 74
End: 2024-01-03
Payer: MEDICARE

## 2024-01-03 DIAGNOSIS — M19.011 ARTHRITIS OF RIGHT SHOULDER REGION: ICD-10-CM

## 2024-01-03 DIAGNOSIS — Z96.611 S/P REVERSE TOTAL SHOULDER ARTHROPLASTY, RIGHT: ICD-10-CM

## 2024-01-03 PROCEDURE — 97140 MANUAL THERAPY 1/> REGIONS: CPT | Mod: GP,CQ

## 2024-01-03 NOTE — PROGRESS NOTES
Physical Therapy Treatment    Patient Name: Pauline Orta  MRN: [id number]  Today's Date: 1/3/2024  Time Calculation  Start Time: 0215  Stop Time: 0255  Time Calculation (min): 40 min  PT Therapeutic Procedures Time Entry  Manual Therapy Time Entry: 15  Visit # 3/10    Current Problem   1. Arthritis of right shoulder region  Follow Up In Physical Therapy      2. S/P reverse total shoulder arthroplasty, right  Follow Up In Physical Therapy          Subjective   General    Pt reports high pain levels that have been pretty constant. Does plan to call Dr. Simon and discuss why her pain levels are so high. Pain radiates down lateral aspect of her R upper arm.  Precautions:   Reverse TSA  Pain   7-8/10   Post Treatment Pain Level less    Objective   Highly sensitive TP along R infraspinatus and supraspinatus.   Able to reproduce sx's when palpating R infraspinatus TP.  Treatments:  No there ex today.    DN Performed by Yu PELAYO performed this date. Pt educated on risks and benefits of dry needling. Pt provided verbal consent. All universal precautions followed.    3-30 mm infraspinatus along with infraspinatus HTrp    No adverse response. All IDN guidelines and safety protocols followed.    MHP to R shoulder before and after DN for 10' each  Assessment   Assessment:    Pt was too inflamed to perform there ex this date.  PT educated patient on dry needling noted high sensitivity along nerve points throughout R scapular, upper trap and cervical points. Will continue with DN assessment throughout care.    Plan:    Assess DN technique. Possibly add UT area for DN.    OP EDUCATION:   Pt advised to use more heat later today and no ice.    Goals:   Active       Mobility       Goal 1 (Progressing)       Start:  12/12/23    Expected End:  03/11/24       Pt will improve right sh AROM to WNL To improve I/ADLs.          Goal 2 (Progressing)       Start:  12/12/23    Expected End:  03/11/24       Pt will improve  right sh strength to 5/5 to improve I/ADLs.            Pain       Goal 1 (Progressing)       Start:  12/12/23    Expected End:  03/11/24       Pt will perform all housework with 0/10 pain.         Goal 2 (Progressing)       Start:  12/12/23    Expected End:  03/11/24       Pt will perform all dressing activities with 0/10 pain.

## 2024-01-10 ENCOUNTER — TREATMENT (OUTPATIENT)
Dept: PHYSICAL THERAPY | Facility: CLINIC | Age: 74
End: 2024-01-10
Payer: MEDICARE

## 2024-01-10 DIAGNOSIS — M19.011 ARTHRITIS OF RIGHT SHOULDER REGION: ICD-10-CM

## 2024-01-10 DIAGNOSIS — Z96.611 S/P REVERSE TOTAL SHOULDER ARTHROPLASTY, RIGHT: ICD-10-CM

## 2024-01-10 PROCEDURE — 97140 MANUAL THERAPY 1/> REGIONS: CPT | Mod: GP,CQ

## 2024-01-10 PROCEDURE — 97110 THERAPEUTIC EXERCISES: CPT | Mod: GP,CQ

## 2024-01-10 NOTE — PROGRESS NOTES
Physical Therapy Treatment    Patient Name: Pauline Orta  MRN: [id number]  Today's Date: 1/12/2024  Time Calculation  Start Time: 1400  Stop Time: 1445  Time Calculation (min): 45 min  PT Therapeutic Procedures Time Entry  Manual Therapy Time Entry: 15  Therapeutic Exercise Time Entry: 30  Visit # 4/10    Current Problem   1. Arthritis of right shoulder region  Follow Up In Physical Therapy      2. S/P reverse total shoulder arthroplasty, right  Follow Up In Physical Therapy          Subjective   General    Pt feels the DN helped last session.  Precautions:  RTSA  Pain    6  Post Treatment Pain Level less    Objective   Less tenderness noted along infraspinatus.    Treatments:   UBE x 3'  Incline slide 2 x 15  L3  Scap add x 20 OTB  GH Extension with OTB  x20  S/L ABD 2 x 15 with light assistance of PTA  S/ L ER 2 x 10  SPO 2 x 10    IDN performed this date. Pt educated on risks and benefits of dry needling. Pt provided verbal consent. All universal precautions followed.    1 - 15 mm anterior shoulder  2 - 30 mm infrastpinatus  1 - 30 mm mid delt attachment (mid humerus)    No adverse response. All IDN guidelines and safety protocols followed.  Performed by Yu Appiah PT DPT    Assessment   Assessment:    Slowly progressing with ROM and strengthening.    Responded well to dry needling last session, progressed this date, instructed to utilize heat for pain control and continuation of blood flow promotion at home. Yu Appiah PT DPT    Plan:    Continue with POC    OP EDUCATION:       Goals:   Active       Mobility       Goal 1 (Progressing)       Start:  12/12/23    Expected End:  03/11/24       Pt will improve right sh AROM to WNL To improve I/ADLs.          Goal 2 (Progressing)       Start:  12/12/23    Expected End:  03/11/24       Pt will improve right sh strength to 5/5 to improve I/ADLs.            Pain       Goal 1 (Progressing)       Start:  12/12/23    Expected End:  03/11/24       Pt will  perform all housework with 0/10 pain.         Goal 2 (Progressing)       Start:  12/12/23    Expected End:  03/11/24       Pt will perform all dressing activities with 0/10 pain.

## 2024-01-16 ENCOUNTER — HOSPITAL ENCOUNTER (OUTPATIENT)
Dept: OPERATING ROOM | Facility: HOSPITAL | Age: 74
Discharge: HOME | End: 2024-01-16
Payer: MEDICARE

## 2024-01-16 ENCOUNTER — HOSPITAL ENCOUNTER (OUTPATIENT)
Dept: RADIOLOGY | Facility: HOSPITAL | Age: 74
Discharge: HOME | End: 2024-01-16
Payer: MEDICARE

## 2024-01-16 VITALS
OXYGEN SATURATION: 93 % | TEMPERATURE: 97 F | DIASTOLIC BLOOD PRESSURE: 68 MMHG | RESPIRATION RATE: 16 BRPM | BODY MASS INDEX: 26.4 KG/M2 | SYSTOLIC BLOOD PRESSURE: 111 MMHG | HEIGHT: 63 IN | WEIGHT: 149 LBS | HEART RATE: 73 BPM

## 2024-01-16 DIAGNOSIS — M47.812 CERVICAL SPONDYLOSIS WITHOUT MYELOPATHY: ICD-10-CM

## 2024-01-16 PROCEDURE — 76000 FLUOROSCOPY <1 HR PHYS/QHP: CPT

## 2024-01-16 PROCEDURE — 64490 INJ PARAVERT F JNT C/T 1 LEV: CPT | Mod: 50 | Performed by: ANESTHESIOLOGY

## 2024-01-16 PROCEDURE — 64491 INJ PARAVERT F JNT C/T 2 LEV: CPT | Performed by: ANESTHESIOLOGY

## 2024-01-16 PROCEDURE — 2500000001 HC RX 250 WO HCPCS SELF ADMINISTERED DRUGS (ALT 637 FOR MEDICARE OP): Performed by: ANESTHESIOLOGY

## 2024-01-16 PROCEDURE — 64490 INJ PARAVERT F JNT C/T 1 LEV: CPT | Performed by: ANESTHESIOLOGY

## 2024-01-16 PROCEDURE — 64491 INJ PARAVERT F JNT C/T 2 LEV: CPT

## 2024-01-16 RX ORDER — ALPRAZOLAM 0.5 MG/1
1 TABLET ORAL ONCE
Status: COMPLETED | OUTPATIENT
Start: 2024-01-16 | End: 2024-01-16

## 2024-01-16 RX ADMIN — ALPRAZOLAM 1 MG: 0.5 TABLET ORAL at 09:53

## 2024-01-16 ASSESSMENT — PAIN SCALES - GENERAL
PAINLEVEL_OUTOF10: 7
PAINLEVEL_OUTOF10: 7
PAINLEVEL_OUTOF10: 5 - MODERATE PAIN

## 2024-01-16 ASSESSMENT — PAIN DESCRIPTION - DESCRIPTORS
DESCRIPTORS: SORE
DESCRIPTORS: TIGHTNESS
DESCRIPTORS: TIGHTNESS

## 2024-01-16 ASSESSMENT — PAIN - FUNCTIONAL ASSESSMENT
PAIN_FUNCTIONAL_ASSESSMENT: 0-10

## 2024-01-16 ASSESSMENT — COLUMBIA-SUICIDE SEVERITY RATING SCALE - C-SSRS
2. HAVE YOU ACTUALLY HAD ANY THOUGHTS OF KILLING YOURSELF?: NO
1. IN THE PAST MONTH, HAVE YOU WISHED YOU WERE DEAD OR WISHED YOU COULD GO TO SLEEP AND NOT WAKE UP?: NO
6. HAVE YOU EVER DONE ANYTHING, STARTED TO DO ANYTHING, OR PREPARED TO DO ANYTHING TO END YOUR LIFE?: NO

## 2024-01-16 NOTE — DISCHARGE INSTRUCTIONS
You underwent a procedure today    After most procedures, it is recommended that you relax and limit your activity for the remainder of the day unless you have been told otherwise by your pain physician.  You should not drive a car, operate machinery, or make important legal decisions unless otherwise directed by your pain physician.  You may resume your normal activity, including exercise, tomorrow.      Keep a written pain diary of how much pain relief you experienced following the procedure and the length of time of pain relief you experienced pain relief. Following diagnostic injections like medial branch nerve blocks, sacroiliac joint blocks, stellate ganglion injections and other blocks, it is very important you record the specific amount of pain relief you experienced immediately after the injectionand how long it lasted. Your doctor will ask you for this information at your follow up visit.     For all injections, please keep the injection site dry and inspect the site for a couple of days. You may remove the Band-Aid the day of the injection at any time.     Some discomfort, bruising or slight swelling may occur at the injection site. This is not abnormal if it occurs.  If needed you may:    -Take over the counter medication such as Tylenol or Motrin.   -Apply an ice pack for 30 minutes, 2 to 3 times a day for the first 24 hours.     You may shower today; no soaking baths, hot tubs, whirlpools or swimming pools for two days.      If you are given steroids in your injection, it may take 3-5 days for the steroid medication to take effect. You may notice a worsening of your symptoms for 1-2 days after the injection. This is not abnormal.  You may use acetaminophen, ibuprofen, or prescription medication that your doctor may have prescribed for you if you need to do so.     A few common side effects of steroids include facial flushing, sweating, restlessness, irritability,difficulty sleeping, increase in blood  sugar, and increased blood pressure. If you have diabetes, please monitor your blood sugar at least once a day for at least 5 days. If you have poorly controlled high blood pressure, monitoryour blood pressure for at least 2 days and contact your primary care physician if these numbers are unusually high for you.      If you take aspirin or non-steroidal anti-inflammatory drugs (examples are Motrin, Advil, ibuprofen, Naprosyn, Voltaren, Relafen, etc.) you may restart these this evening.    You do not need to discontinue non-aspirin-containing pain medications prior to an injection (examples: Celebrex, tramadol, hydrocodone and acetaminophen).      If you take a blood thinning medication (Coumadin, Lovenox, Fragmin,Ticlid, Plavix, Pradaxa, etc.), please discuss this with your primary care physician/cardiologist and your pain physician. These medications MUST be discontinued before you can have an injection safely, without the risk of uncontrolled bleeding. If these medications are not discontinued for an appropriate period of time, you will not be able to receivean injection.      If you are taking Coumadin, please have your INR checked the morning of your procedure and bringthe result to your appointment unless otherwise instructed. If your INR is over 1.2, your injection may need to be rescheduled to avoid uncontrolled bleeding from the needle placement.     Call [facility] at [phone] between 8am-4pm Monday - Friday if you are experiencing the following:    If you received an epidural or spinal injection:    -Headache that doesnot go away with medicine, is worse when sitting or standing up, and is greatly relieved upon lying down.   -Severe pain worse than or different than your baseline pain.   -Chills or fever (101º F or greater).   -Drainage or signs of infection at the injection site     Go directly to the Emergency Department if you are experiencing the following and received an  epidural or spinal injection:   -Abrupt weakness or progressive weakness in your legs that starts after you leave the clinic.   -Abrupt severe or worsening numbness in your legs.   -Inability to urinate after the injection or loss of bowel or bladder control without the urge to defecate or urinate.     If you have a clinical question that cannot wait until your next appointment, please call [phone] between 8am-4pm Monday - Friday or send a [employer] message. We do our best to return all non-emergency messages within 24 hours, Monday - Friday. A nurse or physician will return your message.      If you need to cancel an appointment, please call the scheduling staff at [phone] during normal business hours or leave a message at least 24 hours in advance.     If you are going to be sedated for your next procedure, you MUST have responsible adult who can legally drive accompany you home. You cannot eat or drink for eight hours prior to the planned procedure if you are going to receive sedation. You may take your non-blood thinning medications with a small sip of water.

## 2024-01-16 NOTE — OP NOTE
* No procedures listed * Operative Note     Date: 2024  OR Location: [facility] GI Lab Endosc1 OR    Name: Pauline Orta, : 1950, Age: 73 y.o., MRN: [id number], Sex: female    Diagnosis  * No Diagnosis Codes entered * * No Diagnosis Codes entered *     Procedures  * No procedures documented on diagnosis form *    Surgeons   * No surgeons found in log *    Resident/Fellow/Other Assistant:  * No surgeons found in log *    Procedure Summary  Anesthesia: * No anesthesia type entered *  ASA: ASA status not filed in the log.  Anesthesia Staff: No anesthesia staff entered.  Estimated Blood Loss: 0mL  Intra-op Medications: * Intraprocedure medication information is unavailable because the case start and end events have not been set *      Intraprocedure I/O Totals       None           Specimen: No specimens collected     Staff:   No surgical staff documented.         Drains and/or Catheters: * None in log *    Tourniquet Times:         Implants:     Findings:     Indications: Pauline Orta is an 73 y.o. female who is having surgery for * No pre-op diagnosis entered *.     The patient was seen in the preoperative area. The risks, benefits, complications, treatment options, non-operative alternatives, expected recovery and outcomes were discussed with the patient. The possibilities of reaction to medication, pulmonary aspiration, injury to surrounding structures, bleeding, recurrent infection, the need for additional procedures, failure to diagnose a condition, and creating a complication requiring transfusion or operation were discussed with the patient. The patient concurred with the proposed plan, giving informed consent.  The site of surgery was properly noted/marked if necessary per policy. The patient has been actively warmed in preoperative area. Preoperative antibiotics are not indicated. Venous thrombosis prophylaxis are not indicated.    Procedure Details: The patient was brought to the procedure  room and placed in the prone position.  Sterile prep and drape with ChloraPrep and sterile towels.  12 mL of half percent lidocaine were injected through a 25-gauge needle for local anesthesia.  22-gauge spinal needles were guided to the facet medial nerve branches of C3 and C4 levels bilaterally.  After negative aspiration, 2 mL of contrast were injected through the needles to ensure proper needle placement.  Then,  2 mL of 0.25% percent bupivacaine were injected through the needles in divided doses.  The needles were removed and the patient was transferred to recovery.   Complications:  None; patient tolerated the procedure well.    Disposition: PACU - hemodynamically stable.  Condition: stable         Additional Details:     Attending Attestation: I performed the procedure.    *No primary surgeon found*

## 2024-01-16 NOTE — POST-PROCEDURE NOTE
Discharge instructions provided to and reviewed with patient. She has verbalized understanding. All questions answered.  VSS. Pain is still at 7/10. Denies N/V. Ginger-celso provided and tolerating well. Dressings clean dry and intact.

## 2024-01-17 ENCOUNTER — TREATMENT (OUTPATIENT)
Dept: PHYSICAL THERAPY | Facility: CLINIC | Age: 74
End: 2024-01-17
Payer: MEDICARE

## 2024-01-17 DIAGNOSIS — Z96.611 STATUS POST REVERSE TOTAL SHOULDER REPLACEMENT, RIGHT: Primary | ICD-10-CM

## 2024-01-17 DIAGNOSIS — M19.011 ARTHRITIS OF RIGHT SHOULDER REGION: ICD-10-CM

## 2024-01-17 DIAGNOSIS — Z96.611 S/P REVERSE TOTAL SHOULDER ARTHROPLASTY, RIGHT: ICD-10-CM

## 2024-01-17 PROCEDURE — 97140 MANUAL THERAPY 1/> REGIONS: CPT | Mod: GP,CQ

## 2024-01-17 PROCEDURE — 97110 THERAPEUTIC EXERCISES: CPT | Mod: GP,CQ

## 2024-01-17 NOTE — PROGRESS NOTES
Physical Therapy Treatment    Patient Name: Pauline Orta  MRN: [id number]  Today's Date: 1/17/2024  Time Calculation  Start Time: 0145  Stop Time: 0225  Time Calculation (min): 40 min  PT Therapeutic Procedures Time Entry  Manual Therapy Time Entry: 10  Therapeutic Exercise Time Entry: 30  Visit # 5/10  R RTSR 11/03/23  Current Problem   1. Arthritis of right shoulder region  Follow Up In Physical Therapy      2. S/P reverse total shoulder arthroplasty, right  Follow Up In Physical Therapy          Subjective   General    Pt  had an injection for her L sided neck pain which actually made the pain worse. R shoulder still bother her with movement.  Precautions:     Pain    6/10  Post Treatment Pain Level   0/10  Objective   No pain along anterior shoulder. Does have sx's down lateral aspect of R arm.    Treatments:   UBE x 3'  Incline slide 2 x 15  L3  Scap add x 20 GTB  GH Extension with OTB  x 20  Supine flexion 2 x 10  S/L ABD 2 x 15 with light assistance of PTA  SPO 2 x 10  isometric ER x 10     IDN performed this date. Pt educated on risks and benefits of dry needling. Pt provided verbal consent. All universal precautions followed.   3 - 30 mm R bicep   No adverse response. All IDN guidelines followed Yu Appiah PT DPT      Assessment   Assessment:    Pt shows improvement with supine shoulder flexion with ability to initiate and execute movement compared not being able to last tx. Pt unable to perform L sidelying ER of R shoulder without pain.    Plan:    Progress strengthening as tolerated per protocol.       Goals:   Active       Mobility       Goal 1 (Progressing)       Start:  12/12/23    Expected End:  03/11/24       Pt will improve right sh AROM to WNL To improve I/ADLs.          Goal 2 (Progressing)       Start:  12/12/23    Expected End:  03/11/24       Pt will improve right sh strength to 5/5 to improve I/ADLs.            Pain       Goal 1 (Progressing)       Start:  12/12/23    Expected End:   03/11/24       Pt will perform all housework with 0/10 pain.         Goal 2 (Progressing)       Start:  12/12/23    Expected End:  03/11/24       Pt will perform all dressing activities with 0/10 pain.

## 2024-01-19 ENCOUNTER — DOCUMENTATION (OUTPATIENT)
Dept: OCCUPATIONAL THERAPY | Facility: CLINIC | Age: 74
End: 2024-01-19
Payer: COMMERCIAL

## 2024-01-19 DIAGNOSIS — G89.29 OTHER CHRONIC PAIN: Primary | ICD-10-CM

## 2024-01-19 RX ORDER — TIZANIDINE 4 MG/1
4 TABLET ORAL EVERY 12 HOURS
COMMUNITY
Start: 2024-01-18 | End: 2024-01-19 | Stop reason: SDUPTHER

## 2024-01-19 NOTE — TELEPHONE ENCOUNTER
Patient called requesting a refill of Zanaflex 4mg. She stated you were taking over the prescription. Next appt 1/26/24 with Torres

## 2024-01-19 NOTE — PROGRESS NOTES
Occupational Therapy Discharge      Patient Name: Pauline Orta  MRN: [id number]  Today's Date: 1/19/2024    Referred by: Dr. Sommers  Referred for: R CTS and trigger finger release        Rehab Discharge Reason: Achieved all and/or the most significant goals(s)

## 2024-01-22 RX ORDER — TIZANIDINE 4 MG/1
4 TABLET ORAL EVERY 8 HOURS PRN
Qty: 90 TABLET | Refills: 2 | Status: SHIPPED | OUTPATIENT
Start: 2024-01-22 | End: 2024-03-08 | Stop reason: SDUPTHER

## 2024-01-23 ENCOUNTER — TREATMENT (OUTPATIENT)
Dept: PHYSICAL THERAPY | Facility: CLINIC | Age: 74
End: 2024-01-23
Payer: MEDICARE

## 2024-01-23 DIAGNOSIS — Z96.611 S/P REVERSE TOTAL SHOULDER ARTHROPLASTY, RIGHT: ICD-10-CM

## 2024-01-23 DIAGNOSIS — M19.011 ARTHRITIS OF RIGHT SHOULDER REGION: ICD-10-CM

## 2024-01-23 PROCEDURE — 97140 MANUAL THERAPY 1/> REGIONS: CPT | Mod: GP,CQ

## 2024-01-23 PROCEDURE — 97110 THERAPEUTIC EXERCISES: CPT | Mod: GP,CQ

## 2024-01-23 NOTE — PROGRESS NOTES
Physical Therapy Treatment    Patient Name: Pauline Orta  MRN: [id number]  Today's Date: 1/23/2024  Time Calculation  Start Time: 0125  Stop Time: 0210  Time Calculation (min): 45 min  PT Therapeutic Procedures Time Entry  Manual Therapy Time Entry: 15  Therapeutic Exercise Time Entry: 25  Visit #6 /10    Current Problem   1. Arthritis of right shoulder region  Follow Up In Physical Therapy      2. S/P reverse total shoulder arthroplasty, right  Follow Up In Physical Therapy          Subjective   General    Pt reports no pain in her shoulder, just some soreness in the R lateral arm  Precautions:     Pain    5/10 (soreness)   Post Treatment Pain Level     Objective   PROM ER; 50 Flex; 125     Treatments:    UBE x 3'  Incline slide 2 x 15  L3  Scap add x 20 GTB  GH Extension with GTB  x 20  Single arm add with GTB x20  Supine flexion 2 x 10  S/L ABD 2 x 15 with light assistance of PTA  SPO 2 x 10  isometric IR x10       Soft tissue mobilization to pt's R pec minor with passive flexion   IDN performed this date. Pt educated on risks and benefits of dry needling. Pt provided verbal consent. All universal precautions followed.    3 - 30 mm posterior delt  2 - 30 mm mid delt     No adverse response. All IDN guidelines and safety protocols followed.  Performed by Yu Appiah PT DPT      Assessment   Assessment:    Pt tolerated session fairly well. Improved Prom and AROM.    Plan:    Progress strengthening ast tolerated    OP EDUCATION:       Goals:   Active       Mobility       Goal 1 (Progressing)       Start:  12/12/23    Expected End:  03/11/24       Pt will improve right sh AROM to WNL To improve I/ADLs.          Goal 2 (Progressing)       Start:  12/12/23    Expected End:  03/11/24       Pt will improve right sh strength to 5/5 to improve I/ADLs.            Pain       Goal 1 (Progressing)       Start:  12/12/23    Expected End:  03/11/24       Pt will perform all housework with 0/10 pain.         Goal 2  (Progressing)       Start:  12/12/23    Expected End:  03/11/24       Pt will perform all dressing activities with 0/10 pain.

## 2024-01-26 ENCOUNTER — LAB (OUTPATIENT)
Dept: LAB | Facility: LAB | Age: 74
End: 2024-01-26
Payer: MEDICARE

## 2024-01-26 ENCOUNTER — OFFICE VISIT (OUTPATIENT)
Dept: PAIN MEDICINE | Facility: CLINIC | Age: 74
End: 2024-01-26
Payer: MEDICARE

## 2024-01-26 VITALS
BODY MASS INDEX: 26.4 KG/M2 | SYSTOLIC BLOOD PRESSURE: 118 MMHG | HEIGHT: 63 IN | HEART RATE: 91 BPM | DIASTOLIC BLOOD PRESSURE: 76 MMHG | WEIGHT: 149 LBS

## 2024-01-26 DIAGNOSIS — M25.511 PAIN IN RIGHT SHOULDER: Primary | ICD-10-CM

## 2024-01-26 DIAGNOSIS — M48.02 FORAMINAL STENOSIS OF CERVICAL REGION: ICD-10-CM

## 2024-01-26 DIAGNOSIS — M47.812 CERVICAL SPONDYLOSIS: Primary | ICD-10-CM

## 2024-01-26 LAB
BASOPHILS # BLD AUTO: 0.04 X10*3/UL (ref 0–0.1)
BASOPHILS NFR BLD AUTO: 0.4 %
EOSINOPHIL # BLD AUTO: 0.09 X10*3/UL (ref 0–0.4)
EOSINOPHIL NFR BLD AUTO: 0.9 %
ERYTHROCYTE [DISTWIDTH] IN BLOOD BY AUTOMATED COUNT: 15 % (ref 11.5–14.5)
ERYTHROCYTE [SEDIMENTATION RATE] IN BLOOD BY WESTERGREN METHOD: 19 MM/H (ref 0–30)
HCT VFR BLD AUTO: 39.8 % (ref 36–46)
HGB BLD-MCNC: 12.6 G/DL (ref 12–16)
IMM GRANULOCYTES # BLD AUTO: 0.03 X10*3/UL (ref 0–0.5)
IMM GRANULOCYTES NFR BLD AUTO: 0.3 % (ref 0–0.9)
LYMPHOCYTES # BLD AUTO: 3.79 X10*3/UL (ref 0.8–3)
LYMPHOCYTES NFR BLD AUTO: 39.3 %
MCH RBC QN AUTO: 29.9 PG (ref 26–34)
MCHC RBC AUTO-ENTMCNC: 31.7 G/DL (ref 32–36)
MCV RBC AUTO: 95 FL (ref 80–100)
MONOCYTES # BLD AUTO: 0.64 X10*3/UL (ref 0.05–0.8)
MONOCYTES NFR BLD AUTO: 6.6 %
NEUTROPHILS # BLD AUTO: 5.05 X10*3/UL (ref 1.6–5.5)
NEUTROPHILS NFR BLD AUTO: 52.5 %
NRBC BLD-RTO: 0 /100 WBCS (ref 0–0)
PLATELET # BLD AUTO: 284 X10*3/UL (ref 150–450)
RBC # BLD AUTO: 4.21 X10*6/UL (ref 4–5.2)
WBC # BLD AUTO: 9.6 X10*3/UL (ref 4.4–11.3)

## 2024-01-26 PROCEDURE — 85025 COMPLETE CBC W/AUTO DIFF WBC: CPT

## 2024-01-26 PROCEDURE — 1125F AMNT PAIN NOTED PAIN PRSNT: CPT | Performed by: PHYSICIAN ASSISTANT

## 2024-01-26 PROCEDURE — 99214 OFFICE O/P EST MOD 30 MIN: CPT | Mod: 27 | Performed by: PHYSICIAN ASSISTANT

## 2024-01-26 PROCEDURE — 1159F MED LIST DOCD IN RCRD: CPT | Performed by: PHYSICIAN ASSISTANT

## 2024-01-26 PROCEDURE — 86038 ANTINUCLEAR ANTIBODIES: CPT

## 2024-01-26 PROCEDURE — 1160F RVW MEDS BY RX/DR IN RCRD: CPT | Performed by: PHYSICIAN ASSISTANT

## 2024-01-26 PROCEDURE — 36415 COLL VENOUS BLD VENIPUNCTURE: CPT

## 2024-01-26 PROCEDURE — 1036F TOBACCO NON-USER: CPT | Performed by: PHYSICIAN ASSISTANT

## 2024-01-26 PROCEDURE — 99214 OFFICE O/P EST MOD 30 MIN: CPT | Performed by: PHYSICIAN ASSISTANT

## 2024-01-26 PROCEDURE — 85652 RBC SED RATE AUTOMATED: CPT

## 2024-01-26 ASSESSMENT — ENCOUNTER SYMPTOMS
PALPITATIONS: 0
UNEXPECTED WEIGHT CHANGE: 0
ACTIVITY CHANGE: 0
VOMITING: 0
FATIGUE: 0
SLEEP DISTURBANCE: 0
SHORTNESS OF BREATH: 0
SORE THROAT: 0
COUGH: 0
DIARRHEA: 0
FEVER: 0
CHEST TIGHTNESS: 0
NAUSEA: 0
CHILLS: 0
VOICE CHANGE: 0
NECK PAIN: 1

## 2024-01-26 ASSESSMENT — PAIN SCALES - GENERAL: PAINLEVEL_OUTOF10: 3

## 2024-01-26 ASSESSMENT — PATIENT HEALTH QUESTIONNAIRE - PHQ9
1. LITTLE INTEREST OR PLEASURE IN DOING THINGS: NOT AT ALL
2. FEELING DOWN, DEPRESSED OR HOPELESS: NOT AT ALL
SUM OF ALL RESPONSES TO PHQ9 QUESTIONS 1 AND 2: 0

## 2024-01-26 ASSESSMENT — PAIN - FUNCTIONAL ASSESSMENT: PAIN_FUNCTIONAL_ASSESSMENT: 0-10

## 2024-01-26 ASSESSMENT — PAIN DESCRIPTION - DESCRIPTORS: DESCRIPTORS: DISCOMFORT;HEAVINESS

## 2024-01-26 NOTE — PROGRESS NOTES
Subjective   Patient ID: Pauline Orta is a 73 y.o. female who presents for Back Pain.  Patient is a 73-year-old female with prior cervical surgery cervical foraminal stenosis cervical facet arthropathy the presents today from follow-up of her C MBB's.  Patient denotes that she did not get relief of the pain.  She still complains of a base of neck and spasms.  She does state that her right shoulder total arthroplasty has been having complications her orthopedist is concerned about potential infection or other mechanical derangements that may need to have another surgical procedure.  Patient cannot exactly verbalize what these may be.  She is in physical therapy for her shoulder.  She does note that the Xanax that was provided to her C MBB's was not as effective at controlling her anxiety for the procedure she is curious about IV sedation.        Review of Systems   Constitutional:  Negative for activity change, chills, fatigue, fever and unexpected weight change.   HENT:  Negative for ear pain, sore throat and voice change.    Eyes:  Negative for visual disturbance.   Respiratory:  Negative for cough, chest tightness and shortness of breath.    Cardiovascular:  Negative for chest pain and palpitations.   Gastrointestinal:  Negative for diarrhea, nausea and vomiting.   Musculoskeletal:  Positive for neck pain.   Psychiatric/Behavioral:  Negative for behavioral problems, self-injury, sleep disturbance and suicidal ideas.        Objective   Physical Exam  Musculoskeletal:      Cervical back: Tenderness present. Decreased range of motion.      Comments: Positive facet loading         Assessment/Plan   Problem List Items Addressed This Visit             ICD-10-CM    Cervical spondylosis - Primary M47.812   With failure of the diagnostic C MBB's on the bilateral C3-4 C4-5 not providing patient with relief of symptoms I think it might be prudent to due to the foraminal narrowing to consider an interlaminar epidural  injection to provide patient with steroidal in the central canal to reduce inflammation and irritation to this region I discussed the risks and benefits associated with this procedure and patient would like to move forward for after consultation with Dr. LAXMI HOWARD we are going to move forward with a C6-7 interlaminar epidural steroid injection under fluoroscopy guidance with local sedation Dr. ROMERO.          Michael Carrera PA-C 01/26/24 1:13 PM

## 2024-01-26 NOTE — H&P (VIEW-ONLY)
Subjective   Patient ID: Pauline rOta is a 73 y.o. female who presents for Back Pain.  Patient is a 73-year-old female with prior cervical surgery cervical foraminal stenosis cervical facet arthropathy the presents today from follow-up of her C MBB's.  Patient denotes that she did not get relief of the pain.  She still complains of a base of neck and spasms.  She does state that her right shoulder total arthroplasty has been having complications her orthopedist is concerned about potential infection or other mechanical derangements that may need to have another surgical procedure.  Patient cannot exactly verbalize what these may be.  She is in physical therapy for her shoulder.  She does note that the Xanax that was provided to her C MBB's was not as effective at controlling her anxiety for the procedure she is curious about IV sedation.        Review of Systems   Constitutional:  Negative for activity change, chills, fatigue, fever and unexpected weight change.   HENT:  Negative for ear pain, sore throat and voice change.    Eyes:  Negative for visual disturbance.   Respiratory:  Negative for cough, chest tightness and shortness of breath.    Cardiovascular:  Negative for chest pain and palpitations.   Gastrointestinal:  Negative for diarrhea, nausea and vomiting.   Musculoskeletal:  Positive for neck pain.   Psychiatric/Behavioral:  Negative for behavioral problems, self-injury, sleep disturbance and suicidal ideas.        Objective   Physical Exam  Musculoskeletal:      Cervical back: Tenderness present. Decreased range of motion.      Comments: Positive facet loading         Assessment/Plan   Problem List Items Addressed This Visit             ICD-10-CM    Cervical spondylosis - Primary M47.812   With failure of the diagnostic C MBB's on the bilateral C3-4 C4-5 not providing patient with relief of symptoms I think it might be prudent to due to the foraminal narrowing to consider an interlaminar epidural  injection to provide patient with steroidal in the central canal to reduce inflammation and irritation to this region I discussed the risks and benefits associated with this procedure and patient would like to move forward for after consultation with Dr. LAXMI HOWARD we are going to move forward with a C6-7 interlaminar epidural steroid injection under fluoroscopy guidance with local sedation Dr. ROMERO.          Michael Carrera PA-C 01/26/24 1:13 PM

## 2024-01-29 ENCOUNTER — TELEPHONE (OUTPATIENT)
Dept: PAIN MEDICINE | Facility: CLINIC | Age: 74
End: 2024-01-29
Payer: COMMERCIAL

## 2024-01-29 LAB — ANA SER QL HEP2 SUBST: NEGATIVE

## 2024-01-31 ENCOUNTER — TREATMENT (OUTPATIENT)
Dept: PHYSICAL THERAPY | Facility: CLINIC | Age: 74
End: 2024-01-31
Payer: MEDICARE

## 2024-01-31 DIAGNOSIS — Z96.611 STATUS POST REVERSE TOTAL SHOULDER REPLACEMENT, RIGHT: ICD-10-CM

## 2024-01-31 PROCEDURE — 97035 APP MDLTY 1+ULTRASOUND EA 15: CPT | Mod: GP,CQ

## 2024-01-31 PROCEDURE — 97140 MANUAL THERAPY 1/> REGIONS: CPT | Mod: GP,CQ

## 2024-01-31 NOTE — PROGRESS NOTES
Physical Therapy Treatment    Patient Name: Pauline Orta  MRN: [id number]  Today's Date: 1/31/2024  Time Calculation  Start Time: 1415  Stop Time: 1455  Time Calculation (min): 40 min  PT Modalities Time Entry  Ultrasound Time Entry: 8  PT Therapeutic Procedures Time Entry  Manual Therapy Time Entry: 32  Visit # 7/10    Current Problem   1. Status post reverse total shoulder replacement, right  Follow Up In Physical Therapy          Subjective   General    Pt reports tenderness along R upper arm which at times can be very painful to the touch.  Precautions:   R RTSR  Pain    4-5/10  Post Treatment Pain Level 0/10    Objective   Supine AROM: flex 123 without pain.    Treatments:   UBE x3       AROM with light assist at times for supine flexion, horizontal ABD and ADD and S/L ABD x 30 reps each jeeping range within painless arcs.  Isometric bicep curls x 15  Myofascial release along R upper arm (laterally)    Modality:  US to R lateral upper arm at 1.5 w/cm2 at 1 Mghz for 8 minutes    Assessment   Assessment:    Pt responded well to tactile pressure along R upper arm after manual techniques.    Plan:    Progress with soft tissue restrictions and strengthening    OP EDUCATION:   Pt advised to start performing more reps with her exercises but to stay at the same resistances.    Goals:   Active       Mobility       Goal 1 (Progressing)       Start:  12/12/23    Expected End:  03/11/24       Pt will improve right sh AROM to WNL To improve I/ADLs.          Goal 2 (Progressing)       Start:  12/12/23    Expected End:  03/11/24       Pt will improve right sh strength to 5/5 to improve I/ADLs.            Pain       Goal 1 (Progressing)       Start:  12/12/23    Expected End:  03/11/24       Pt will perform all housework with 0/10 pain.         Goal 2 (Progressing)       Start:  12/12/23    Expected End:  03/11/24       Pt will perform all dressing activities with 0/10 pain.

## 2024-01-31 NOTE — TELEPHONE ENCOUNTER
PATIENT LEFT 2 VOICEMAILS AFTER I LEFT THE OFFICE.     CALLED THE PATIENT BACK AND LEFT A MESSAGE

## 2024-02-07 ENCOUNTER — TREATMENT (OUTPATIENT)
Dept: PHYSICAL THERAPY | Facility: CLINIC | Age: 74
End: 2024-02-07
Payer: MEDICARE

## 2024-02-07 DIAGNOSIS — Z96.611 STATUS POST REVERSE TOTAL SHOULDER REPLACEMENT, RIGHT: ICD-10-CM

## 2024-02-07 PROCEDURE — 97140 MANUAL THERAPY 1/> REGIONS: CPT | Mod: GP | Performed by: PHYSICAL THERAPIST

## 2024-02-07 PROCEDURE — 97530 THERAPEUTIC ACTIVITIES: CPT | Mod: GP | Performed by: PHYSICAL THERAPIST

## 2024-02-07 ASSESSMENT — PAIN SCALES - GENERAL: PAINLEVEL_OUTOF10: 6

## 2024-02-07 ASSESSMENT — PAIN - FUNCTIONAL ASSESSMENT: PAIN_FUNCTIONAL_ASSESSMENT: 0-10

## 2024-02-14 ENCOUNTER — TREATMENT (OUTPATIENT)
Dept: PHYSICAL THERAPY | Facility: CLINIC | Age: 74
End: 2024-02-14
Payer: MEDICARE

## 2024-02-14 DIAGNOSIS — Z96.611 STATUS POST REVERSE TOTAL SHOULDER REPLACEMENT, RIGHT: ICD-10-CM

## 2024-02-14 PROCEDURE — 97140 MANUAL THERAPY 1/> REGIONS: CPT | Mod: GP | Performed by: PHYSICAL THERAPIST

## 2024-02-14 PROCEDURE — 97110 THERAPEUTIC EXERCISES: CPT | Mod: GP | Performed by: PHYSICAL THERAPIST

## 2024-02-14 ASSESSMENT — PAIN - FUNCTIONAL ASSESSMENT: PAIN_FUNCTIONAL_ASSESSMENT: 0-10

## 2024-02-14 NOTE — PROGRESS NOTES
Physical Therapy Treatment    Patient Name: Pauline Orta  MRN: [id number]  Today's Date: 2/14/2024  Time Calculation  Start Time: 1450  Stop Time: 1530  Time Calculation (min): 40 min  PT Therapeutic Procedures Time Entry  Manual Therapy Time Entry: 10  Therapeutic Exercise Time Entry: 30    Insurance:  Visit number: 9 of 13  Authorization info: 13 visits 12/19-3/18   Insurance Type: Payor: [employer] / Plan: [employer]VANTAGE / Product Type: *No Product type* /     Current Problem   1. Status post reverse total shoulder replacement, right  Follow Up In Physical Therapy          Subjective   General   Reason for Referral: R RTS 11/3/23  Referred By: Dr. Castillo  General Comment: Pt reports her shoulder is feeling well but she is still having discomfort along mid delt region. She reports that the dry needling of her neck was helpful for a while but she is still having a grabbing sensation.  Precautions:     Pain   Pain Assessment: 0-10  Post Treatment Pain Level 2    Objective   Reports of bicep irritation with exercise     Treatments:  Therapeutic Exercise:  Therapeutic Exercise  Therapeutic Exercise Performed: Yes  Therapeutic Exercise Activity 1: UEB 6'  Therapeutic Exercise Activity 2: SA row GTB 10x3  Therapeutic Exercise Activity 3: LA row GTB 10x3  Therapeutic Exercise Activity 4: bicep curl with AW #2 10x3  Therapeutic Exercise Activity 5: walk out ER GTB 10x    Manual:  Manual Therapy  Manual Therapy Performed: Yes  Manual Therapy Activity 1: Dry needling  IDN performed this date. Pt educated on risks and benefits of dry needling. Pt provided verbal consent. All universal precautions followed.    4 - 30 mm along R bicep     No adverse response. All IDN guidelines and safety protocols followed.        Assessment   Assessment:   PT Assessment  Assessment Comment: Pt tolerated session well increased time for exercises due to verbal and tactile cuing for correct form. Use of dry needling to help  reduce biecep tension and potential referring pain patterns. Will assess results at next session    Plan:    Continue gentle progression of exercises and ROM     OP EDUCATION:   Continue with established HEP     Goals:   Active       Mobility       Goal 1 (Progressing)       Start:  12/12/23    Expected End:  03/11/24       Pt will improve right sh AROM to WNL To improve I/ADLs.          Goal 2 (Progressing)       Start:  12/12/23    Expected End:  03/11/24       Pt will improve right sh strength to 5/5 to improve I/ADLs.            Pain       Goal 1 (Progressing)       Start:  12/12/23    Expected End:  03/11/24       Pt will perform all housework with 0/10 pain.         Goal 2 (Progressing)       Start:  12/12/23    Expected End:  03/11/24       Pt will perform all dressing activities with 0/10 pain.

## 2024-02-20 ENCOUNTER — HOSPITAL ENCOUNTER (OUTPATIENT)
Dept: OPERATING ROOM | Facility: HOSPITAL | Age: 74
Discharge: HOME | End: 2024-02-20
Payer: COMMERCIAL

## 2024-02-20 ENCOUNTER — HOSPITAL ENCOUNTER (OUTPATIENT)
Dept: RADIOLOGY | Facility: HOSPITAL | Age: 74
Discharge: HOME | End: 2024-02-20
Payer: COMMERCIAL

## 2024-02-20 VITALS
OXYGEN SATURATION: 93 % | HEART RATE: 69 BPM | SYSTOLIC BLOOD PRESSURE: 101 MMHG | WEIGHT: 150 LBS | RESPIRATION RATE: 16 BRPM | HEIGHT: 63 IN | DIASTOLIC BLOOD PRESSURE: 70 MMHG | TEMPERATURE: 96.8 F | BODY MASS INDEX: 26.58 KG/M2

## 2024-02-20 DIAGNOSIS — M48.02 FORAMINAL STENOSIS OF CERVICAL REGION: ICD-10-CM

## 2024-02-20 PROCEDURE — 2500000001 HC RX 250 WO HCPCS SELF ADMINISTERED DRUGS (ALT 637 FOR MEDICARE OP): Performed by: ANESTHESIOLOGY

## 2024-02-20 PROCEDURE — 2500000004 HC RX 250 GENERAL PHARMACY W/ HCPCS (ALT 636 FOR OP/ED): Performed by: ANESTHESIOLOGY

## 2024-02-20 PROCEDURE — 62321 NJX INTERLAMINAR CRV/THRC: CPT | Performed by: ANESTHESIOLOGY

## 2024-02-20 PROCEDURE — 2500000005 HC RX 250 GENERAL PHARMACY W/O HCPCS: Performed by: ANESTHESIOLOGY

## 2024-02-20 RX ORDER — TRIAMCINOLONE ACETONIDE 40 MG/ML
INJECTION, SUSPENSION INTRA-ARTICULAR; INTRAMUSCULAR AS NEEDED
Status: COMPLETED | OUTPATIENT
Start: 2024-02-20 | End: 2024-02-20

## 2024-02-20 RX ORDER — ALPRAZOLAM 0.5 MG/1
0.5 TABLET ORAL ONCE
Status: COMPLETED | OUTPATIENT
Start: 2024-02-20 | End: 2024-02-20

## 2024-02-20 RX ORDER — LIDOCAINE HYDROCHLORIDE 5 MG/ML
INJECTION, SOLUTION INFILTRATION; PERINEURAL AS NEEDED
Status: COMPLETED | OUTPATIENT
Start: 2024-02-20 | End: 2024-02-20

## 2024-02-20 RX ADMIN — LIDOCAINE HYDROCHLORIDE 6 ML: 5 INJECTION, SOLUTION INFILTRATION; PERINEURAL at 10:52

## 2024-02-20 RX ADMIN — ALPRAZOLAM 0.5 MG: 0.5 TABLET ORAL at 10:16

## 2024-02-20 RX ADMIN — Medication 3 ML: at 10:54

## 2024-02-20 RX ADMIN — TRIAMCINOLONE ACETONIDE 60 MG: 40 INJECTION, SUSPENSION INTRA-ARTICULAR; INTRAMUSCULAR at 10:54

## 2024-02-20 ASSESSMENT — PAIN DESCRIPTION - DESCRIPTORS: DESCRIPTORS: ACHING;TIGHTNESS

## 2024-02-20 ASSESSMENT — PAIN - FUNCTIONAL ASSESSMENT
PAIN_FUNCTIONAL_ASSESSMENT: 0-10
PAIN_FUNCTIONAL_ASSESSMENT: 0-10

## 2024-02-20 ASSESSMENT — PAIN SCALES - GENERAL
PAINLEVEL_OUTOF10: 3
PAINLEVEL_OUTOF10: 6

## 2024-02-20 NOTE — POST-PROCEDURE NOTE
Band aid dry and intact. No neuro deficits. Ready for discharge. Ginger ale and cookie.Wheelchair to car.

## 2024-02-20 NOTE — OP NOTE
* No procedures listed * Operative Note     Date: 2024  OR Location: [facility] GI Lab Endosc1 OR    Name: Pauline Orta, : 1950, Age: 73 y.o., MRN: [id number], Sex: female    Diagnosis  * No Diagnosis Codes entered * * No Diagnosis Codes entered *     Procedures  * No procedures documented on diagnosis form *    Surgeons   * No surgeons found in log *    Resident/Fellow/Other Assistant:  * No surgeons found in log *    Procedure Summary  Anesthesia: * No anesthesia type entered *  ASA: ASA status not filed in the log.  Anesthesia Staff: No anesthesia staff entered.  Estimated Blood Loss: 0mL  Intra-op Medications: * Intraprocedure medication information is unavailable because the case start and end events have not been set *      Intraprocedure I/O Totals       None           Specimen: No specimens collected     Staff:   No surgical staff documented.         Drains and/or Catheters: * None in log *    Tourniquet Times:         Implants:     Findings:     Indications: Pauline Orta is an 73 y.o. female who is having surgery for * No pre-op diagnosis entered *.     The patient was seen in the preoperative area. The risks, benefits, complications, treatment options, non-operative alternatives, expected recovery and outcomes were discussed with the patient. The possibilities of reaction to medication, pulmonary aspiration, injury to surrounding structures, bleeding, recurrent infection, the need for additional procedures, failure to diagnose a condition, and creating a complication requiring transfusion or operation were discussed with the patient. The patient concurred with the proposed plan, giving informed consent.  The site of surgery was properly noted/marked if necessary per policy. The patient has been actively warmed in preoperative area. Preoperative antibiotics are not indicated. Venous thrombosis prophylaxis are not indicated.    Procedure Details: The patient was brought to the procedure  room and placed in the prone position.  Sterile prep and drape with ChloraPrep and sterile towels.  6 mL of half percent lidocaine were injected through a 25-gauge needle for local anesthesia.  An 18-gauge Tuohy needle was guided to the interlaminar epidural space at the C6-C7 level by the loss-of-resistance technique under fluoroscopic guidance.  After negative aspiration, 1 mL of contrast was injected through the needle to ensure proper needle placement.  Then 2 mL of half percent lidocaine and 60 mg of triamcinolone were injected through the needle.  The needle was removed and the patient was transferred to recovery.   Complications:  None; patient tolerated the procedure well.    Disposition: PACU - hemodynamically stable.  Condition: stable         Additional Details:     Attending Attestation: I performed the procedure.    *No primary surgeon found*

## 2024-02-20 NOTE — DISCHARGE INSTRUCTIONS
You underwent a procedure today    After most procedures, it is recommended that you relax and limit your activity for the remainder of the day unless you have been told otherwise by your pain physician.  You should not drive a car, operate machinery, or make important legal decisions unless otherwise directed by your pain physician.  You may resume your normal activity, including exercise, tomorrow.      Keep a written pain diary of how much pain relief you experienced following the procedure and the length of time of pain relief you experienced pain relief. Following diagnostic injections like medial branch nerve blocks, sacroiliac joint blocks, stellate ganglion injections and other blocks, it is very important you record the specific amount of pain relief you experienced immediately after the injectionand how long it lasted. Your doctor will ask you for this information at your follow up visit.     For all injections, please keep the injection site dry and inspect the site for a couple of days. You may remove the Band-Aid the day of the injection at any time.     Some discomfort, bruising or slight swelling may occur at the injection site. This is not abnormal if it occurs.  If needed you may:    -Take over the counter medication such as Tylenol or Motrin.   -Apply an ice pack for 30 minutes, 2 to 3 times a day for the first 24 hours.     You may shower today; no soaking baths, hot tubs, whirlpools or swimming pools for two days.      If you are given steroids in your injection, it may take 3-5 days for the steroid medication to take effect. You may notice a worsening of your symptoms for 1-2 days after the injection. This is not abnormal.  You may use acetaminophen, ibuprofen, or prescription medication that your doctor may have prescribed for you if you need to do so.     A few common side effects of steroids include facial flushing, sweating, restlessness, irritability,difficulty sleeping, increase in blood  sugar, and increased blood pressure. If you have diabetes, please monitor your blood sugar at least once a day for at least 5 days. If you have poorly controlled high blood pressure, monitoryour blood pressure for at least 2 days and contact your primary care physician if these numbers are unusually high for you.      If you take aspirin or non-steroidal anti-inflammatory drugs (examples are Motrin, Advil, ibuprofen, Naprosyn, Voltaren, Relafen, etc.) you may restart these this evening.    You do not need to discontinue non-aspirin-containing pain medications prior to an injection (examples: Celebrex, tramadol, hydrocodone and acetaminophen).      If you take a blood thinning medication (Coumadin, Lovenox, Fragmin,Ticlid, Plavix, Pradaxa, etc.), please discuss this with your primary care physician/cardiologist and your pain physician. These medications MUST be discontinued before you can have an injection safely, without the risk of uncontrolled bleeding. If these medications are not discontinued for an appropriate period of time, you will not be able to receivean injection.      If you are taking Coumadin, please have your INR checked the morning of your procedure and bringthe result to your appointment unless otherwise instructed. If your INR is over 1.2, your injection may need to be rescheduled to avoid uncontrolled bleeding from the needle placement.     Call [facility] at [phone] between 8am-4pm Monday - Friday if you are experiencing the following:    If you received an epidural or spinal injection:    -Headache that doesnot go away with medicine, is worse when sitting or standing up, and is greatly relieved upon lying down.   -Severe pain worse than or different than your baseline pain.   -Chills or fever (101º F or greater).   -Drainage or signs of infection at the injection site     Go directly to the Emergency Department if you are experiencing the following and received an  epidural or spinal injection:   -Abrupt weakness or progressive weakness in your legs that starts after you leave the clinic.   -Abrupt severe or worsening numbness in your legs.   -Inability to urinate after the injection or loss of bowel or bladder control without the urge to defecate or urinate.     If you have a clinical question that cannot wait until your next appointment, please call [phone] between 8am-4pm Monday - Friday or send a [employer] message. We do our best to return all non-emergency messages within 24 hours, Monday - Friday. A nurse or physician will return your message.      If you need to cancel an appointment, please call the scheduling staff at [phone] during normal business hours or leave a message at least 24 hours in advance.     If you are going to be sedated for your next procedure, you MUST have responsible adult who can legally drive accompany you home. You cannot eat or drink for eight hours prior to the planned procedure if you are going to receive sedation. You may take your non-blood thinning medications with a small sip of water.

## 2024-02-23 ENCOUNTER — LAB (OUTPATIENT)
Dept: LAB | Facility: LAB | Age: 74
End: 2024-02-23
Payer: COMMERCIAL

## 2024-02-23 DIAGNOSIS — M25.50 PAIN IN UNSPECIFIED JOINT: Primary | ICD-10-CM

## 2024-02-23 LAB
CRP SERPL-MCNC: <0.3 MG/DL (ref 0–2)
ERYTHROCYTE [SEDIMENTATION RATE] IN BLOOD BY WESTERGREN METHOD: 10 MM/H (ref 0–30)
RHEUMATOID FACT SER NEPH-ACNC: <10 IU/ML (ref 0–15)

## 2024-02-23 PROCEDURE — 85652 RBC SED RATE AUTOMATED: CPT

## 2024-02-23 PROCEDURE — 86140 C-REACTIVE PROTEIN: CPT

## 2024-02-23 PROCEDURE — 36415 COLL VENOUS BLD VENIPUNCTURE: CPT

## 2024-02-23 PROCEDURE — 86431 RHEUMATOID FACTOR QUANT: CPT

## 2024-02-23 PROCEDURE — 86038 ANTINUCLEAR ANTIBODIES: CPT

## 2024-02-26 LAB — ANA SER QL HEP2 SUBST: NEGATIVE

## 2024-02-28 ENCOUNTER — TREATMENT (OUTPATIENT)
Dept: PHYSICAL THERAPY | Facility: CLINIC | Age: 74
End: 2024-02-28
Payer: MEDICARE

## 2024-02-28 DIAGNOSIS — Z96.611 STATUS POST REVERSE TOTAL SHOULDER REPLACEMENT, RIGHT: ICD-10-CM

## 2024-02-28 PROCEDURE — 97140 MANUAL THERAPY 1/> REGIONS: CPT | Mod: GP | Performed by: PHYSICAL THERAPIST

## 2024-02-28 PROCEDURE — 97110 THERAPEUTIC EXERCISES: CPT | Mod: GP | Performed by: PHYSICAL THERAPIST

## 2024-02-28 ASSESSMENT — PAIN SCALES - GENERAL: PAINLEVEL_OUTOF10: 4

## 2024-02-28 ASSESSMENT — PAIN - FUNCTIONAL ASSESSMENT: PAIN_FUNCTIONAL_ASSESSMENT: 0-10

## 2024-02-28 NOTE — PROGRESS NOTES
Physical Therapy Treatment    Patient Name: Pauline Orta  MRN: [id number]  Today's Date: 2/28/2024  Time Calculation  Start Time: 1405  Stop Time: 1445  Time Calculation (min): 40 min  PT Therapeutic Procedures Time Entry  Manual Therapy Time Entry: 10  Therapeutic Exercise Time Entry: 30    Insurance:  Visit number: 10 of 13  Authorization info: 13 visits 12/19-3/18    Insurance Type: Payor: ANTH MEDICARE / Plan: ANTH DUAL ADVANTAGE / Product Type: *No Product type* /     Current Problem   1. Status post reverse total shoulder replacement, right  Follow Up In Physical Therapy          Subjective   General   Reason for Referral: R RTS 11/3/23  Referred By: Dr. Castillo  General Comment: Pt reports that she had a cervical injection done last week. She reports that the dry needling last session really helped the arm.  Precautions:     Pain   Pain Assessment: 0-10  Pain Score: 4  Post Treatment Pain Level 4    Objective   Reduced flexion strengthand control  Noted anterior protrusion type deformity along anterior shoulder    Treatments:  Therapeutic Exercise:  Therapeutic Exercise  Therapeutic Exercise Performed: Yes  Therapeutic Exercise Activity 1: UEB 6'  Therapeutic Exercise Activity 2: supine flexion no resistance 10x; #1 10x2  Therapeutic Exercise Activity 3: supine chest press #1 10x3  Therapeutic Exercise Activity 4: supine ER/IR in netural position 10x3    Manual:  Manual Therapy  Manual Therapy Performed: Yes  Manual Therapy Activity 1: Dry needling  IDN performed this date. Pt educated on risks and benefits of dry needling. Pt provided verbal consent. All universal precautions followed.     4 - 30 mm along R bicep      No adverse response. All IDN guidelines and safety protocols followed.      Assessment   Assessment:   PT Assessment  Assessment Comment: Pt performs exercises well however noted reduction in tolerance due to pain requiring incresaed rest breaks.    Plan:    Continue progression of  strength and stability     OP EDUCATION:   Continue with established HEP     Goals:   Active       Mobility       Goal 1 (Progressing)       Start:  12/12/23    Expected End:  03/11/24       Pt will improve right sh AROM to WNL To improve I/ADLs.          Goal 2 (Progressing)       Start:  12/12/23    Expected End:  03/11/24       Pt will improve right sh strength to 5/5 to improve I/ADLs.            Pain       Goal 1 (Progressing)       Start:  12/12/23    Expected End:  03/11/24       Pt will perform all housework with 0/10 pain.         Goal 2 (Progressing)       Start:  12/12/23    Expected End:  03/11/24       Pt will perform all dressing activities with 0/10 pain.

## 2024-02-29 ENCOUNTER — TELEPHONE (OUTPATIENT)
Dept: PRIMARY CARE | Facility: CLINIC | Age: 74
End: 2024-02-29
Payer: COMMERCIAL

## 2024-02-29 DIAGNOSIS — G43.909 MIGRAINE WITHOUT STATUS MIGRAINOSUS, NOT INTRACTABLE, UNSPECIFIED MIGRAINE TYPE: Primary | ICD-10-CM

## 2024-03-01 DIAGNOSIS — M79.7 FIBROMYALGIA: ICD-10-CM

## 2024-03-01 DIAGNOSIS — G89.29 CHRONIC RIGHT SHOULDER PAIN: ICD-10-CM

## 2024-03-01 DIAGNOSIS — M25.511 CHRONIC RIGHT SHOULDER PAIN: ICD-10-CM

## 2024-03-01 RX ORDER — DULOXETIN HYDROCHLORIDE 30 MG/1
30 CAPSULE, DELAYED RELEASE ORAL DAILY
Qty: 30 CAPSULE | Refills: 1 | Status: SHIPPED | OUTPATIENT
Start: 2024-03-01 | End: 2024-03-08 | Stop reason: SDUPTHER

## 2024-03-06 ENCOUNTER — TREATMENT (OUTPATIENT)
Dept: PHYSICAL THERAPY | Facility: CLINIC | Age: 74
End: 2024-03-06
Payer: MEDICARE

## 2024-03-06 DIAGNOSIS — Z96.611 STATUS POST REVERSE TOTAL SHOULDER REPLACEMENT, RIGHT: ICD-10-CM

## 2024-03-06 PROCEDURE — 97110 THERAPEUTIC EXERCISES: CPT | Mod: GP | Performed by: PHYSICAL THERAPIST

## 2024-03-06 PROCEDURE — 97140 MANUAL THERAPY 1/> REGIONS: CPT | Mod: GP | Performed by: PHYSICAL THERAPIST

## 2024-03-06 ASSESSMENT — PAIN - FUNCTIONAL ASSESSMENT: PAIN_FUNCTIONAL_ASSESSMENT: 0-10

## 2024-03-06 ASSESSMENT — PAIN SCALES - GENERAL: PAINLEVEL_OUTOF10: 7

## 2024-03-06 NOTE — PROGRESS NOTES
Physical Therapy Treatment    Patient Name: Pauline Orta  MRN: [id number]  Today's Date: 3/6/2024  Time Calculation  Start Time: 1315  Stop Time: 1358  Time Calculation (min): 43 min  PT Therapeutic Procedures Time Entry  Manual Therapy Time Entry: 10  Therapeutic Exercise Time Entry: 33    Insurance:  Visit number: 11 of 13  Authorization info: 13 Visits 12/19-3/18   Insurance Type: Payor: ANTH MEDICARE / Plan: ANTH DUAL ADVANTAGE / Product Type: *No Product type* /     Current Problem   1. Status post reverse total shoulder replacement, right  Follow Up In Physical Therapy          Subjective   General   Reason for Referral: R RTS 11/3/23  Referred By: Dr. Castillo  General Comment: Pt does not report any changes with pain or ROM deficits.  Precautions:  Precautions  Precautions Comment: s/p R RTS  Pain   Pain Assessment: 0-10  Pain Score: 7  Post Treatment Pain Level 6    Objective   R GH flexion AROM 90     Treatments:  Therapeutic Exercise:  Therapeutic Exercise  Therapeutic Exercise Performed: Yes  Therapeutic Exercise Activity 1: UEB 6'  Therapeutic Exercise Activity 2: slide board flexion L5 10x; L3 10x2  Therapeutic Exercise Activity 3: slide board abduction attempted unable  Therapeutic Exercise Activity 4: yellow clips x2  Therapeutic Exercise Activity 5: rainbow loop low x3    Manual:  Manual Therapy  Manual Therapy Performed: Yes  Manual Therapy Activity 1: Dry needling  IDN performed this date. Pt educated on risks and benefits of dry needling. Pt provided verbal consent. All universal precautions followed.    4 - 30 mm R bicep   1 - 30 mm R infraspinatus  1 - 30 mm R supraspinatus     No adverse response. All IDN guidelines and safety protocols followed.       Assessment   Assessment:   PT Assessment  Assessment Comment: Pt continues to have pain with motion despite different therapeutic interventions to help reduce pain levels. WIll perform reassessment next session    Plan:    Will perform  reassessment next session after MD appointment     OP EDUCATION:   Continue with established HEP     Goals:   Active       Mobility       Goal 1 (Progressing)       Start:  12/12/23    Expected End:  03/11/24       Pt will improve right sh AROM to WNL To improve I/ADLs.          Goal 2 (Progressing)       Start:  12/12/23    Expected End:  03/11/24       Pt will improve right sh strength to 5/5 to improve I/ADLs.            Pain       Goal 1 (Progressing)       Start:  12/12/23    Expected End:  03/11/24       Pt will perform all housework with 0/10 pain.         Goal 2 (Progressing)       Start:  12/12/23    Expected End:  03/11/24       Pt will perform all dressing activities with 0/10 pain.

## 2024-03-07 NOTE — TELEPHONE ENCOUNTER
PATIENT RETURNING CALL STATING THE NEUROLOGY REFERRAL IS FOR SHE HAS TO ESTABLISH A NEW NEUROLOGIST BECAUSE THE ONE SHE HAD LEFT THE [facility] SHE HAS MIGRAINES.

## 2024-03-08 ENCOUNTER — OFFICE VISIT (OUTPATIENT)
Dept: PAIN MEDICINE | Facility: CLINIC | Age: 74
End: 2024-03-08
Payer: MEDICARE

## 2024-03-08 VITALS
DIASTOLIC BLOOD PRESSURE: 88 MMHG | HEART RATE: 90 BPM | WEIGHT: 149.91 LBS | BODY MASS INDEX: 26.56 KG/M2 | SYSTOLIC BLOOD PRESSURE: 126 MMHG | HEIGHT: 63 IN

## 2024-03-08 DIAGNOSIS — M79.7 FIBROMYALGIA: ICD-10-CM

## 2024-03-08 DIAGNOSIS — M25.511 CHRONIC RIGHT SHOULDER PAIN: ICD-10-CM

## 2024-03-08 DIAGNOSIS — G89.29 CHRONIC RIGHT SHOULDER PAIN: ICD-10-CM

## 2024-03-08 DIAGNOSIS — G89.29 OTHER CHRONIC PAIN: ICD-10-CM

## 2024-03-08 DIAGNOSIS — M47.812 CERVICAL SPONDYLOSIS WITHOUT MYELOPATHY: ICD-10-CM

## 2024-03-08 PROCEDURE — 1036F TOBACCO NON-USER: CPT | Performed by: PHYSICIAN ASSISTANT

## 2024-03-08 PROCEDURE — 99214 OFFICE O/P EST MOD 30 MIN: CPT | Performed by: PHYSICIAN ASSISTANT

## 2024-03-08 PROCEDURE — 1160F RVW MEDS BY RX/DR IN RCRD: CPT | Performed by: PHYSICIAN ASSISTANT

## 2024-03-08 PROCEDURE — 1159F MED LIST DOCD IN RCRD: CPT | Performed by: PHYSICIAN ASSISTANT

## 2024-03-08 PROCEDURE — 1125F AMNT PAIN NOTED PAIN PRSNT: CPT | Performed by: PHYSICIAN ASSISTANT

## 2024-03-08 RX ORDER — TIZANIDINE 4 MG/1
4 TABLET ORAL EVERY 8 HOURS PRN
Qty: 90 TABLET | Refills: 2 | Status: SHIPPED | OUTPATIENT
Start: 2024-03-08 | End: 2024-04-03 | Stop reason: ALTCHOICE

## 2024-03-08 RX ORDER — GABAPENTIN 800 MG/1
800 TABLET ORAL 3 TIMES DAILY
Qty: 90 TABLET | Refills: 3 | Status: SHIPPED | OUTPATIENT
Start: 2024-03-08 | End: 2024-06-06 | Stop reason: SDUPTHER

## 2024-03-08 RX ORDER — DULOXETIN HYDROCHLORIDE 30 MG/1
30 CAPSULE, DELAYED RELEASE ORAL DAILY
Qty: 30 CAPSULE | Refills: 1 | Status: SHIPPED | OUTPATIENT
Start: 2024-03-08 | End: 2024-06-06 | Stop reason: SDUPTHER

## 2024-03-08 ASSESSMENT — ENCOUNTER SYMPTOMS
COUGH: 0
ACTIVITY CHANGE: 0
PALPITATIONS: 0
NAUSEA: 0
FATIGUE: 0
CHILLS: 0
SORE THROAT: 0
SHORTNESS OF BREATH: 0
DIARRHEA: 0
SLEEP DISTURBANCE: 0
UNEXPECTED WEIGHT CHANGE: 0
NECK PAIN: 1
FEVER: 0
VOMITING: 0
VOICE CHANGE: 0
CHEST TIGHTNESS: 0

## 2024-03-08 ASSESSMENT — PATIENT HEALTH QUESTIONNAIRE - PHQ9
SUM OF ALL RESPONSES TO PHQ9 QUESTIONS 1 AND 2: 0
1. LITTLE INTEREST OR PLEASURE IN DOING THINGS: NOT AT ALL
2. FEELING DOWN, DEPRESSED OR HOPELESS: NOT AT ALL

## 2024-03-08 ASSESSMENT — PAIN SCALES - GENERAL: PAINLEVEL_OUTOF10: 4

## 2024-03-08 ASSESSMENT — PAIN - FUNCTIONAL ASSESSMENT: PAIN_FUNCTIONAL_ASSESSMENT: 0-10

## 2024-03-08 ASSESSMENT — PAIN DESCRIPTION - DESCRIPTORS: DESCRIPTORS: TINGLING

## 2024-03-08 NOTE — PROGRESS NOTES
Subjective   Patient ID: Pauline Orta is a 73 y.o. female who presents for post zahra.  Patient is a 73-year-old female with cervical radiculopathy left shoulder surgery with continued pain sleep issues presents today for follow-up.  Patient does note that the epidural injection drastically reduced her neck and shoulder pain. She is still continues to have left upper extremity symptoms intermittently she continues to have pain in conjunction with certain range of motion.  Her shoulder physical therapist has recommended a consultation with a different shoulder surgeon.  She will be having continued dry needling but she still has continued pain.        Review of Systems   Constitutional:  Negative for activity change, chills, fatigue, fever and unexpected weight change.   HENT:  Negative for ear pain, sore throat and voice change.    Eyes:  Negative for visual disturbance.   Respiratory:  Negative for cough, chest tightness and shortness of breath.    Cardiovascular:  Negative for chest pain and palpitations.   Gastrointestinal:  Negative for diarrhea, nausea and vomiting.   Musculoskeletal:  Positive for neck pain.   Psychiatric/Behavioral:  Negative for behavioral problems, self-injury, sleep disturbance and suicidal ideas.        Objective   Physical Exam  Vitals reviewed.   Constitutional:       Appearance: Normal appearance.   HENT:      Head: Normocephalic and atraumatic.      Mouth/Throat:      Mouth: Mucous membranes are moist.   Neck:      Vascular: No JVD.   Pulmonary:      Effort: Pulmonary effort is normal. No tachypnea or bradypnea.   Abdominal:      Palpations: Abdomen is soft.   Musculoskeletal:      Cervical back: Spasms and tenderness present. Normal range of motion.        Back:       Comments: Positive facet loading   Skin:     General: Skin is warm and dry.   Neurological:      Mental Status: She is alert and oriented to person, place, and time.   Psychiatric:         Mood and Affect: Mood  normal.         Behavior: Behavior normal. Behavior is cooperative.         Assessment/Plan   Problem List Items Addressed This Visit             ICD-10-CM    Chronic pain G89.29     Other Visit Diagnoses         Codes    Cervical spondylosis without myelopathy     M47.812    Fibromyalgia     M79.7    Chronic right shoulder pain     M25.511, G89.29        I had nice discussion with the patient today our plan will be as follows.      Radiology: [ none at this time ]      Physically:  [ continue modification of activities, healthy lifestyle choice patient should continue physical therapy and have a consult with a different orthopedist.  I think a supplement for adding some TENS unit to provide additional benefit in duration and improvement in ADLs.  We are ordering a TENS unit for the neck and shoulder pain]      Psychologically:  [ No acute psychological concerns ]      Medication: [I will refill the medications at the same dose and frequency. We will continue to monitor the patient every 3 months for compliance, adverse reaction or interations The patient continues to see benefit and improvement in their quality of life and ability to maintain ADLs. Patient educated about the risks of taking sedatives and operating a motor vehicle. Patient reports no adverse side effects to current medication regimen. Current regimen does allow patient to maintain ADLs. Oarrs has been reviewed. No suspicion of diversion or abuse. Compliance with medication regime, no use of illicit drugs, no sharing of narcotic medications with others, do not use others narcotic medication, and to avoid alcohol use. Patient has been educated on the risks, benefits, and alternatives of controlled substances as well as the proper way to store these medications.   The patient and I discussed the nature of this medication and its side effects. We discussed tolerance, physical dependence, psychological dependence, addiction and opioid-induced  hyperalgesia ]      Duration:  [  ]      Intervention:  [ none at this time. Patient is stable.  ]           Michael Carrera PA-C 03/08/24 1:58 PM

## 2024-03-13 ENCOUNTER — OFFICE VISIT (OUTPATIENT)
Dept: ORTHOPEDIC SURGERY | Facility: HOSPITAL | Age: 74
End: 2024-03-13
Payer: MEDICARE

## 2024-03-13 ENCOUNTER — HOSPITAL ENCOUNTER (OUTPATIENT)
Dept: RADIOLOGY | Facility: HOSPITAL | Age: 74
Discharge: HOME | End: 2024-03-13
Payer: MEDICARE

## 2024-03-13 ENCOUNTER — APPOINTMENT (OUTPATIENT)
Dept: PHYSICAL THERAPY | Facility: CLINIC | Age: 74
End: 2024-03-13
Payer: MEDICARE

## 2024-03-13 DIAGNOSIS — Z96.611 S/P SHOULDER REPLACEMENT, RIGHT: ICD-10-CM

## 2024-03-13 DIAGNOSIS — G89.29 CHRONIC RIGHT SHOULDER PAIN: ICD-10-CM

## 2024-03-13 DIAGNOSIS — M25.511 CHRONIC RIGHT SHOULDER PAIN: ICD-10-CM

## 2024-03-13 PROCEDURE — 1160F RVW MEDS BY RX/DR IN RCRD: CPT | Performed by: ORTHOPAEDIC SURGERY

## 2024-03-13 PROCEDURE — 99214 OFFICE O/P EST MOD 30 MIN: CPT | Performed by: ORTHOPAEDIC SURGERY

## 2024-03-13 PROCEDURE — 73030 X-RAY EXAM OF SHOULDER: CPT | Mod: RT

## 2024-03-13 PROCEDURE — 73030 X-RAY EXAM OF SHOULDER: CPT | Mod: RIGHT SIDE | Performed by: RADIOLOGY

## 2024-03-13 PROCEDURE — 1036F TOBACCO NON-USER: CPT | Performed by: ORTHOPAEDIC SURGERY

## 2024-03-13 PROCEDURE — 1159F MED LIST DOCD IN RCRD: CPT | Performed by: ORTHOPAEDIC SURGERY

## 2024-03-13 PROCEDURE — 1125F AMNT PAIN NOTED PAIN PRSNT: CPT | Performed by: ORTHOPAEDIC SURGERY

## 2024-03-13 NOTE — LETTER
March 19, 2024     Pauline Orta  [address]  [facility] [facility] [place] [address]    Patient: Pauline Orta   YOB: 1950   Date of Visit: 3/13/2024       Dear    Pauline Orta:    Thank you for referring Pauline Orta to me for evaluation. Below are my notes for this consultation.  If you have questions, please do not hesitate to call me. I look forward to following your patient along with you.       Sincerely,     Jay Hodges MD      CC: No Recipients  ______________________________________________________________________________________    For painful arthroplasty right shoulder she is 4 months status post reverse arthroplasty and has had pain and altered contour of her shoulder that her therapist was concerned about.  She has not had any constitutional symptoms.  She did have a CT scan postoperatively, the reading of which is available.  She also had laboratory studies obtained.    The patient is pleasant and cooperative.  The patient is alert and oriented ×3.  Auditory function is intact.  The patient is a good historian.  The patient is not in acute distress.  Eye exam significant for nonicteric sclera, intact ocular muscle movement.  Breathing is rhythmic symmetric and nonlabored.  Right radial pulse palpable no peripheral edema incision has healed well no erythema induration or drainage.  There is no effusion.  Prosthesis is palpable anteriorly and the deltoid contracts appropriately on attempted abduction against resistance.  Contour is as expected after reverse arthroplasty.  Passive range of motion of the shoulder with the arm at the side internal and external rotation is painless passive elevation to 90 degrees is painless but attempted external rotation from the 90 degree abducted position is painful.  Patient can be carefully abducted 90 degrees and externally rotated to 70 degrees.  Motor power abduction 4 external rotation 4  internal rotation 5.   strength 5.    Laboratory studies most recent ESR is 10 on 2/23/2024, down from 19 on 1/26/2024  Most recent CBC was on 1/26/2024 significant for normal WBC count 9.6K with no left shift.    A new x-ray of the right shoulder was obtained today and it shows prosthetic components in good position with no evidence of periprosthetic fracture or subluxation or loosening.    Painful shoulder arthroplasty    Patient has painful shoulder arthroplasty with no sign of mechanical problem or infection.  I have recommended continued physical therapy for at least 8 more weeks for anterior deltoid strengthening primarily.  Prognosis is very good I do not see any problem with the prosthesis or any need for any further surgical intervention at this time.  Follow-up will be as needed.    This was dictated using voice recognition software and not corrected for grammatical or spelling errors.

## 2024-03-13 NOTE — PROGRESS NOTES
For painful arthroplasty right shoulder she is 4 months status post reverse arthroplasty and has had pain and altered contour of her shoulder that her therapist was concerned about.  She has not had any constitutional symptoms.  She did have a CT scan postoperatively, the reading of which is available.  She also had laboratory studies obtained.    The patient is pleasant and cooperative.  The patient is alert and oriented ×3.  Auditory function is intact.  The patient is a good historian.  The patient is not in acute distress.  Eye exam significant for nonicteric sclera, intact ocular muscle movement.  Breathing is rhythmic symmetric and nonlabored.  Right radial pulse palpable no peripheral edema incision has healed well no erythema induration or drainage.  There is no effusion.  Prosthesis is palpable anteriorly and the deltoid contracts appropriately on attempted abduction against resistance.  Contour is as expected after reverse arthroplasty.  Passive range of motion of the shoulder with the arm at the side internal and external rotation is painless passive elevation to 90 degrees is painless but attempted external rotation from the 90 degree abducted position is painful.  Patient can be carefully abducted 90 degrees and externally rotated to 70 degrees.  Motor power abduction 4 external rotation 4 internal rotation 5.   strength 5.    Laboratory studies most recent ESR is 10 on 2/23/2024, down from 19 on 1/26/2024  Most recent CBC was on 1/26/2024 significant for normal WBC count 9.6K with no left shift.    A new x-ray of the right shoulder was obtained today and it shows prosthetic components in good position with no evidence of periprosthetic fracture or subluxation or loosening.    Painful shoulder arthroplasty    Patient has painful shoulder arthroplasty with no sign of mechanical problem or infection.  I have recommended continued physical therapy for at least 8 more weeks for anterior deltoid  strengthening primarily.  Prognosis is very good I do not see any problem with the prosthesis or any need for any further surgical intervention at this time.  Follow-up will be as needed.    This was dictated using voice recognition software and not corrected for grammatical or spelling errors.

## 2024-03-14 ENCOUNTER — APPOINTMENT (OUTPATIENT)
Dept: PHYSICAL THERAPY | Facility: CLINIC | Age: 74
End: 2024-03-14
Payer: MEDICARE

## 2024-03-18 ENCOUNTER — APPOINTMENT (OUTPATIENT)
Dept: PHYSICAL THERAPY | Facility: CLINIC | Age: 74
End: 2024-03-18
Payer: MEDICARE

## 2024-03-18 DIAGNOSIS — F41.9 ANXIETY: ICD-10-CM

## 2024-03-21 ENCOUNTER — TREATMENT (OUTPATIENT)
Dept: PHYSICAL THERAPY | Facility: CLINIC | Age: 74
End: 2024-03-21
Payer: MEDICARE

## 2024-03-21 DIAGNOSIS — Z96.611 STATUS POST REVERSE TOTAL SHOULDER REPLACEMENT, RIGHT: ICD-10-CM

## 2024-03-21 PROCEDURE — 97530 THERAPEUTIC ACTIVITIES: CPT | Mod: GP | Performed by: PHYSICAL THERAPIST

## 2024-03-21 PROCEDURE — 97110 THERAPEUTIC EXERCISES: CPT | Mod: GP | Performed by: PHYSICAL THERAPIST

## 2024-03-21 ASSESSMENT — PAIN - FUNCTIONAL ASSESSMENT: PAIN_FUNCTIONAL_ASSESSMENT: 0-10

## 2024-03-21 ASSESSMENT — PAIN DESCRIPTION - DESCRIPTORS: DESCRIPTORS: SORE

## 2024-03-21 ASSESSMENT — PAIN SCALES - GENERAL: PAINLEVEL_OUTOF10: 5 - MODERATE PAIN

## 2024-03-21 NOTE — PROGRESS NOTES
Physical Therapy Treatment    Patient Name: Pauline Orta  MRN: [id number]  Today's Date: 3/21/2024  Time Calculation  Start Time: 1454  Stop Time: 1544  Time Calculation (min): 50 min  PT Therapeutic Procedures Time Entry  Therapeutic Exercise Time Entry: 30  Therapeutic Activity Time Entry: 20    Insurance:  Visit number: 12 of 13  Authorization info: 13 Visits 12/19-3/18  Insurance Type: Payor: AET MEDICARE / Plan: [employer] MEDICARE ASSURE / Product Type: *No Product type* /     Current Problem   1. Status post reverse total shoulder replacement, right  Follow Up In Physical Therapy          Subjective   General   Reason for Referral: R RTS 11/3/23  Referred By: Dr. Castillo  General Comment: Pt reports she went to see MD who stated that there isn't anything wrong with the post-op joint. She was told that the arm and shoulder wouldn't be the same again.  Precautions:  Precautions  Precautions Comment: s/p R RTS  Pain   Pain Assessment: 0-10  Pain Score: 5 - Moderate pain  Pain Descriptors: Sore  Post Treatment Pain Level 5    Objective   Scapular compensation however improved mobility with less reports spasms  L GH AROM:  Flexion - 105  Abduction - 90  ER - 48  IR  - WNL     L GH MMT:   Flexion - 5  Abd - 4+  ER - 4  IR - 4+    UEFs -     Treatments:  Therapeutic Exercise:  Therapeutic Exercise  Therapeutic Exercise Performed: Yes  Therapeutic Exercise Activity 1: UEB 8'  Therapeutic Exercise Activity 2: slide board flexion L4 #2 10x3  Therapeutic Exercise Activity 3: standing flexion to 90 no weight 10x3  Therapeutic Exercise Activity 4: standing ER no resistance 10x3  Therapeutic activity:  Therapeutic Activity  Therapeutic Activity Performed: Yes  Therapeutic Activity 1: Discussed post-op healing and expected ROM/recovery with post-op status      Assessment   Assessment:   PT Assessment  Assessment Comment: Pt has progress slowly during therapy, near functional ROM however strengthening has just started  within against gravity position due to limitations with high pain prior. Pt did go to MD how would like her to continue with post-op strengthening. Pt would benefit from further skilled physical therapy as there is still high functional deficits with self care and driving tasks, without therapy pt is at further risk of functional decline and independence.    Plan:    Requesting more visits    OP EDUCATION:   Updated HEP with standing flextion, ER and abd    Goals:   Active       Mobility       Goal 1 (Progressing)       Start:  12/12/23    Expected End:  03/11/24       Pt will improve right sh AROM to WNL To improve I/ADLs.          Goal 2 (Progressing)       Start:  12/12/23    Expected End:  03/11/24       Pt will improve right sh strength to 5/5 to improve I/ADLs.            Pain       Goal 1 (Progressing)       Start:  12/12/23    Expected End:  03/11/24       Pt will perform all housework with 0/10 pain.         Goal 2 (Progressing)       Start:  12/12/23    Expected End:  03/11/24       Pt will perform all dressing activities with 0/10 pain.

## 2024-03-22 RX ORDER — SERTRALINE HYDROCHLORIDE 50 MG/1
50 TABLET, FILM COATED ORAL DAILY
Qty: 90 TABLET | Refills: 1 | Status: SHIPPED | OUTPATIENT
Start: 2024-03-22

## 2024-03-27 ENCOUNTER — OFFICE VISIT (OUTPATIENT)
Dept: ORTHOPEDIC SURGERY | Facility: CLINIC | Age: 74
End: 2024-03-27
Payer: MEDICARE

## 2024-03-27 DIAGNOSIS — G89.29 BILATERAL CHRONIC KNEE PAIN: ICD-10-CM

## 2024-03-27 DIAGNOSIS — M25.562 BILATERAL CHRONIC KNEE PAIN: ICD-10-CM

## 2024-03-27 DIAGNOSIS — M17.0 PRIMARY OSTEOARTHRITIS OF BOTH KNEES: Primary | ICD-10-CM

## 2024-03-27 DIAGNOSIS — M25.561 BILATERAL CHRONIC KNEE PAIN: ICD-10-CM

## 2024-03-27 PROCEDURE — 1160F RVW MEDS BY RX/DR IN RCRD: CPT | Performed by: FAMILY MEDICINE

## 2024-03-27 PROCEDURE — 1159F MED LIST DOCD IN RCRD: CPT | Performed by: FAMILY MEDICINE

## 2024-03-27 PROCEDURE — 99204 OFFICE O/P NEW MOD 45 MIN: CPT | Performed by: FAMILY MEDICINE

## 2024-03-27 PROCEDURE — 20610 DRAIN/INJ JOINT/BURSA W/O US: CPT | Performed by: FAMILY MEDICINE

## 2024-03-27 PROCEDURE — 1036F TOBACCO NON-USER: CPT | Performed by: FAMILY MEDICINE

## 2024-03-27 RX ORDER — LIDOCAINE HYDROCHLORIDE 10 MG/ML
5 INJECTION INFILTRATION; PERINEURAL
Status: COMPLETED | OUTPATIENT
Start: 2024-03-27 | End: 2024-03-27

## 2024-03-27 RX ORDER — TRIAMCINOLONE ACETONIDE 40 MG/ML
40 INJECTION, SUSPENSION INTRA-ARTICULAR; INTRAMUSCULAR
Status: COMPLETED | OUTPATIENT
Start: 2024-03-27 | End: 2024-03-27

## 2024-03-27 RX ADMIN — LIDOCAINE HYDROCHLORIDE 5 ML: 10 INJECTION INFILTRATION; PERINEURAL at 14:05

## 2024-03-27 RX ADMIN — TRIAMCINOLONE ACETONIDE 40 MG: 40 INJECTION, SUSPENSION INTRA-ARTICULAR; INTRAMUSCULAR at 14:05

## 2024-03-27 NOTE — PROGRESS NOTES
** Please excuse any errors in grammar or translation related to this dictation. Voice recognition software was utilized to prepare this document. **    Assessment & Plan:  Clinical presentation is most consistent with exacerbation of bilateral knee arthritis.   Discuss with patient the nature of this disease and how it can be progressive.  Discuss non-operative treatment options to include maintaining a healthy weight, rehab program to improve quad & hamstring strength, activity modifications as needed, prescription and otc analgesics, corticosteroid injection, viscosupplementation injection.  Given current symptoms and failure to respond to other treatments to date, patient elects for treatment with CSI.  Knee conditioning program also provided to complete 2-3x/week.   Informed patient that steroid injection can be repeated every 3 or more months as symptoms dictate. For intermittent symptoms can utilize over-the-counter acetaminophen, Voltaren gel, ice pack or heating pad. Follow-up in 3 months or as needed. All questions answered and patient is agreeable to this plan.       Chief complaint:  Knee pain    HPI:  72 y/o F presents with left knee pain.  This complaint has been ongoing for at least 5+ months.  No mechanism of injury reported at onset. Symptoms have progressively worsened with time.  Pain is most prominent at medial aspect of knees and more pronounced on left side. Reports feeling of instability.  Symptoms are aggravated by stairs, walking distance. To date, patient has tried a variety of treatments to include gabapentin, tylenol, voltaren gel with little sustained effect. Denies previous surgery to this site.       Patient Active Problem List   Diagnosis    Anxiety    Arthritis of right glenohumeral joint    Chronic pain    Osteoarthritis    Gastroesophageal reflux disease    Arthritis of right shoulder region    Status post total shoulder arthroplasty, right    Cervical spondylosis    Foraminal  stenosis of cervical region     Past Surgical History:   Procedure Laterality Date    CARPAL TUNNEL RELEASE Right     CATARACT EXTRACTION      CERVICAL FUSION      CERVICAL FUSION      GASTRIC BYPASS      HYSTERECTOMY      partial    JOINT REPLACEMENT      LAMINECTOMY      MR HEAD ANGIO WO IV CONTRAST  10/14/2019    MR HEAD ANGIO WO IV CONTRAST [facility]    NECK SURGERY      ORTHOPEDIC SURGERY      SPINAL FUSION      TRIGGER FINGER RELEASE Right      Current Outpatient Medications on File Prior to Visit   Medication Sig Dispense Refill    ascorbic acid (Vitamin C) 500 mg tablet Take 1 tablet (500 mg) by mouth once daily.      calcium citrate-vitamin D2 250 mg-2.5 mcg (100 unit) tablet Take 1 tablet by mouth once daily.      cholecalciferol (Vitamin D-3) 125 MCG (5000 UT) capsule Take 1 capsule (125 mcg) by mouth once daily.      cyanocobalamin (Vitamin B-12) 250 mcg tablet Take 1 tablet (250 mcg) by mouth once daily.      cyclobenzaprine (Flexeril) 5 mg tablet Take 1 tablet (5 mg) by mouth 3 times a day as needed.      diclofenac sodium (Voltaren) 1 % gel gel Apply 4.5 inches (4 g) topically 4 times a day as needed.      DULoxetine (Cymbalta) 30 mg DR capsule Take 1 capsule (30 mg) by mouth once daily. DO NOT CRUSH OR CHEW 30 capsule 1    estradiol (Estrace) 0.01 % (0.1 mg/gram) vaginal cream INSERT 1 GRAM VAGINALLY 2 TIMES A WEEK      gabapentin (Neurontin) 800 mg tablet Take 1 tablet (800 mg) by mouth 3 times a day. 90 tablet 3    multivitamin tablet Take 1 tablet by mouth once daily.      naratriptan (Amerge) 2.5 mg tablet Take 1 tablet (2.5 mg) by mouth 1 time if needed for headaches.  ONSET OF HEADACHE. MAY REPEAT DOSE AFTER 4 HOURS IF DOES NOT RESOLVE. DO NOT EXCEED 5MG IN 24 HRS      omega 3-dha-epa-fish oil (Fish OiL) 1,000 mg (120 mg-180 mg) capsule Take 1 capsule (1,000 mg) by mouth once daily.      omeprazole OTC (PriLOSEC OTC) 20 mg EC tablet Take 1 tablet (20 mg) by mouth 2 times a day.       polyethylene glycol (Glycolax, Miralax) 17 gram packet Take 17 g by mouth once daily.      Restasis 0.05 % ophthalmic emulsion Administer 1 drop into both eyes 2 times a day as needed (dry eyes).      sertraline (Zoloft) 50 mg tablet TAKE 1 TABLET BY MOUTH EVERY DAY 90 tablet 1    tiZANidine (Zanaflex) 4 mg tablet Take 1 tablet (4 mg) by mouth every 8 hours if needed for muscle spasms. 90 tablet 2    topiramate (Topamax) 25 mg tablet       TUMERIC-GING-OLIVE-OREG-CAPRYL ORAL Take 1 mg by mouth once daily.      [DISCONTINUED] sertraline (Zoloft) 50 mg tablet Take 1 tablet (50 mg) by mouth once daily. 90 tablet 1     No current facility-administered medications on file prior to visit.         Exam:  RIGHT Knee examined. No effusion, ecchymosis, or erythema.  AROM from 0 to 120 deg with 5/5 strength. SILT overlying knee. Motion crepitus present. Tenderness along medial joint line.  No popliteal mass palpated. Negative anterior and posterior drawer.  No laxity to varus or valgus stress at 0 or 30 deg.  No patellar apprehension.    LEFT Knee examined. No effusion, ecchymosis, or erythema.  AROM from 0 to 120 deg with 5/5 strength. SILT overlying knee. Motion crepitus present. Tenderness along medial joint line.  No popliteal mass palpated. Negative anterior and posterior drawer.  No laxity to varus or valgus stress at 0 or 30 deg.  No patellar apprehension.        General Exam:  Constitutional - NAD, AAO x 3, conversing appropriately.  HEENT- Normocephalic and atraumatic. EOMI, PERRLA, No scleral icterus. No facial deformities. Hearing grossly normal.  Lungs - Breathing non-labored with normal rate. No accessory muscle use.  CV - Extremities warm and well-perfused, brisk capillary refill present.   Neuro - CN II-XII grossly intact.    Results:  X-ray bilateral knees obtained 8/12/2023 independently interpreted as mild tricompartmental degenerative changes.  Normal alignment.    Lab Results   Component Value Date     HGBA1C 5.5 03/31/2023    CREATININE 0.80 11/04/2023    EGFR 77 11/04/2023      Procedure:  Patient ID: Pauline Orta is a 73 y.o. female.    L Inj/Asp: bilateral knee on 3/27/2024 2:05 PM  Indications: pain  Details: 25 G needle, anteromedial approach  Medications (Right): 40 mg triamcinolone acetonide 40 mg/mL; 5 mL lidocaine 10 mg/mL (1 %)  Medications (Left): 40 mg triamcinolone acetonide 40 mg/mL; 5 mL lidocaine 10 mg/mL (1 %)  Outcome: tolerated well, no immediate complications    Procedure risk factors to include increased pain, bleeding, infection, neurovascular injury, soft tissue injury, transient elevation of blood glucose and blood pressure, and adverse reaction to medication were discussed with the patient. Patient understands there is a moderate risk of morbidity from undergoing the procedure.    Procedure, treatment alternatives, risks and benefits explained, specific risks discussed. Consent was given by the patient. Immediately prior to procedure a time out was called to verify the correct patient, procedure, equipment, support staff and site/side marked as required. Patient was prepped and draped in the usual sterile fashion.

## 2024-03-29 ENCOUNTER — APPOINTMENT (OUTPATIENT)
Dept: ORTHOPEDIC SURGERY | Facility: CLINIC | Age: 74
End: 2024-03-29
Payer: MEDICARE

## 2024-03-31 NOTE — PROGRESS NOTES
Subjective    Patient ID: Pauline Orta is a 73 y.o. female.    Chief Complaint: No chief complaint on file.     Last Surgery: No surgery found  Last Surgery Date: No surgery found    HPI  Pauline is a 73 y.o. right hand dominant female presenting today for evaluation of their right shoulder. This is a second opinion visit.      She explains that she had a right reverse done 5 months ago in November 2023 with Dr. Castillo. She was allergic to the surgical tape but had no other post operative complications. Initially she improved, but as she did PT she had increasing pain. She saw Dr. Herrera two weeks ago who found inflammatory markers. Her pain is mainly with external rotation, pushing and lifting.    Her left shoulder is not bothersome today, but she does note she has arthritis in most of her joints.     Past medical history, surgical history, social history, and family history were all reviewed and are as per the [facility] patient health history questionnaire form that I signed and scanned into the chart today.        Objective   Ortho Exam  Patient is a well-developed, well-nourished female in no acute distress.  Breathes with normal chest rises.  Pupils are round and symmetric today.  Awake, alert, and oriented x3.      Examination of the left shoulder today reveals the skin to be intact. There is no sign of any atrophy, lesions, or abrasions. There is no pain to palpation of the bony prominences. Cervical lymphadenopathy examined, and this was negative. Patient had 5 out of 5 wrist flexion, extension, and thumb extension bilaterally. Sensation was intact to light touch to median, ulnar, radial axillary, and musculocutaneous nerves bilaterally. Positive radial pulse bilaterally. Provocative maneuvers on the left side today were negative.  Range of motion of the left shoulder revealed 0-170° of forward elevation, 0-60° of external rotation, and internal rotation was to T-12.     Examination of the right  shoulder today reveals the skin to be intact. There is no sign of any atrophy, lesions, or abrasions. TPain to palpation along the scapula, posterior glenohumeral joint. Cervical lymphadenopathy examined, and this was negative. Patient had 5 out of 5 wrist flexion, extension, and thumb extension, bilaterally. Sensation was intact to light touch to median, ulnar, radial, axillary, and musculocutaneous nerves bilaterally. Positive radial pulse bilaterally. Provocative maneuvers on the right side today were negative.  Range of motion of the right shoulder revealed ° of forward elevation, 0-30° of external rotation, and internal rotation up to her buttocks. 3+/5 strength        Image Results:  XR shoulder right 2+ views  Narrative: Interpreted By:  Nick Greer,   STUDY:  XR SHOULDER RIGHT 2+ VIEWS      INDICATION:  Signs/Symptoms:right shoulder pain.      COMPARISON:  November 3, 2023      ACCESSION NUMBER(S):  [id number]      ORDERING CLINICIAN:  LORENA JEFFRIES      FINDINGS:  Reverse right total shoulder arthroplasty without fracture,  malalignment, or periprosthetic lucency.      Impression: Satisfactory appearance reverse right shoulder arthroplasty.      Signed by: Nick Greer 3/14/2024 7:15 PM  Dictation workstation:   [id number]      Assessment/Plan   Encounter Diagnoses:  No diagnosis found.  Patient is 4 months s/p reverse done by a very good surgeon. Now having different pain after surgery    We had a long discussion regarding her options today. She was making good progress in physical therapy but has since plateau ed. There is no sign of infection or stress fractures. I advised that we give this more time to finish healing. If we give it more time and it seems that her shoulder is getting worse or not improving at all we can consider an arthroscopic clean up and cultures of her shoulder. She may stop physical therapy as it may be making her worse. I want her to continue to do our at-home therapy  daily. She may use her arm as tolerated. We will see her back in 6 weeks to assess how she is doing. If she is not getting better or is worse, arthroscopic surgery would be the next step.     No orders of the defined types were placed in this encounter.    Follow up in 6 weeks    Scribe Attestation  By signing my name below, Daria FINLEY , Klarissa   attest that this documentation has been prepared under the direction and in the presence of Emilio Simpson MD.

## 2024-04-01 ENCOUNTER — TREATMENT (OUTPATIENT)
Dept: PHYSICAL THERAPY | Facility: CLINIC | Age: 74
End: 2024-04-01
Payer: COMMERCIAL

## 2024-04-01 DIAGNOSIS — Z96.611 STATUS POST REVERSE TOTAL SHOULDER REPLACEMENT, RIGHT: ICD-10-CM

## 2024-04-01 PROCEDURE — 97110 THERAPEUTIC EXERCISES: CPT | Mod: GP | Performed by: PHYSICAL THERAPIST

## 2024-04-01 ASSESSMENT — PAIN SCALES - GENERAL: PAINLEVEL_OUTOF10: 6

## 2024-04-01 ASSESSMENT — PAIN - FUNCTIONAL ASSESSMENT: PAIN_FUNCTIONAL_ASSESSMENT: 0-10

## 2024-04-01 NOTE — PROGRESS NOTES
Physical Therapy Treatment    Patient Name: Pauline Orta  MRN: [id number]  Today's Date: 4/1/2024  Time Calculation  Start Time: 1525  Stop Time: 1610  Time Calculation (min): 45 min  PT Therapeutic Procedures Time Entry  Therapeutic Exercise Time Entry: 45    Insurance:  Visit number: 13 of 20  Authorization info: [place] visits/No Auth starting 1/1/2024  Insurance Type: Payor: [employer] MEDICARE / Plan: [employer] MEDICARE ASSURE / Product Type: *No Product type* /     Current Problem   1. Status post reverse total shoulder replacement, right  Follow Up In Physical Therapy    Follow Up In Physical Therapy          Subjective   General   Reason for Referral: R RTS 11/3/23  Referred By: Dr. Castillo  General Comment: Pt reports she went back to see surgical MD, who has referred her to another surgeon Dr. Simpson who she sees tomorrow. She reports that he put her arm back in the sling after she left and was told to continue with therapy.  Precautions:  Precautions  Precautions Comment: s/p R RTS  Pain   Pain Assessment: 0-10  Pain Score: 6  Post Treatment Pain Level 6    Objective   GH ROM:  R fleixon 115  R abd 84  R ER 10  R IR WNL neutral    GH MMT:  Note performed in neutral/isometric position   R flexion 4+  Abd 4  ER and IR 3+    Treatments:  Therapeutic Exercise:  Therapeutic Exercise  Therapeutic Exercise Performed: Yes  Therapeutic Exercise Activity 1: UEB 4'  Therapeutic Exercise Activity 2: isometric walk out IR/ER OTB 10x3 (unable to perform IR stopped after 10)  Therapeutic Exercise Activity 3: slide board flexion L4 #2 10x3  Therapeutic Exercise Activity 4: SA row OTB 10x3  Therapeutic Exercise Activity 5: yellow SB roll out 10x2    Assessment   Assessment:   PT Assessment  Assessment Comment: Pt continues to be challenged with attempted progression of strengthening performed this date, often reporting grabbing type pain. Pt has been referred to another orthopedic surgeon for another opinion, will wait  results from MD prior to fully discussing progression, pt verbalized understanding and agreement.    Plan:    Discuss MD report next session and decide on POC moving forward    OP EDUCATION:   Educated on limitations that have occurred     Goals:   Active       Mobility       Goal 1 (Progressing)       Start:  12/12/23    Expected End:  03/11/24       Pt will improve right sh AROM to WNL To improve I/ADLs.          Goal 2 (Progressing)       Start:  12/12/23    Expected End:  03/11/24       Pt will improve right sh strength to 5/5 to improve I/ADLs.            Pain       Goal 1 (Progressing)       Start:  12/12/23    Expected End:  03/11/24       Pt will perform all housework with 0/10 pain.         Goal 2 (Progressing)       Start:  12/12/23    Expected End:  03/11/24       Pt will perform all dressing activities with 0/10 pain.

## 2024-04-02 ENCOUNTER — OFFICE VISIT (OUTPATIENT)
Dept: ORTHOPEDIC SURGERY | Facility: HOSPITAL | Age: 74
End: 2024-04-02
Payer: MEDICARE

## 2024-04-02 DIAGNOSIS — G89.29 CHRONIC RIGHT SHOULDER PAIN: Primary | ICD-10-CM

## 2024-04-02 DIAGNOSIS — M25.511 CHRONIC RIGHT SHOULDER PAIN: Primary | ICD-10-CM

## 2024-04-02 PROCEDURE — 1160F RVW MEDS BY RX/DR IN RCRD: CPT | Performed by: ORTHOPAEDIC SURGERY

## 2024-04-02 PROCEDURE — 99214 OFFICE O/P EST MOD 30 MIN: CPT | Performed by: ORTHOPAEDIC SURGERY

## 2024-04-02 PROCEDURE — 1159F MED LIST DOCD IN RCRD: CPT | Performed by: ORTHOPAEDIC SURGERY

## 2024-04-03 ENCOUNTER — OFFICE VISIT (OUTPATIENT)
Dept: PRIMARY CARE | Facility: CLINIC | Age: 74
End: 2024-04-03
Payer: MEDICARE

## 2024-04-03 VITALS
SYSTOLIC BLOOD PRESSURE: 120 MMHG | OXYGEN SATURATION: 95 % | HEIGHT: 63 IN | WEIGHT: 147 LBS | HEART RATE: 84 BPM | BODY MASS INDEX: 26.05 KG/M2 | DIASTOLIC BLOOD PRESSURE: 66 MMHG | TEMPERATURE: 96.9 F

## 2024-04-03 DIAGNOSIS — R73.09 ELEVATED HEMOGLOBIN A1C: ICD-10-CM

## 2024-04-03 DIAGNOSIS — Z23 ENCOUNTER FOR IMMUNIZATION: ICD-10-CM

## 2024-04-03 DIAGNOSIS — Z11.59 NEED FOR HEPATITIS C SCREENING TEST: ICD-10-CM

## 2024-04-03 DIAGNOSIS — Z12.31 BREAST CANCER SCREENING BY MAMMOGRAM: ICD-10-CM

## 2024-04-03 DIAGNOSIS — Z96.611 STATUS POST TOTAL SHOULDER ARTHROPLASTY, RIGHT: ICD-10-CM

## 2024-04-03 DIAGNOSIS — F41.9 ANXIETY: ICD-10-CM

## 2024-04-03 DIAGNOSIS — Z00.00 ROUTINE ADULT HEALTH MAINTENANCE: Primary | ICD-10-CM

## 2024-04-03 DIAGNOSIS — R63.4 UNINTENDED WEIGHT LOSS: ICD-10-CM

## 2024-04-03 DIAGNOSIS — Z81.8 FAMILY HISTORY OF DEMENTIA: ICD-10-CM

## 2024-04-03 DIAGNOSIS — Z13.6 SCREENING FOR CARDIOVASCULAR CONDITION: ICD-10-CM

## 2024-04-03 PROBLEM — K58.9 IRRITABLE BOWEL SYNDROME: Status: ACTIVE | Noted: 2024-04-03

## 2024-04-03 PROBLEM — G43.909 MIGRAINE HEADACHE: Status: ACTIVE | Noted: 2023-08-12

## 2024-04-03 PROBLEM — M85.80 OSTEOPENIA, SENILE: Status: ACTIVE | Noted: 2019-05-09

## 2024-04-03 PROBLEM — G47.33 OSA (OBSTRUCTIVE SLEEP APNEA): Status: ACTIVE | Noted: 2020-04-29

## 2024-04-03 PROCEDURE — 1125F AMNT PAIN NOTED PAIN PRSNT: CPT | Performed by: FAMILY MEDICINE

## 2024-04-03 PROCEDURE — G0439 PPPS, SUBSEQ VISIT: HCPCS | Performed by: FAMILY MEDICINE

## 2024-04-03 PROCEDURE — 1159F MED LIST DOCD IN RCRD: CPT | Performed by: FAMILY MEDICINE

## 2024-04-03 PROCEDURE — 1036F TOBACCO NON-USER: CPT | Performed by: FAMILY MEDICINE

## 2024-04-03 PROCEDURE — 1160F RVW MEDS BY RX/DR IN RCRD: CPT | Performed by: FAMILY MEDICINE

## 2024-04-03 PROCEDURE — 1123F ACP DISCUSS/DSCN MKR DOCD: CPT | Performed by: FAMILY MEDICINE

## 2024-04-03 PROCEDURE — 1158F ADVNC CARE PLAN TLK DOCD: CPT | Performed by: FAMILY MEDICINE

## 2024-04-03 PROCEDURE — 99215 OFFICE O/P EST HI 40 MIN: CPT | Performed by: FAMILY MEDICINE

## 2024-04-03 PROCEDURE — 90677 PCV20 VACCINE IM: CPT | Performed by: FAMILY MEDICINE

## 2024-04-03 ASSESSMENT — PAIN SCALES - GENERAL: PAINLEVEL: 7

## 2024-04-03 NOTE — PATIENT INSTRUCTIONS
Problem List Items Addressed This Visit             ICD-10-CM    Anxiety F41.9    Status post total shoulder arthroplasty, right Z96.611    Unintended weight loss R63.4     - Will complete panel blood work to screen for any contributing metabolic abnormalities  -Continue to focus on healthy, balanced diet         Relevant Orders    Comprehensive metabolic panel    Tsh With Reflex To Free T4 If Abnormal    Family history of dementia Z81.8     - Will continue with neurology workup as scheduled with plans to investigate any medication modifications if warranted going forward          Other Visit Diagnoses         Codes    Routine adult health maintenance    -  Primary Z00.00    Relevant Orders    Comprehensive metabolic panel    Lipid panel    Hepatitis C antibody    BI mammo bilateral screening tomosynthesis    Pneumococcal conjugate vaccine, 20-valent (PREVNAR 20)    Screening for cardiovascular condition     Z13.6    Relevant Orders    Comprehensive metabolic panel    Lipid panel    Elevated hemoglobin A1c     R73.09    Relevant Orders    Hemoglobin A1c    Need for hepatitis C screening test     Z11.59    Relevant Orders    Hepatitis C antibody    Breast cancer screening by mammogram     Z12.31    Relevant Orders    BI mammo bilateral screening tomosynthesis    Encounter for immunization     Z23    Relevant Orders    Pneumococcal conjugate vaccine, 20-valent (PREVNAR 20)            Additional Visit Plans:  Notes:  PREVENTATIVE HEALTH SCREENINGS INCLUDED:  - Blood pressure screen.  - Blood work may include a cholesterol and diabetes screen if risk factors exist (overweight, high blood pressure etc); screening for sexually transmitted infections; a one time HIV screen for all individuals, and a one time Hepatitis C Virus screen for those born between 6189-2170.  - I encourage you to eat a low-fat, moderate-carbohydrate, low-calorie diet to maintain a normal BMI (under 25) to reduce heart disease, and risk for  diabetes  - Moderate intensity exercise for 30 minutes 5 days per week is recommended  - Along with recommendations for nutrition and exercise discussed today helpful resources recommended by the American Academy of Family Practice can be found at www.familydoctor.org or www.choosemyplate.gov    - Colon cancer screening for all ages 45-75 or 85 years old periodically depending on results.  - Cervical cancer screening (pap test) in women between 21-65 years old, periodically depending on results.  - Mammogram screening for breast cancer in women starting at 40-50 years and every 1-2 years.  - For men and women who have a 30 pack year smoking history and currently smoke or have quit in the past 15 years, screening for lung cancer with a yearly low dose CT scan is recommended starting at age 55 until age 80 years  - For men only who have smoked 100+ cigarettes anytime in their lifetime, a one time ultrasound screening for for abdominal aortic aneurysms starting at age 65 until 75 years old  - Bone density screening (DEXA) for osteoporosis in women aged 65 years and older, once every two years if needed.    IMMUNIZATIONS:  - Flu shot annually.  - Tetanus booster every 10 years.  - Two pneumococcal vaccinations after 65 years old.  - Shingles vaccine for those 50 years or older.     This was a shared decision making visit.      Counseling:       Medication education:         Education:  The patient is counseled regarding potential side-effects of all new medications        Understanding:  Patient expressed understanding        Adherence:  No barriers to adherence identified

## 2024-04-03 NOTE — PROGRESS NOTES
Outpatient Visit Note    Chief Complaint   Patient presents with    Medicare Annual Wellness Visit Subsequent     Here for annual wellness exam - concerned about her medications effecting her memory. Concerns about losing weight when not trying       HPI:  Pauline Orta is a 73 y.o. female who presents to the office for an annual Medicare Wellness Visit.    who presents to the she was last seen in the office on 12/8/2023 for follow-up and paperwork to coordinate home health aide    In review, patient had been most recently established at [facility], having last seen her primary care provider. At initial encounter she reported a past medical history significant for prior gastric bypass and osteoarthritis with chronic pain.     In interval, she did undergo right shoulder replacement via orthopedics on 11/3/2023.  She has separately had history of chronic pain issues to which she was given pain management referral.  Did have initial encounter scheduled for 9/18/2023 with Dr. Hogue, though she had to cancel as patient came down with COVID-19.    Postoperatively, she has been receiving home health care with physical and Occupational Therapy.  At last encounter she reported moderate delays in her therapy starting with home teams finally starting treatments.  Denies any shortness of breath or difficulty breathing. She has a history of gastric bypass which leaves her unable to take oral anti-inflammatory medication.    Has had follow-up with orthopedics in interval.  Did have panel of blood work completed in January/February including assessment of rheumatoid factor, DORA and inflammatory markers, all of which were unremarkable    Well Exam:  Overall, they describes their health as good with no reports of recent illness or hospitalization.  She did recently contact office asking to establish with new neurologist secondary to history of migraines.  Did have recent consultation with Dr. Miller.  During that visit  she did express concerns regarding cognition and prominent history of Alzheimer's/dementia and family.  He did express concerns regarding her current medication regimens and have her set up to have formal consultation with subspecialist.  Evaluation is scheduled for later this week.      They state that their diet is stable though she has had unintended weight loss, noting approximately 7 pound weight loss since August. In regards to physical activity, she does attempt to stay physically active though she has been less active with her orthopedic issues and chronic pain. They deny any significant sleep complaints. No reported issues of chest pain, shortness of breath, headaches, vision/hearing changes, abdominal pain, vomiting, diarrhea, melena, hematochezia, constipation or urinary symptoms.    Preventative Health Maintenance:  In regards to preventative health maintenance, last Tdap received in May 2013. Flu shot received for past season. Pneumonia vaccination series previously initiated with Prevnar 13 and Pneumovax. In regards to CRC screening, colonoscopy successfully completed in 2022. Shingles vaccination series completed. COVID-19 vaccination series completed including booster.  RST vaccine received. Last Mammogram completed in July 2022. DEXA scan last completed in April 2023.      Advanced directives: Living will, power of     Hearing screen: reports no difficulty with hearing and passes finger rub test bilaterally    Does the patient use opioid medications: No  Name of medication: N/A  If yes, do they take this medicine appropriately: N/A    How does the patient rate their health status today: good    Cognitive Screen:  AAAx3  to person, place and time: Yes  3 word recall: Apple, Car, Shoe - Immediate recall: Yes         - 5 minutes recall: Yes   Impression: No cognitive deficiency observed during screening or encounter today      Reviewed:   Past Medical History/Allergies:  Yes  Family History:   Yes  Social History:  Yes  Current Medications:  Yes  Vital Signs:  Yes  Advanced Directives:  discussed  Immunizations:  reviewed today  Home Safety:                    Up & Go test > 30 seconds?  No                   Home have rugs; lack grab bars in bathroom; lack handrail on stairs; have poor lighting?  No                   Hearing difficulties?  No  Geriatric Assessment                   ADL areas requiring assistance:  Does not need help with Dressing, Eating, Ambulating, Toileting, Grooming, Hygiene.                    IADL areas requiring assistance:  Does not need help with Shopping, Housework, Accounting, Transportation, Driving.   Medications: reviewed  Current supplements:  Reviewed and recorded.   Other providers: Reviewed and recorded - Current providers and suppliers: Dr. Alexandre; Dr. Musa -orthopedics; neurology      Past Medical History:   Diagnosis Date    Adverse effect of anesthesia     woke during anesthesia    Anxiety     Arthritis     Cervical disc disease     Chronic pain disorder     Fibromyalgia, primary     GERD (gastroesophageal reflux disease)     Joint pain     Low back pain     Lumbosacral disc disease     Migraine     Neck pain     Osteoarthritis     Peripheral neuropathy     Spinal stenosis         Current Medications  Current Outpatient Medications   Medication Instructions    ascorbic acid (VITAMIN C) 500 mg, oral, Daily    calcium citrate-vitamin D2 250 mg-2.5 mcg (100 unit) tablet 1 tablet, oral, Daily    cholecalciferol (VITAMIN D-3) 125 mcg, oral, Daily    cyanocobalamin (VITAMIN B-12) 250 mcg, oral, Daily    cyclobenzaprine (FLEXERIL) 5 mg, oral, 3 times daily PRN    diclofenac sodium (VOLTAREN) 4 g, Topical, 4 times daily PRN    DULoxetine (CYMBALTA) 30 mg, oral, Daily, DO NOT CRUSH OR CHEW    estradiol (Estrace) 0.01 % (0.1 mg/gram) vaginal cream INSERT 1 GRAM VAGINALLY 2 TIMES A WEEK<BR>    gabapentin (NEURONTIN) 800 mg, oral, 3 times daily    multivitamin tablet 1  tablet, oral, Daily    naratriptan (AMERGE) 2.5 mg, oral, Once as needed,  ONSET OF HEADACHE. MAY REPEAT DOSE AFTER 4 HOURS IF DOES NOT RESOLVE. DO NOT EXCEED 5MG IN 24 HRS<BR>    omega 3-dha-epa-fish oil (Fish OiL) 1,000 mg (120 mg-180 mg) capsule 1,000 mg, oral, Daily    omeprazole OTC (PRILOSEC OTC) 20 mg, oral, 2 times daily    polyethylene glycol (GLYCOLAX, MIRALAX) 17 g, oral, Daily    Restasis 0.05 % ophthalmic emulsion 1 drop, Both Eyes, 2 times daily PRN    sertraline (ZOLOFT) 50 mg, oral, Daily    topiramate (Topamax) 25 mg tablet     TUMERIC-GING-OLIVE-OREG-CAPRYL ORAL 1 mg, oral, Daily        Allergies  Allergies   Allergen Reactions    Aspirin Other     hx gastric bypass    Iodinated Contrast Media Hives     Patient states she gets benadryl prior to any scans using IV contrast    Lithium Unknown     Muscle twitch occurred over 30 years ago    Shellfish Derived Angioedema     35 years ago    Adhesive Other     Redness - Irritation    Caffeine Insomnia        Immunizations  Immunization History   Administered Date(s) Administered    Flu vaccine, quadrivalent, high-dose, preservative free, age 65y+ (FLUZONE) 09/17/2020, 07/26/2022, 09/07/2023    Influenza, High Dose Seasonal, Preservative Free 09/09/2014, 09/24/2015, 09/19/2017, 09/25/2019    Influenza, Injectable, MDCK, preservative free 09/27/2017    Influenza, Seasonal, Quadrivalent, Adjuvanted 09/15/2021, 07/26/2022    Influenza, Unspecified 09/07/2013    Influenza, seasonal, injectable 09/17/2013, 09/13/2016    Novel influenza-H1N1-09, preservative-free 11/15/2009    Pfizer COVID-19 vaccine, Fall 2023, 12 years and older, (30mcg/0.3mL) 10/19/2023    Pfizer COVID-19 vaccine, bivalent, age 12 years and older (30 mcg/0.3 mL) 09/24/2022    Pfizer Gray Cap SARS-CoV-2 04/05/2022    Pfizer Purple Cap SARS-CoV-2 02/24/2021, 03/17/2021, 09/27/2021    Pneumococcal conjugate vaccine, 13-valent (PREVNAR 13) 02/05/2016    Pneumococcal polysaccharide vaccine,  23-valent, age 2 years and older (PNEUMOVAX 23) 2012, 2017    RESPIRATORY SYNCYTIAL VIRUS (RSV), ELIGIBLE PREGNANT PTS, 0.5 ML (ABRYSVO) 10/19/2023    Tdap vaccine, age 7 year and older (BOOSTRIX, ADACEL) 2013    Zoster vaccine, recombinant, adult (SHINGRIX) 2018, 10/15/2018        Past Surgical History:   Procedure Laterality Date    CARPAL TUNNEL RELEASE Right     CATARACT EXTRACTION      CERVICAL FUSION      CERVICAL FUSION      GASTRIC BYPASS      HYSTERECTOMY      partial    JOINT REPLACEMENT      LAMINECTOMY      MR HEAD ANGIO WO IV CONTRAST  10/14/2019    MR HEAD ANGIO WO IV CONTRAST [facility]    NECK SURGERY      ORTHOPEDIC SURGERY      SPINAL FUSION      TRIGGER FINGER RELEASE Right      Family History   Problem Relation Name Age of Onset    Diabetes Mother Racquel Gonzalesrjohana     Arthritis Mother Racquel Dinero     Osteoporosis Mother Racquel Dinero     Cancer Father Ahsan Gonzalesrn     Lung disease Father Ahsan Gonzalesrn     Heart disease Father Ahsan Dinero     Alcohol abuse Father Ahsan Dinero     Alcohol abuse Brother Cr Dinero     Alcohol abuse Sister Mary Jones     Diabetes Son Billy Orta     Drug abuse Brother Cr      Social History     Tobacco Use    Smoking status: Former     Packs/day: 0.25     Years: 10.00     Additional pack years: 0.00     Total pack years: 2.50     Types: Cigarettes     Quit date: 3/18/1990     Years since quittin.0    Smokeless tobacco: Never   Vaping Use    Vaping Use: Never used   Substance Use Topics    Alcohol use: Not Currently    Drug use: Never     Tobacco Use: Medium Risk (4/3/2024)    Patient History     Smoking Tobacco Use: Former     Smokeless Tobacco Use: Never     Passive Exposure: Not on file        ROS  All pertinent positive symptoms are included in the history of present illness.  All other systems have been reviewed and are negative and noncontributory to this patient's current  ailments.    VITAL SIGNS  Vitals:    04/03/24 1042   BP: 120/66   Pulse: 84   Temp: 36.1 °C (96.9 °F)   SpO2: 95%     Vitals:    04/03/24 1042   Weight: 66.7 kg (147 lb)      Body mass index is 26.04 kg/m².     PHYSICAL EXAM  GENERAL APPEARANCE: well nourished, well developed, looks like stated age, in no acute distress, not ill or tired appearing, conversing well.   HEENT: no trauma, normocephalic. PERRLA and EOMI with normal external exam. TM's intact with no injection or effusion, no signs of infection. Nares patent, turbinates pink without discharge. Pharynx pink with no exudates or lesions, no enlarged tonsils.   NECK: no nodes, supple without rigidity, no neck mass was observed, no thyromegaly or thyroid nodules.   HEART: regular rate and rhythm, S1 and S2 heard with no murmurs or skipped beats  LUNGS: clear to auscultation bilaterally with no wheezes, crackles or rales.   ABDOMEN: no organomegaly, soft, nontender, nondistended, no guarding/rebound/rigidity.   EXTREMITIES: moving all extremities equally with no edema or deformities outside of restricted range of motion in right upper extremity with active use of wrist/hand splint.   SKIN: normal skin color and pigmentation, normal skin turgor without rash, lesions, or nodules visualized.   NEUROLOGIC EXAM: CN II-XII grossly intact, normal gait, normal balance, 5/5 muscle strength, sensation grossly intact.   PSYCH: mood and affect appropriate; alert and oriented to time, place, person; no difficulty with speech or language.       Preventative Services reviewed with patient and copy printed for patient.    Assessment/Plan   Problem List Items Addressed This Visit             ICD-10-CM    Anxiety F41.9    Status post total shoulder arthroplasty, right Z96.611    Unintended weight loss R63.4     - Will complete panel blood work to screen for any contributing metabolic abnormalities  -Continue to focus on healthy, balanced diet         Relevant Orders     Comprehensive metabolic panel    Tsh With Reflex To Free T4 If Abnormal    Family history of dementia Z81.8     - Will continue with neurology workup as scheduled with plans to investigate any medication modifications if warranted going forward          Other Visit Diagnoses         Codes    Routine adult health maintenance    -  Primary Z00.00    Relevant Orders    Comprehensive metabolic panel    Lipid panel    Hepatitis C antibody    BI mammo bilateral screening tomosynthesis    Pneumococcal conjugate vaccine, 20-valent (PREVNAR 20)    Screening for cardiovascular condition     Z13.6    Relevant Orders    Comprehensive metabolic panel    Lipid panel    Elevated hemoglobin A1c     R73.09    Relevant Orders    Hemoglobin A1c    Need for hepatitis C screening test     Z11.59    Relevant Orders    Hepatitis C antibody    Breast cancer screening by mammogram     Z12.31    Relevant Orders    BI mammo bilateral screening tomosynthesis    Encounter for immunization     Z23    Relevant Orders    Pneumococcal conjugate vaccine, 20-valent (PREVNAR 20)            Additional Visit Plans:  Notes:  PREVENTATIVE HEALTH SCREENINGS INCLUDED:  - Blood pressure screen.  - Blood work may include a cholesterol and diabetes screen if risk factors exist (overweight, high blood pressure etc); screening for sexually transmitted infections; a one time HIV screen for all individuals, and a one time Hepatitis C Virus screen for those born between 5501-2468.  - I encourage you to eat a low-fat, moderate-carbohydrate, low-calorie diet to maintain a normal BMI (under 25) to reduce heart disease, and risk for diabetes  - Moderate intensity exercise for 30 minutes 5 days per week is recommended  - Along with recommendations for nutrition and exercise discussed today helpful resources recommended by the American Academy of Family Practice can be found at www.familydoctor.org or www.choosemyplate.gov    - Colon cancer screening for all ages 45-75  or 85 years old periodically depending on results.  - Cervical cancer screening (pap test) in women between 21-65 years old, periodically depending on results.  - Mammogram screening for breast cancer in women starting at 40-50 years and every 1-2 years.  - For men and women who have a 30 pack year smoking history and currently smoke or have quit in the past 15 years, screening for lung cancer with a yearly low dose CT scan is recommended starting at age 55 until age 80 years  - For men only who have smoked 100+ cigarettes anytime in their lifetime, a one time ultrasound screening for for abdominal aortic aneurysms starting at age 65 until 75 years old  - Bone density screening (DEXA) for osteoporosis in women aged 65 years and older, once every two years if needed.    IMMUNIZATIONS:  - Flu shot annually.  - Tetanus booster every 10 years.  - Two pneumococcal vaccinations after 65 years old.  - Shingles vaccine for those 50 years or older.     This was a shared decision making visit.    Next Wellness Exam Due  In 1 year from today    Counseling:       Medication education:         Education:  The patient is counseled regarding potential side-effects of all new medications        Understanding:  Patient expressed understanding        Adherence:  No barriers to adherence identified

## 2024-04-03 NOTE — ASSESSMENT & PLAN NOTE
- Will complete panel blood work to screen for any contributing metabolic abnormalities  -Continue to focus on healthy, balanced diet

## 2024-04-03 NOTE — ASSESSMENT & PLAN NOTE
- Will continue with neurology workup as scheduled with plans to investigate any medication modifications if warranted going forward

## 2024-04-10 ENCOUNTER — HOSPITAL ENCOUNTER (EMERGENCY)
Facility: HOSPITAL | Age: 74
Discharge: HOME | End: 2024-04-10
Attending: EMERGENCY MEDICINE
Payer: COMMERCIAL

## 2024-04-10 ENCOUNTER — APPOINTMENT (OUTPATIENT)
Dept: RADIOLOGY | Facility: HOSPITAL | Age: 74
End: 2024-04-10
Payer: COMMERCIAL

## 2024-04-10 VITALS
WEIGHT: 144 LBS | RESPIRATION RATE: 18 BRPM | HEART RATE: 86 BPM | DIASTOLIC BLOOD PRESSURE: 83 MMHG | SYSTOLIC BLOOD PRESSURE: 120 MMHG | TEMPERATURE: 98.4 F | HEIGHT: 63 IN | BODY MASS INDEX: 25.52 KG/M2 | OXYGEN SATURATION: 97 %

## 2024-04-10 DIAGNOSIS — S92.415A NONDISPLACED FRACTURE OF PROXIMAL PHALANX OF LEFT GREAT TOE, INITIAL ENCOUNTER FOR CLOSED FRACTURE: Primary | ICD-10-CM

## 2024-04-10 DIAGNOSIS — S20.211A CONTUSION OF RIB ON RIGHT SIDE, INITIAL ENCOUNTER: ICD-10-CM

## 2024-04-10 PROCEDURE — 29515 APPLICATION SHORT LEG SPLINT: CPT | Mod: LT | Performed by: EMERGENCY MEDICINE

## 2024-04-10 PROCEDURE — 71046 X-RAY EXAM CHEST 2 VIEWS: CPT | Performed by: RADIOLOGY

## 2024-04-10 PROCEDURE — 99284 EMERGENCY DEPT VISIT MOD MDM: CPT | Mod: 25 | Performed by: EMERGENCY MEDICINE

## 2024-04-10 PROCEDURE — 2500000001 HC RX 250 WO HCPCS SELF ADMINISTERED DRUGS (ALT 637 FOR MEDICARE OP): Performed by: EMERGENCY MEDICINE

## 2024-04-10 PROCEDURE — 73630 X-RAY EXAM OF FOOT: CPT | Mod: LEFT SIDE | Performed by: RADIOLOGY

## 2024-04-10 PROCEDURE — 71046 X-RAY EXAM CHEST 2 VIEWS: CPT

## 2024-04-10 PROCEDURE — 73630 X-RAY EXAM OF FOOT: CPT | Mod: LT

## 2024-04-10 RX ORDER — ACETAMINOPHEN 325 MG/1
650 TABLET ORAL ONCE
Status: COMPLETED | OUTPATIENT
Start: 2024-04-10 | End: 2024-04-10

## 2024-04-10 RX ORDER — HYDROCODONE BITARTRATE AND ACETAMINOPHEN 5; 325 MG/1; MG/1
1 TABLET ORAL ONCE
Status: COMPLETED | OUTPATIENT
Start: 2024-04-10 | End: 2024-04-10

## 2024-04-10 RX ORDER — HYDROCODONE BITARTRATE AND ACETAMINOPHEN 5; 325 MG/1; MG/1
1 TABLET ORAL EVERY 6 HOURS PRN
Qty: 8 TABLET | Refills: 0 | Status: SHIPPED | OUTPATIENT
Start: 2024-04-10 | End: 2024-04-12

## 2024-04-10 RX ADMIN — HYDROCODONE BITARTRATE AND ACETAMINOPHEN 1 TABLET: 5; 325 TABLET ORAL at 08:07

## 2024-04-10 RX ADMIN — ACETAMINOPHEN 650 MG: 325 TABLET ORAL at 08:07

## 2024-04-10 ASSESSMENT — LIFESTYLE VARIABLES
EVER HAD A DRINK FIRST THING IN THE MORNING TO STEADY YOUR NERVES TO GET RID OF A HANGOVER: NO
HAVE PEOPLE ANNOYED YOU BY CRITICIZING YOUR DRINKING: NO
TOTAL SCORE: 0
HAVE YOU EVER FELT YOU SHOULD CUT DOWN ON YOUR DRINKING: NO
EVER FELT BAD OR GUILTY ABOUT YOUR DRINKING: NO

## 2024-04-10 ASSESSMENT — PAIN - FUNCTIONAL ASSESSMENT: PAIN_FUNCTIONAL_ASSESSMENT: 0-10

## 2024-04-10 ASSESSMENT — PAIN DESCRIPTION - PROGRESSION: CLINICAL_PROGRESSION: NOT CHANGED

## 2024-04-10 ASSESSMENT — PAIN SCALES - GENERAL: PAINLEVEL_OUTOF10: 8

## 2024-04-10 NOTE — ED TRIAGE NOTES
Patient states she had a trip and fall this morning around 3am and she states she hit her head a little but is not on any blood thinners and had no LOC and patient states she feel on her left foot and left side and is having pain on that side and her foot.

## 2024-04-10 NOTE — PROGRESS NOTES
Physical Therapy Treatment    Patient Name: Pauline Orta  MRN: [id number]  Today's Date: 2/7/2024  Time Calculation  Start Time: 1415  Stop Time: 1458  Time Calculation (min): 43 min  PT Therapeutic Procedures Time Entry  Manual Therapy Time Entry: 25  Therapeutic Activity Time Entry: 15      Insurance:  Visit number: 8 of 13  Authorization info: 13 visits 12/19-3/18  Insurance Type: Payor: [employer] / Plan: [employer] / Product Type: *No Product type* /     Current Problem   1. Status post reverse total shoulder replacement, right  Follow Up In Physical Therapy          Subjective   General   Reason for Referral: R RTS 11/3/23  Referred By: Dr. Castillo  General Comment: Pt continues to report discomfort along R shoulder, did get blood work back without any finding of infection.  Precautions:     Pain   Pain Assessment: 0-10  Pain Score: 6  Post Treatment Pain Level 3    Objective   Seated R GH AROM:  Flexion - 100  Abduction - 75  ER - 12  IR  - neutral WNL, unable to perform functional     Palpation: bicep; mid delt region; infraspinatus; upper trap along cervical portion; cervical paraspinal       Treatments:  Therapeutic Activity  Therapeutic Activity Performed: Yes  Therapeutic Activity 1: UEB 3'  Therapeutic Activity 2: re-assessment see objective section    Manual Therapy  Manual Therapy Performed: Yes  Manual Therapy Activity 1: Dry needling  IDN performed this date. Pt educated on risks and benefits of dry needling. Pt provided verbal consent. All universal precautions followed.    2 - 30 mm proximal bicep   2 - 30 mm mid del tregion  1 - 30 mm infraspintuas Htrp  1 - 30 mm levator scap  1 - 30 mm cervical paraspinal C3-4 B    No adverse response. All IDN guidelines and safety protocols followed.        Assessment   Assessment:   PT Assessment  Assessment Comment: Pt has difficulty with AROM due to pain throughout supporting musculature. Reassessment for ROM measurements and update  moving fowards iteh POC. Dry needling utilized to help with pain levels and pt dide have imrpoved pain levels post session.    Plan:    Dry needling for pain; promotion of ROM and strength     OP EDUCATION:   Use of heat post DN     Goals:   Active       Mobility       Goal 1 (Progressing)       Start:  12/12/23    Expected End:  03/11/24       Pt will improve right sh AROM to WNL To improve I/ADLs.          Goal 2 (Progressing)       Start:  12/12/23    Expected End:  03/11/24       Pt will improve right sh strength to 5/5 to improve I/ADLs.            Pain       Goal 1 (Progressing)       Start:  12/12/23    Expected End:  03/11/24       Pt will perform all housework with 0/10 pain.         Goal 2 (Progressing)       Start:  12/12/23    Expected End:  03/11/24       Pt will perform all dressing activities with 0/10 pain.                   [id number]

## 2024-04-10 NOTE — ED PROVIDER NOTES
HPI   Chief Complaint   Patient presents with    Foot Injury       73-year-old female for left foot injury.  States that she slipped going into the bathroom overnight last night and now has been unable to bear weight on her left foot states it is quite bruised and swollen.  She also hit her right posterior ribs.  No head injury or loss of consciousness.  She is not on anticoagulants.      History provided by:  Patient                      No data recorded                   Patient History   Past Medical History:   Diagnosis Date    Adverse effect of anesthesia     woke during anesthesia    Anxiety     Arthritis     Cervical disc disease     Chronic pain disorder     Fibromyalgia, primary     GERD (gastroesophageal reflux disease)     Joint pain     Low back pain     Lumbosacral disc disease     Migraine     Neck pain     Osteoarthritis     Peripheral neuropathy     Spinal stenosis      Past Surgical History:   Procedure Laterality Date    CARPAL TUNNEL RELEASE Right     CATARACT EXTRACTION      CERVICAL FUSION      CERVICAL FUSION      GASTRIC BYPASS      HYSTERECTOMY      partial    JOINT REPLACEMENT      LAMINECTOMY      MR HEAD ANGIO WO IV CONTRAST  10/14/2019    MR HEAD ANGIO WO IV CONTRAST [facility]    NECK SURGERY      ORTHOPEDIC SURGERY      SPINAL FUSION      TRIGGER FINGER RELEASE Right      Family History   Problem Relation Name Age of Onset    Diabetes Mother Racquel Gonzalesrjohana     Arthritis Mother Racquel Dinero     Osteoporosis Mother Racquel Dinero     Cancer Father Ahsan Gonzalesrjohana     Lung disease Father Ahsan Gonzalesrjohana     Heart disease Father Ahsan Gonzalesrn     Alcohol abuse Father Ahsan Gonzalesrjohana     Alcohol abuse Brother Cr Gonzalesyomaira     Alcohol abuse Sister Mary Jones     Diabetes Son Billy Orta     Drug abuse Brother Cr      Social History     Tobacco Use    Smoking status: Former     Current packs/day: 0.00     Average packs/day: 0.3 packs/day for 10.0 years (2.5 ttl  pk-yrs)     Types: Cigarettes     Start date: 3/18/1980     Quit date: 3/18/1990     Years since quittin.0    Smokeless tobacco: Never   Vaping Use    Vaping status: Never Used   Substance Use Topics    Alcohol use: Not Currently    Drug use: Never       Physical Exam   ED Triage Vitals [04/10/24 0745]   Temperature Heart Rate Respirations BP   36.9 °C (98.4 °F) 90 17 117/84      Pulse Ox Temp Source Heart Rate Source Patient Position   97 % Temporal Monitor;Brachial Sitting      BP Location FiO2 (%)     Left arm --       Physical Exam  Vitals and nursing note reviewed.     Constitutional:  Awake, alert, well appearing, nontoxic  HEENT:  Normocephalic, atraumatic  Neck: Trachea midline, no stridor no midline C-spine tenderness, step-offs, or deformities  Respiratory/Chest:  Clear to auscultation bilaterally, no wheezes, rhonchi, or rales  CV:  Regular rate and regular rhythm, no murmurs, gallops, or rubs  Extremities/MSK: Tender to palpation over right posterior lateral ribs, no midline TLS tenderness, step-offs, or deformities, no abrasions or contusions, soft tissue swelling and ecchymosis over the dorsum of the entire left foot with diffuse tenderness palpation, 2+ DP/PT pulses, distal neurovascular intact with brisk capillary refill in all digits no other bony tenderness palpation in the left lower extremity or other extremities  Neuro: A/O, normal speech  Skin:  Warm and dry    ED Course & MDM   ED Course as of 04/10/24 1023   Wed Apr 10, 2024   1019 Neurovascular intact following splint application by medic for stabilization and definitive fracture management [CONSTANTIN]      ED Course User Index  [CONSTANTIN] Carlene Jaramillo MD         Diagnoses as of 04/10/24 1023   Nondisplaced fracture of proximal phalanx of left great toe, initial encounter for closed fracture   Contusion of rib on right side, initial encounter       Medical Decision Making  EMERGENCY DEPARTMENT COURSE and DIFFERENTIAL DIAGNOSIS/MDM:        The  "patient presented with a chief complaint of fall with left foot and right rib injuries.  No evidence of head or cervical spine trauma to suggest need for CT imaging of these areas although it was considered.  The differential diagnosis associated with this patient's presentation includes rib fracture, contusion, left foot fracture, contusion or sprain.  No evidence of neurovascular compromise, compartment syndrome or open fracture.  X-rays of chest and left foot on my independent interpretation with no acute cardiopulmonary process no rib fracture and a small intra-articular fracture of the proximal phalanx of the left great toe.  Given intra-articular component splint was applied for definitive fracture management by medic and neurovascular intact on my assessment following splint application.  Given walker for ambulation assistance and will treat with short course of Norco for pain control.  Referred to podiatry for follow-up.  Return precautions discussed.      Vitals:  --------------------------------------               04/10/24      04/10/24                  0745          1020     --------------------------------------   BP:          117/84        120/83     BP Location:    Left arm                  Patient Position:     Sitting                  Pulse:         90            86       Resp:          17            18       Temp:   36.9 °C (98.4 °F)             TempSrc:    Temporal                  SpO2:          97%          97%       Weight: 65.3 kg (144 lb)              Height:   1.6 m (5' 3\")              --------------------------------------    ED Course as of 04/10/24 1021  ------------------------------------------------------------  Time: 04/10 1019  Comment: Neurovascular intact following splint application by medic for stabilization and definitive fracture management  By: Carlene Jaramillo, " MD    ------------------------------------------------------------  Diagnoses as of 04/10/24 1021  Nondisplaced fracture of proximal phalanx of left great toe, initial encounter for closed fracture       History Limited by:        Independent history obtained from:          External records reviewed:          Diagnostics interpreted by me:    Xrays - see my independent interpretation in [facility]      ED Medications managed:    Medications  acetaminophen (Tylenol) tablet 650 mg (650 mg oral Given 4/10/24 0807)  HYDROcodone-acetaminophen (Norco) 5-325 mg per tablet 1 tablet (1 tablet oral Given 4/10/24 0807)            Amount and/or Complexity of Data Reviewed  Independent Historian: friend  Radiology: ordered and independent interpretation performed. Decision-making details documented in ED Course.        Procedure  Procedures     Carlene Jaramillo MD  04/10/24 1023

## 2024-04-10 NOTE — DISCHARGE INSTRUCTIONS
Keep your splint on at all times and keep clean and dry.  Return if you have discoloration of you extremity, numbness, or increasing pain after splint placement as this could indicate your splint is too tight. Take over the counter medications such as tylenol, ibuprofen, or any prescribed medication as needed for pain.  You will need to follow up with Orthopedics for re-evaluation of your injury so please make an appointment as soon as possible.

## 2024-04-12 ENCOUNTER — HOSPITAL ENCOUNTER (OUTPATIENT)
Dept: RADIOLOGY | Facility: CLINIC | Age: 74
Discharge: HOME | End: 2024-04-12
Payer: COMMERCIAL

## 2024-04-12 DIAGNOSIS — S93.325A DISLOCATION OF TARSOMETATARSAL JOINT OF LEFT FOOT, INITIAL ENCOUNTER: ICD-10-CM

## 2024-04-12 PROCEDURE — 73718 MRI LOWER EXTREMITY W/O DYE: CPT | Mod: LEFT SIDE | Performed by: RADIOLOGY

## 2024-04-12 PROCEDURE — 73718 MRI LOWER EXTREMITY W/O DYE: CPT | Mod: LT

## 2024-04-22 ENCOUNTER — APPOINTMENT (OUTPATIENT)
Dept: PHYSICAL THERAPY | Facility: CLINIC | Age: 74
End: 2024-04-22
Payer: COMMERCIAL

## 2024-04-23 ENCOUNTER — TELEPHONE (OUTPATIENT)
Dept: PRIMARY CARE | Facility: CLINIC | Age: 74
End: 2024-04-23
Payer: MEDICARE

## 2024-04-23 NOTE — TELEPHONE ENCOUNTER
PT REQUESTING REFILL HYDROXYZINE HCL PT REQUESTING AN INCREASE IN THE MG CURRENTLY IS 10 MG SENT TO [facility]

## 2024-04-24 ENCOUNTER — PATIENT MESSAGE (OUTPATIENT)
Dept: PRIMARY CARE | Facility: CLINIC | Age: 74
End: 2024-04-24
Payer: MEDICARE

## 2024-04-24 DIAGNOSIS — F41.9 ANXIETY: Primary | ICD-10-CM

## 2024-04-25 RX ORDER — HYDROXYZINE HYDROCHLORIDE 25 MG/1
25 TABLET, FILM COATED ORAL 3 TIMES DAILY
Qty: 90 TABLET | Refills: 0 | Status: SHIPPED | OUTPATIENT
Start: 2024-04-25 | End: 2024-05-25

## 2024-05-01 ENCOUNTER — DOCUMENTATION (OUTPATIENT)
Dept: PHYSICAL THERAPY | Facility: CLINIC | Age: 74
End: 2024-05-01
Payer: MEDICARE

## 2024-05-01 ENCOUNTER — APPOINTMENT (OUTPATIENT)
Dept: PHYSICAL THERAPY | Facility: CLINIC | Age: 74
End: 2024-05-01
Payer: COMMERCIAL

## 2024-05-01 NOTE — PROGRESS NOTES
Physical Therapy                 Therapy Communication Note    Patient Name: Pauline Orta  MRN: [id number]  Today's Date: 5/1/2024     Discipline: Physical Therapy    Missed Visit Reason:  PT called pt and spoke with her, she had another conflicting appointment and couldn't make it to therapy. Pt reports that due to recent foot fracture she will be NWB for another 4 weeks, discussed with pt and feel at this time it is best to hold off on current therapy, pt in agreement. Removed from schedule.     Missed Time: No Show    Comment:

## 2024-05-06 ENCOUNTER — APPOINTMENT (OUTPATIENT)
Dept: PHYSICAL THERAPY | Facility: CLINIC | Age: 74
End: 2024-05-06
Payer: COMMERCIAL

## 2024-05-13 ENCOUNTER — APPOINTMENT (OUTPATIENT)
Dept: PHYSICAL THERAPY | Facility: CLINIC | Age: 74
End: 2024-05-13
Payer: COMMERCIAL

## 2024-05-16 ENCOUNTER — APPOINTMENT (OUTPATIENT)
Dept: ORTHOPEDIC SURGERY | Facility: HOSPITAL | Age: 74
End: 2024-05-16
Payer: COMMERCIAL

## 2024-05-20 ENCOUNTER — DOCUMENTATION (OUTPATIENT)
Dept: PHYSICAL THERAPY | Facility: CLINIC | Age: 74
End: 2024-05-20
Payer: MEDICARE

## 2024-05-20 ENCOUNTER — APPOINTMENT (OUTPATIENT)
Dept: PHYSICAL THERAPY | Facility: CLINIC | Age: 74
End: 2024-05-20
Payer: COMMERCIAL

## 2024-05-20 NOTE — PROGRESS NOTES
Physical Therapy    Discharge Summary    Name: Pauline Orta  MRN: [id number]  : 1950  Date: 24    Discharge Summary: PT    Discharge Information: Date of discharge 24, Date of last visit 2024, and Date of evaluation 2023    Therapy Summary: Pt compliant with HEP and attendance to therapy however toward end of therapy services pt was having increased pain, referred back to MD, instructed to continue with exercise. Pt unable to continue with therapy due to other medical conditions. Discharge current POC at this time.     Discharge Status: Goals progressing     Rehab Discharge Reason: Other medical coditions

## 2024-06-04 ENCOUNTER — OFFICE VISIT (OUTPATIENT)
Dept: ORTHOPEDIC SURGERY | Facility: CLINIC | Age: 74
End: 2024-06-04
Payer: MEDICARE

## 2024-06-04 DIAGNOSIS — M70.50 PES ANSERINE BURSITIS: Primary | ICD-10-CM

## 2024-06-04 DIAGNOSIS — M25.562 ACUTE PAIN OF LEFT KNEE: ICD-10-CM

## 2024-06-04 RX ORDER — LIDOCAINE HYDROCHLORIDE 10 MG/ML
1 INJECTION INFILTRATION; PERINEURAL
Status: COMPLETED | OUTPATIENT
Start: 2024-06-04 | End: 2024-06-04

## 2024-06-04 RX ORDER — TRIAMCINOLONE ACETONIDE 40 MG/ML
20 INJECTION, SUSPENSION INTRA-ARTICULAR; INTRAMUSCULAR
Status: COMPLETED | OUTPATIENT
Start: 2024-06-04 | End: 2024-06-04

## 2024-06-04 RX ADMIN — TRIAMCINOLONE ACETONIDE 20 MG: 40 INJECTION, SUSPENSION INTRA-ARTICULAR; INTRAMUSCULAR at 09:43

## 2024-06-04 RX ADMIN — LIDOCAINE HYDROCHLORIDE 1 ML: 10 INJECTION INFILTRATION; PERINEURAL at 09:43

## 2024-06-04 NOTE — PROGRESS NOTES
** Please excuse any errors in grammar or translation related to this dictation. Voice recognition software was utilized to prepare this document. **    Assessment & Plan:  Findings today are most consistent with pes anserine bursitis.  She has focal tenderness at the pes insertion and no current joint line ttp.  Likely from wearing walking boot for the past few months after midfoot arthrodesis stemming from Lisfranc injury contributed to this.  Thankfully she will soon be out of the walking boot in the next few weeks.  For her pain she was offered steroid injection today which she agreed to have completed.  Return precautions given.  Follow-up as needed for any ongoing knee pain concerns; she can have repeat knee joint injection anytime after 6/27.    Chief complaint:  Left Knee pain    HPI:  6/4/24:  Patient reports the injection on 3/27 provided relief for close to 2 months until she started using a knee scooter on the left leg due to foot fracture. Did have arthrodesis for Lisfranc injury.  She is wearing a tall walking boot.  Denies any new injuries to the knee.  She is not taking medication for pain.    3/27/24: 72 y/o F presents with left knee pain.  This complaint has been ongoing for at least 5+ months.  No mechanism of injury reported at onset. Symptoms have progressively worsened with time.  Pain is most prominent at medial aspect of knees and more pronounced on left side. Reports feeling of instability.  Symptoms are aggravated by stairs, walking distance. To date, patient has tried a variety of treatments to include gabapentin, tylenol, voltaren gel with little sustained effect. Denies previous surgery to this site.       Patient Active Problem List   Diagnosis    Anxiety    Arthritis of right glenohumeral joint    Chronic pain    Osteoarthritis    Gastroesophageal reflux disease    Arthritis of right shoulder region    Status post total shoulder arthroplasty, right    Cervical spondylosis    Foraminal  stenosis of cervical region    Degeneration of lumbar or lumbosacral intervertebral disc    Irritable bowel syndrome    Migraine headache    KALLI (obstructive sleep apnea)    Osteopenia, senile    Postlaminectomy syndrome, cervical region    Primary insomnia    Spinal stenosis of lumbar region    Fibromyalgia    Unintended weight loss    Family history of dementia     Past Surgical History:   Procedure Laterality Date    CARPAL TUNNEL RELEASE Right     CATARACT EXTRACTION      CERVICAL FUSION      CERVICAL FUSION      GASTRIC BYPASS      HYSTERECTOMY      partial    JOINT REPLACEMENT      LAMINECTOMY      MR HEAD ANGIO WO IV CONTRAST  10/14/2019    MR HEAD ANGIO WO IV CONTRAST [facility]    NECK SURGERY      ORTHOPEDIC SURGERY      SPINAL FUSION      TRIGGER FINGER RELEASE Right      Current Outpatient Medications on File Prior to Visit   Medication Sig Dispense Refill    ascorbic acid (Vitamin C) 500 mg tablet Take 1 tablet (500 mg) by mouth once daily.      calcium citrate-vitamin D2 250 mg-2.5 mcg (100 unit) tablet Take 1 tablet by mouth once daily.      cholecalciferol (Vitamin D-3) 125 MCG (5000 UT) capsule Take 1 capsule (125 mcg) by mouth once daily.      cyanocobalamin (Vitamin B-12) 250 mcg tablet Take 1 tablet (250 mcg) by mouth once daily.      cyclobenzaprine (Flexeril) 5 mg tablet Take 1 tablet (5 mg) by mouth 3 times a day as needed.      diclofenac sodium (Voltaren) 1 % gel gel Apply 4.5 inches (4 g) topically 4 times a day as needed.      DULoxetine (Cymbalta) 30 mg DR capsule Take 1 capsule (30 mg) by mouth once daily. DO NOT CRUSH OR CHEW 30 capsule 1    estradiol (Estrace) 0.01 % (0.1 mg/gram) vaginal cream INSERT 1 GRAM VAGINALLY 2 TIMES A WEEK      gabapentin (Neurontin) 800 mg tablet Take 1 tablet (800 mg) by mouth 3 times a day. 90 tablet 3    hydrOXYzine HCL (Atarax) 25 mg tablet Take 1 tablet (25 mg) by mouth 3 times a day. 90 tablet 0    multivitamin tablet Take 1 tablet by mouth  once daily.      naratriptan (Amerge) 2.5 mg tablet Take 1 tablet (2.5 mg) by mouth 1 time if needed for headaches.  ONSET OF HEADACHE. MAY REPEAT DOSE AFTER 4 HOURS IF DOES NOT RESOLVE. DO NOT EXCEED 5MG IN 24 HRS      omega 3-dha-epa-fish oil (Fish OiL) 1,000 mg (120 mg-180 mg) capsule Take 1 capsule (1,000 mg) by mouth once daily.      omeprazole OTC (PriLOSEC OTC) 20 mg EC tablet Take 1 tablet (20 mg) by mouth 2 times a day.      polyethylene glycol (Glycolax, Miralax) 17 gram packet Take 17 g by mouth once daily.      Restasis 0.05 % ophthalmic emulsion Administer 1 drop into both eyes 2 times a day as needed (dry eyes).      sertraline (Zoloft) 50 mg tablet TAKE 1 TABLET BY MOUTH EVERY DAY 90 tablet 1    topiramate (Topamax) 25 mg tablet       TUMERIC-GING-OLIVE-OREG-CAPRYL ORAL Take 1 mg by mouth once daily.       No current facility-administered medications on file prior to visit.       Exam:  LEFT Knee examined. No effusion, ecchymosis, or erythema.  AROM from 0 to 120 deg with 5/5 strength. SILT overlying knee. Motion crepitus present. No joint line ttp.  TTP at pes insertion.  No popliteal mass palpated. Negative anterior and posterior drawer.  No laxity to varus or valgus stress at 0 or 30 deg.  No patellar apprehension.        General Exam:  Constitutional - NAD, AAO x 3, conversing appropriately.  HEENT- Normocephalic and atraumatic. EOMI, PERRLA, No scleral icterus. No facial deformities. Hearing grossly normal.  Lungs - Breathing non-labored with normal rate. No accessory muscle use.  CV - Extremities warm and well-perfused, brisk capillary refill present.   Neuro - CN II-XII grossly intact.    Results:  X-ray bilateral knees obtained 8/12/2024: mild tricompartmental degenerative changes.  Normal alignment.    Lab Results   Component Value Date    HGBA1C 5.5 03/31/2023    CREATININE 0.80 11/04/2023    EGFR 77 11/04/2023      Procedure:  Patient ID: Pauline Orta is a 73 y.o. female.    RUKHSANA  Inj/Asp: L anserine bursa on 6/4/2024 9:43 AM  Indications: pain  Details: 25 G needle, anterolateral approach  Medications: 20 mg triamcinolone acetonide 40 mg/mL; 1 mL lidocaine 10 mg/mL (1 %)  Outcome: tolerated well, no immediate complications    Procedure risk factors to include increased pain, bleeding, infection, neurovascular injury, soft tissue injury, transient elevation of blood glucose and blood pressure, and adverse reaction to medication were discussed with the patient. Patient understands there is a moderate risk of morbidity from undergoing the procedure.  Procedure, treatment alternatives, risks and benefits explained, specific risks discussed. Consent was given by the patient. Immediately prior to procedure a time out was called to verify the correct patient, procedure, equipment, support staff and site/side marked as required. Patient was prepped and draped in the usual sterile fashion.

## 2024-06-06 ENCOUNTER — OFFICE VISIT (OUTPATIENT)
Dept: PAIN MEDICINE | Facility: CLINIC | Age: 74
End: 2024-06-06
Payer: COMMERCIAL

## 2024-06-06 VITALS
BODY MASS INDEX: 25.7 KG/M2 | SYSTOLIC BLOOD PRESSURE: 134 MMHG | HEIGHT: 63 IN | HEART RATE: 93 BPM | OXYGEN SATURATION: 96 % | WEIGHT: 145.06 LBS | DIASTOLIC BLOOD PRESSURE: 88 MMHG

## 2024-06-06 DIAGNOSIS — M25.511 CHRONIC RIGHT SHOULDER PAIN: ICD-10-CM

## 2024-06-06 DIAGNOSIS — M79.7 FIBROMYALGIA: ICD-10-CM

## 2024-06-06 DIAGNOSIS — M47.812 CERVICAL SPONDYLOSIS WITHOUT MYELOPATHY: ICD-10-CM

## 2024-06-06 DIAGNOSIS — G89.29 CHRONIC RIGHT SHOULDER PAIN: ICD-10-CM

## 2024-06-06 PROCEDURE — 1160F RVW MEDS BY RX/DR IN RCRD: CPT | Performed by: PHYSICIAN ASSISTANT

## 2024-06-06 PROCEDURE — 1159F MED LIST DOCD IN RCRD: CPT | Performed by: PHYSICIAN ASSISTANT

## 2024-06-06 PROCEDURE — 99214 OFFICE O/P EST MOD 30 MIN: CPT | Performed by: PHYSICIAN ASSISTANT

## 2024-06-06 PROCEDURE — 1036F TOBACCO NON-USER: CPT | Performed by: PHYSICIAN ASSISTANT

## 2024-06-06 RX ORDER — GABAPENTIN 800 MG/1
800 TABLET ORAL 3 TIMES DAILY
Qty: 90 TABLET | Refills: 3 | Status: SHIPPED | OUTPATIENT
Start: 2024-06-06 | End: 2024-10-04

## 2024-06-06 RX ORDER — DULOXETIN HYDROCHLORIDE 30 MG/1
30 CAPSULE, DELAYED RELEASE ORAL DAILY
Qty: 30 CAPSULE | Refills: 1 | Status: SHIPPED | OUTPATIENT
Start: 2024-06-06

## 2024-06-06 ASSESSMENT — ENCOUNTER SYMPTOMS
FEVER: 0
DEPRESSION: 0
NAUSEA: 0
SORE THROAT: 0
COUGH: 0
DIARRHEA: 0
SHORTNESS OF BREATH: 0
CHILLS: 0
SLEEP DISTURBANCE: 0
UNEXPECTED WEIGHT CHANGE: 0
PALPITATIONS: 0
VOICE CHANGE: 0
OCCASIONAL FEELINGS OF UNSTEADINESS: 0
FATIGUE: 0
ACTIVITY CHANGE: 0
VOMITING: 0
CHEST TIGHTNESS: 0
LOSS OF SENSATION IN FEET: 0
NECK PAIN: 1

## 2024-06-06 ASSESSMENT — PAIN DESCRIPTION - DESCRIPTORS: DESCRIPTORS: SPASM;ACHING

## 2024-06-06 ASSESSMENT — PAIN - FUNCTIONAL ASSESSMENT: PAIN_FUNCTIONAL_ASSESSMENT: 0-10

## 2024-06-06 ASSESSMENT — PAIN SCALES - GENERAL: PAINLEVEL_OUTOF10: 5 - MODERATE PAIN

## 2024-06-06 NOTE — PROGRESS NOTES
Subjective   Patient ID: Pauline Orta is a 73 y.o. female who presents for Neck Pain and Arm Pain.  Patient is a 73-year-old female with cervical radiculopathy right chronic shoulder pain and new injury to the left foot that presents today for follow-up.  Patient does note that the last epidural injection is still providing some benefit to her relief.  She still does continue to have some additional symptoms.  She does note that the TENS unit is drastically beneficial for reducing this in conjunction with the use of her medications.  She does not feel that she is ready for another steroid injection in at this time.  Patient does note on April 10 she did fall and fractured her left foot.  By tripping over a chair.  She had a procedure where some plates and screws were applied to the foot.  She did notes that she should be having the boot off tomorrow.  She does do note that the walking in the boot does provide some aggravation to her back    Neck Pain   Pertinent negatives include no chest pain or fever.   Arm Pain   Pertinent negatives include no chest pain.       Review of Systems   Constitutional:  Negative for activity change, chills, fatigue, fever and unexpected weight change.   HENT:  Negative for ear pain, sore throat and voice change.    Eyes:  Negative for visual disturbance.   Respiratory:  Negative for cough, chest tightness and shortness of breath.    Cardiovascular:  Negative for chest pain and palpitations.   Gastrointestinal:  Negative for diarrhea, nausea and vomiting.   Musculoskeletal:  Positive for neck pain.   Psychiatric/Behavioral:  Negative for behavioral problems, self-injury, sleep disturbance and suicidal ideas.        Objective   Physical Exam  Vitals reviewed.   Constitutional:       Appearance: Normal appearance.   HENT:      Head: Normocephalic and atraumatic.      Mouth/Throat:      Mouth: Mucous membranes are moist.   Neck:      Vascular: No JVD.   Pulmonary:      Effort:  Pulmonary effort is normal. No tachypnea or bradypnea.   Abdominal:      Palpations: Abdomen is soft.   Musculoskeletal:      Cervical back: Spasms and tenderness present. Normal range of motion.        Back:       Comments: Positive facet loading   Feet:      Comments: Left foot boot.   Skin:     General: Skin is warm and dry.   Neurological:      Mental Status: She is alert and oriented to person, place, and time.   Psychiatric:         Mood and Affect: Mood normal.         Behavior: Behavior normal. Behavior is cooperative.       Assessment/Plan   Problem List Items Addressed This Visit             ICD-10-CM    Fibromyalgia M79.7     Other Visit Diagnoses         Codes    Chronic right shoulder pain     M25.511, G89.29    Cervical spondylosis without myelopathy     M47.812          I had nice discussion with the patient today our plan will be as follows.      Radiology: [ none at this time ]      Physically:  [ continue modification of activities, healthy lifestyle choice ]      Psychologically:  [ No acute psychological concerns ]      Medication: [I will refill the medications at the same dose and frequency. We will continue to monitor the patient every 3 months for compliance, adverse reaction or interactions The patient continues to see benefit and improvement in their quality of life and ability to maintain ADLs. Patient educated about the risks of taking sedatives and operating a motor vehicle. Patient reports no adverse side effects to current medication regimen. Current regimen does allow patient to maintain ADLs. Oarrs has been reviewed. No suspicion of diversion or abuse. Compliance with medication regime, no use of illicit drugs, no sharing of narcotic medications with others, do not use others narcotic medication, and to avoid alcohol use. Patient has been educated on the risks, benefits, and alternatives of controlled substances as well as the proper way to store these medications.   The patient and  I discussed the nature of this medication and its side effects. We discussed tolerance, physical dependence, psychological dependence, addiction and opioid-induced hyperalgesia ]      Duration:  [ 3 months ]      Intervention:  [ none at this time. Patient is stable.  ]           Michael Carrera PA-C 06/06/24 1:24 PM

## 2024-06-08 DIAGNOSIS — M47.812 CERVICAL SPONDYLOSIS WITHOUT MYELOPATHY: ICD-10-CM

## 2024-06-10 RX ORDER — GABAPENTIN 800 MG/1
TABLET ORAL
Qty: 90 TABLET | Refills: 3 | OUTPATIENT
Start: 2024-06-10

## 2024-07-16 DIAGNOSIS — F41.9 ANXIETY: ICD-10-CM

## 2024-07-18 RX ORDER — HYDROXYZINE HYDROCHLORIDE 25 MG/1
25 TABLET, FILM COATED ORAL 3 TIMES DAILY
Qty: 90 TABLET | Refills: 1 | Status: SHIPPED | OUTPATIENT
Start: 2024-07-18

## 2024-07-24 ENCOUNTER — TELEPHONE (OUTPATIENT)
Dept: PRIMARY CARE | Facility: CLINIC | Age: 74
End: 2024-07-24
Payer: MEDICARE

## 2024-08-01 ENCOUNTER — EVALUATION (OUTPATIENT)
Dept: PHYSICAL THERAPY | Facility: CLINIC | Age: 74
End: 2024-08-01
Payer: MEDICARE

## 2024-08-01 DIAGNOSIS — M79.673 PAIN, FOOT: ICD-10-CM

## 2024-08-01 PROCEDURE — 97162 PT EVAL MOD COMPLEX 30 MIN: CPT | Mod: GP

## 2024-08-01 PROCEDURE — 97110 THERAPEUTIC EXERCISES: CPT | Mod: GP

## 2024-08-01 ASSESSMENT — ENCOUNTER SYMPTOMS
OCCASIONAL FEELINGS OF UNSTEADINESS: 1
DEPRESSION: 0
LOSS OF SENSATION IN FEET: 1

## 2024-08-01 NOTE — PROGRESS NOTES
Physical Therapy Evaluation and Treatment      Patient Name: Pauline Orta  MRN: [id number]  Today's Date: 8/1/2024   145-235pm  Moderate complexity due to patient's clinical presentation being evolving with changing characteristics, with comorbidities/complexities to include multiple Left ankle/foot issues, all of which may negatively impact rehab tolerance and progression.    Insurance:  [place]: 1 of [place]  Authorization info: 1) [employer] ASSURE - NO AUTH / [place]ISITS / DEDUCT $240 - MET, then 80% COVERED / [employer] [id number] / ds 7/31/24  2) [facility] Medicaid - Active / 100% of contracted rate / [id number].  Insurance Type: Payor: [employer] MEDICARE / Plan: [employer] MEDICARE ASSURE / Product Type: *No Product type* /     Current Problem:   1. Pain, foot  Referral to Physical Therapy          Subjective    General:  General  Reason for Referral: L foot/ankle pain  Referred By: lupica  Surgery - 4/18/24 Left ankle surgery (boot removed 5/29/24) 2 screws  Foot fracture left June 2024 (denied cast/boot following foot fracture)  Injections left knee  Precautions:   Possible fall risk  Pain:   Left foot/ankle 7-8/10  Home Living:   Lives alone , no stairs  Prior Level of Function:   Independent    Objective   General Assessments:  Quad cane PRN (community ambulation)  Extremity/Trunk Assessments:  Left -   Knee - WFL  Ankle - DF 5'    4-/5              PF 30'   4-/5   Inv 10'   4-/5   Ever 5'            4-/5  Treatments:  HEP -Access Code: [email or web address]  URL: [email or web address]  Date: 08/01/2024  Prepared by: Mario Chaparro    Exercises  - Gastroc Stretch on Wall  - 1 x daily - 7 x weekly - 1 sets - 2 reps - 15 hold  - Sit to Stand  - 1 x daily - 7 x weekly - 1 sets - 10 reps  - Seated Heel Raise  - 1 x daily - 7 x weekly - 1 sets - 1 reps - 15 hold  - Seated Ankle Alphabet  - 1 x daily - 7 x weekly - 1 sets - 1 reps  - Seated Toe Raise  - 1 x daily - 7 x weekly - 1 sets - 1 reps - 15 hold  - Standing Heel Raise  with Support  - 1 x daily - 7 x weekly - 1 sets - 1 reps - 10 hold  - Supine Ankle Pumps  - 1 x daily - 7 x weekly - 1 sets - 20 reps        Assessment:   Nicolás all activities.  Will benefit from skilled PT    Limited ROM/strength L ankle;e/knee  Pain , + compensatory gait pattern    Plan:  Treatment/Interventions: Education/ Instruction, Gait training, Manual therapy, Neuromuscular re-education, Therapeutic activities, Therapeutic exercises  PT Plan: Skilled PT  PT Frequency: 2 times per week  Duration: 6-8wks  Onset Date: 04/18/24  Rehab Potential: Good  Plan of Care Agreement: [id number]/wk 4-6wks    OP EDUCATION:   HEP    Goals:  Frequency - 2x/wk x 6 wks    Goals:  SHORT TERM GOALS:  Patient will report decrease in pain from **/10 to </= **/10 to improve quality of life.   Patient will improve ankle AROM to WFL in order to improve gait mechanics and functional mobility  Patient will demonstrate sit to stand x10 without UE to demonstrate improved LE strength.  Patient will improve 50% score to </= 10 seconds to reduce fall risk.   Patient independent with prescribed HEP  Pt Education - Independent postural and  awareness and joint protection program  LONG TERM GOALS:  Patient will be independent with HEP to promote self management of condition  Patient will perform reciprocal stair negotiation to demonstrate improved LE strength.   Patient will ambulate with least restrictive device and proper gait mechanics to promote return to prior level of function.    Functional Level - reported % (hobbies/work/recreation)

## 2024-08-13 DIAGNOSIS — G89.29 OTHER CHRONIC PAIN: ICD-10-CM

## 2024-08-14 RX ORDER — TIZANIDINE 4 MG/1
4 TABLET ORAL EVERY 8 HOURS PRN
Qty: 90 TABLET | Refills: 2 | Status: SHIPPED | OUTPATIENT
Start: 2024-08-14

## 2024-08-15 ENCOUNTER — TREATMENT (OUTPATIENT)
Dept: PHYSICAL THERAPY | Facility: CLINIC | Age: 74
End: 2024-08-15
Payer: MEDICARE

## 2024-08-15 DIAGNOSIS — M25.511 CHRONIC RIGHT SHOULDER PAIN: ICD-10-CM

## 2024-08-15 DIAGNOSIS — M79.7 FIBROMYALGIA: ICD-10-CM

## 2024-08-15 DIAGNOSIS — M79.673 PAIN, FOOT: ICD-10-CM

## 2024-08-15 DIAGNOSIS — G89.29 CHRONIC RIGHT SHOULDER PAIN: ICD-10-CM

## 2024-08-15 DIAGNOSIS — M79.672 LEFT FOOT PAIN: Primary | ICD-10-CM

## 2024-08-15 PROCEDURE — 97112 NEUROMUSCULAR REEDUCATION: CPT | Mod: GP | Performed by: PHYSICAL THERAPIST

## 2024-08-15 PROCEDURE — 97110 THERAPEUTIC EXERCISES: CPT | Mod: GP | Performed by: PHYSICAL THERAPIST

## 2024-08-15 ASSESSMENT — PAIN SCALES - GENERAL: PAINLEVEL_OUTOF10: 0 - NO PAIN

## 2024-08-15 ASSESSMENT — PAIN - FUNCTIONAL ASSESSMENT: PAIN_FUNCTIONAL_ASSESSMENT: 0-10

## 2024-08-15 NOTE — PROGRESS NOTES
"Physical Therapy Treatment    Patient Name: Pauline Orta  MRN: [id number]  Today's Date: 8/15/2024  Time Calculation  Start Time: 1026  Stop Time: 1104  Time Calculation (min): 38 min  PT Therapeutic Procedures Time Entry  Neuromuscular Re-Education Time Entry: 13  Therapeutic Exercise Time Entry: 25    Insurance:  Visit number: 2 of 11  Authorization info: [place]   Insurance Type: Payor: AET MEDICARE / Plan: AETNA MEDICARE ASSURE / Product Type: *No Product type* /     Current Problem   1. Left foot pain        2. Pain, foot  Follow Up In Physical Therapy          Subjective   General   Reason for Referral: L foot/ankle pain  Referred By: sanjeev  General Comment: Pt reports her foot is doing well, she is able to walk better with less pain. She reprots if she everts her foot it still hurts. She has been unable to walk on uneven surfaces still.  Precautions:  Precautions  Precautions Comment: None  Pain   Pain Assessment: 0-10  0-10 (Numeric) Pain Score: 0 - No pain  Post Treatment Pain Level 0    Objective   Hip/knee compensation during wobble board  High fear of falling     Treatments:  Therapeutic Exercise:  Therapeutic Exercise  Therapeutic Exercise Performed: Yes  Therapeutic Exercise Activity 1: AP control on wobble board 30x  Therapeutic Exercise Activity 2: medial/lateral control on wobble board 30x  Therapeutic Exercise Activity 3: toe curls 30x with towel  Therapeutic Exercise Activity 4: AROM inversion/eversion 10x3  Therapeutic Exercise Activity 5: OTB eversion 10x3  Therapeutic Exercise Activity 6: seated ankle DF/PF 10x3    Neuro Re-ed:   Balance/Neuromuscular Re-Education  Balance/Neuromuscular Re-Education Activity Performed: Yes  Balance/Neuromuscular Re-Education Activity 1: firm surface romberg 30\"x2 EO; 30\"x2 EC  Balance/Neuromuscular Re-Education Activity 2: firm surface semitandem EO 30\"x3 R/L      Assessment   Assessment:   PT Assessment  Assessment Comment: Pt tolerated " session well discomfort at end range DF instructed to perform exercise within pain free range. Greatest deficits noted during balance activities this date. WIll continue to focus on balance tasks due to reduced L ankle proprioception.    Plan:    Balance training along with general L ankle/foot strengthening     OP EDUCATION:   Updated HEP     Goals:

## 2024-08-16 RX ORDER — DULOXETIN HYDROCHLORIDE 30 MG/1
CAPSULE, DELAYED RELEASE ORAL
Qty: 30 CAPSULE | Refills: 1 | Status: SHIPPED | OUTPATIENT
Start: 2024-08-16

## 2024-08-21 ENCOUNTER — TREATMENT (OUTPATIENT)
Dept: PHYSICAL THERAPY | Facility: CLINIC | Age: 74
End: 2024-08-21
Payer: MEDICARE

## 2024-08-21 DIAGNOSIS — M79.672 LEFT FOOT PAIN: Primary | ICD-10-CM

## 2024-08-21 DIAGNOSIS — M79.673 PAIN, FOOT: ICD-10-CM

## 2024-08-21 PROCEDURE — 97110 THERAPEUTIC EXERCISES: CPT | Mod: GP,CQ

## 2024-08-21 PROCEDURE — 97140 MANUAL THERAPY 1/> REGIONS: CPT | Mod: GP,CQ

## 2024-08-21 NOTE — PROGRESS NOTES
"Physical Therapy Treatment    Patient Name: Pauline Orta  MRN: [id number]  Today's Date: 8/21/2024  Time Calculation  Start Time: 1500  Stop Time: 1540  Time Calculation (min): 40 min  PT Therapeutic Procedures Time Entry  Manual Therapy Time Entry: 10  Therapeutic Exercise Time Entry: 30    Insurance:  Visit number: 3 of 11  Authorization info: 1) AETNA ASSURE - NO AUTH / [place]240 - MET, then 80% COVERED / AVAILITY [id number] / ds 7/31/24  2) [facility] Medicaid - Active / 100% of contracted rate / [id number][id number].      1/1/24 - Medical Necessity  Insurance Type: Payor: AETNA MEDICARE / Plan: AETNA MEDICARE ASSURE / Product Type: *No Product type* /     Current Problem   1. Left foot pain        2. Pain, foot  Follow Up In Physical Therapy          Subjective   General    Pt feels her ankle is just weak  Precautions:   None  Pain    0  Post Treatment Pain Level 0    Objective   Tenderness along L ATFL are    Treatments:  Therapeutic Exercise:   Nu-step x6'  Mini MIP on foam pad x 40  NBOS on foam pad x 2'  Alt taps to 4\" step x 40  Side stepping 10 x in // bars  Supine Inversion/DF strengthening with GTB 2 x 15  Manual:   K-taping for medial arch and ankle support    Assessment   Assessment:    Pt felt more confident with her gait after taping.    Plan:    Progress with POC    OP EDUCATION:       Goals:     Active    "

## 2024-08-27 ENCOUNTER — APPOINTMENT (OUTPATIENT)
Dept: PHYSICAL THERAPY | Facility: CLINIC | Age: 74
End: 2024-08-27
Payer: MEDICARE

## 2024-08-29 ENCOUNTER — TREATMENT (OUTPATIENT)
Dept: PHYSICAL THERAPY | Facility: CLINIC | Age: 74
End: 2024-08-29
Payer: MEDICARE

## 2024-08-29 DIAGNOSIS — M79.673 PAIN, FOOT: ICD-10-CM

## 2024-08-29 DIAGNOSIS — M79.672 LEFT FOOT PAIN: Primary | ICD-10-CM

## 2024-08-29 PROCEDURE — 97110 THERAPEUTIC EXERCISES: CPT | Mod: GP | Performed by: PHYSICAL THERAPIST

## 2024-08-29 PROCEDURE — 97112 NEUROMUSCULAR REEDUCATION: CPT | Mod: GP | Performed by: PHYSICAL THERAPIST

## 2024-08-29 ASSESSMENT — PAIN SCALES - GENERAL: PAINLEVEL_OUTOF10: 0 - NO PAIN

## 2024-08-29 ASSESSMENT — PAIN - FUNCTIONAL ASSESSMENT: PAIN_FUNCTIONAL_ASSESSMENT: 0-10

## 2024-08-29 NOTE — PROGRESS NOTES
"Physical Therapy Treatment    Patient Name: Pauline Orta  MRN: [id number]  Today's Date: 8/29/2024  Time Calculation  Start Time: 1334  Stop Time: 1412  Time Calculation (min): 38 min  PT Therapeutic Procedures Time Entry  Neuromuscular Re-Education Time Entry: 23  Therapeutic Exercise Time Entry: 15    Insurance:  Visit number: 3 of 11  [place]ization info: [place]   Insurance Type: Payor: AETNA MEDICARE / Plan: AETNA MEDICARE ASSURE / Product Type: *No Product type* /     Current Problem   1. Left foot pain        2. Pain, foot  Follow Up In Physical Therapy          Subjective   General   Reason for Referral: L foot/ankle pain  Referred By: sanjeev  General Comment: Pt does not report ankle pain but has noticed some toe soreness and felt that her ankle was going to roll on her again.  Precautions:  Precautions  Precautions Comment: None  Pain   Pain Assessment: 0-10  0-10 (Numeric) Pain Score: 0 - No pain  Post Treatment Pain Level 0    Objective   Reduced L ankle stability     Treatments:  Therapeutic Exercise:  Therapeutic Exercise  Therapeutic Exercise Performed: Yes  Therapeutic Exercise Activity 1: NuStep L3 6' BLE only  Therapeutic Exercise Activity 2: seated toe raise to curl 10x3  Therapeutic Exercise Activity 3: seated heel to toe 10x3  Therapeutic Exercise Activity 4: KT tape applied along lateral malleoli    Neuro Re-ed:   Balance/Neuromuscular Re-Education  Balance/Neuromuscular Re-Education Activity Performed: Yes  Balance/Neuromuscular Re-Education Activity 1: firm surface romberg EO/EC 30\"x3 each  Balance/Neuromuscular Re-Education Activity 2: firm surface romberg to semitandem 10x3  Balance/Neuromuscular Re-Education Activity 3: foam surface EO with head turns 10x3 no UE  Balance/Neuromuscular Re-Education Activity 4: foam surface EC 30\"x3 no UE         Assessment   Assessment:   PT Assessment  Assessment Comment: Pt tolerated session well continued to focus on balance and stabiity " of L ankle without any LOB but instability noted, will continue as tolerated    Plan:    Ankle stability     OP EDUCATION:   Continue with established HEP     Goals:      SHORT TERM GOALS:  Patient will report decrease in pain from **/10 to </= **/10 to improve quality of life.   Patient will improve ankle AROM to WFL in order to improve gait mechanics and functional mobility  Patient will demonstrate sit to stand x10 without UE to demonstrate improved LE strength.  Patient will improve 50% score to </= 10 seconds to reduce fall risk.   Patient independent with prescribed HEP  Pt Education - Independent postural and  awareness and joint protection program  LONG TERM GOALS:  Patient will be independent with HEP to promote self management of condition  Patient will perform reciprocal stair negotiation to demonstrate improved LE strength.   Patient will ambulate with least restrictive device and proper gait mechanics to promote return to prior level of function.    Functional Level - reported % (hobbies/work/recreation)

## 2024-09-05 ENCOUNTER — TREATMENT (OUTPATIENT)
Dept: PHYSICAL THERAPY | Facility: CLINIC | Age: 74
End: 2024-09-05
Payer: MEDICARE

## 2024-09-05 ENCOUNTER — OFFICE VISIT (OUTPATIENT)
Dept: PAIN MEDICINE | Facility: CLINIC | Age: 74
End: 2024-09-05
Payer: MEDICARE

## 2024-09-05 VITALS
BODY MASS INDEX: 26.68 KG/M2 | OXYGEN SATURATION: 98 % | RESPIRATION RATE: 18 BRPM | WEIGHT: 145 LBS | DIASTOLIC BLOOD PRESSURE: 88 MMHG | HEIGHT: 62 IN | SYSTOLIC BLOOD PRESSURE: 125 MMHG | HEART RATE: 85 BPM

## 2024-09-05 DIAGNOSIS — M79.672 LEFT FOOT PAIN: Primary | ICD-10-CM

## 2024-09-05 DIAGNOSIS — M79.7 FIBROMYALGIA: ICD-10-CM

## 2024-09-05 DIAGNOSIS — M96.1 POSTLAMINECTOMY SYNDROME, CERVICAL REGION: Primary | ICD-10-CM

## 2024-09-05 DIAGNOSIS — G89.29 OTHER CHRONIC PAIN: ICD-10-CM

## 2024-09-05 DIAGNOSIS — M25.511 CHRONIC RIGHT SHOULDER PAIN: ICD-10-CM

## 2024-09-05 DIAGNOSIS — G89.29 CHRONIC RIGHT SHOULDER PAIN: ICD-10-CM

## 2024-09-05 DIAGNOSIS — M47.812 CERVICAL SPONDYLOSIS WITHOUT MYELOPATHY: ICD-10-CM

## 2024-09-05 DIAGNOSIS — M79.673 PAIN, FOOT: ICD-10-CM

## 2024-09-05 PROCEDURE — 97112 NEUROMUSCULAR REEDUCATION: CPT | Mod: GP | Performed by: PHYSICAL THERAPIST

## 2024-09-05 PROCEDURE — 1160F RVW MEDS BY RX/DR IN RCRD: CPT | Performed by: PHYSICIAN ASSISTANT

## 2024-09-05 PROCEDURE — 1036F TOBACCO NON-USER: CPT | Performed by: PHYSICIAN ASSISTANT

## 2024-09-05 PROCEDURE — 99214 OFFICE O/P EST MOD 30 MIN: CPT | Performed by: PHYSICIAN ASSISTANT

## 2024-09-05 PROCEDURE — 1125F AMNT PAIN NOTED PAIN PRSNT: CPT | Performed by: PHYSICIAN ASSISTANT

## 2024-09-05 PROCEDURE — 1159F MED LIST DOCD IN RCRD: CPT | Performed by: PHYSICIAN ASSISTANT

## 2024-09-05 PROCEDURE — 97110 THERAPEUTIC EXERCISES: CPT | Mod: GP | Performed by: PHYSICAL THERAPIST

## 2024-09-05 RX ORDER — GABAPENTIN 800 MG/1
800 TABLET ORAL 3 TIMES DAILY
Qty: 90 TABLET | Refills: 3 | Status: SHIPPED | OUTPATIENT
Start: 2024-09-05 | End: 2025-01-03

## 2024-09-05 RX ORDER — TIZANIDINE 4 MG/1
4 TABLET ORAL EVERY 8 HOURS PRN
Qty: 90 TABLET | Refills: 2 | Status: SHIPPED | OUTPATIENT
Start: 2024-09-05

## 2024-09-05 RX ORDER — SODIUM CHLORIDE, SODIUM LACTATE, POTASSIUM CHLORIDE, CALCIUM CHLORIDE 600; 310; 30; 20 MG/100ML; MG/100ML; MG/100ML; MG/100ML
20 INJECTION, SOLUTION INTRAVENOUS CONTINUOUS
OUTPATIENT
Start: 2024-09-05

## 2024-09-05 RX ORDER — DULOXETIN HYDROCHLORIDE 30 MG/1
30 CAPSULE, DELAYED RELEASE ORAL DAILY
Qty: 90 CAPSULE | Refills: 0 | Status: SHIPPED | OUTPATIENT
Start: 2024-09-05 | End: 2024-12-04

## 2024-09-05 ASSESSMENT — ENCOUNTER SYMPTOMS
NAUSEA: 0
CHEST TIGHTNESS: 0
PALPITATIONS: 0
UNEXPECTED WEIGHT CHANGE: 0
DIARRHEA: 0
CHILLS: 0
ACTIVITY CHANGE: 0
SLEEP DISTURBANCE: 0
SHORTNESS OF BREATH: 0
PAIN: 1
FATIGUE: 0
VOMITING: 0
VOICE CHANGE: 0
COUGH: 0
SORE THROAT: 0
FEVER: 0
NECK PAIN: 1

## 2024-09-05 ASSESSMENT — PAIN - FUNCTIONAL ASSESSMENT
PAIN_FUNCTIONAL_ASSESSMENT: 0-10
PAIN_FUNCTIONAL_ASSESSMENT: 0-10

## 2024-09-05 ASSESSMENT — LIFESTYLE VARIABLES
HOW MANY STANDARD DRINKS CONTAINING ALCOHOL DO YOU HAVE ON A TYPICAL DAY: PATIENT DOES NOT DRINK
HOW OFTEN DO YOU HAVE SIX OR MORE DRINKS ON ONE OCCASION: NEVER
HOW OFTEN DO YOU HAVE A DRINK CONTAINING ALCOHOL: NEVER
SKIP TO QUESTIONS 9-10: 1
AUDIT-C TOTAL SCORE: 0

## 2024-09-05 ASSESSMENT — PAIN SCALES - GENERAL
PAINLEVEL: 6
PAINLEVEL_OUTOF10: 6
PAINLEVEL_OUTOF10: 0 - NO PAIN

## 2024-09-05 NOTE — PROGRESS NOTES
Subjective   Patient ID: Pauline Orta is a 74 y.o. female who presents for Pain.  Patient is a 74-year-old female with cervical postlaminectomy syndrome fibromyalgia cervical radiculopathy right shoulder pain the presents today for follow-up.  Patient recently had an orthopedic doctor go BZ injection in the shoulder that provided some relief but is worn off.  She is concerned that he is going to require surgical procedure.  Patient denotes that her February cervical epidural injection started wearing off in July.  She has now been having increased neck range of motion pain and radiating into the bilateral shoulders.  She does do note that there has been some intermittent radiation greater on the right than the left.  And this has been in the upper segment of the extremity.    Pain  Pertinent negatives include no chest pain, diarrhea, fatigue, fever, nausea, shortness of breath or vomiting.       Review of Systems   Constitutional:  Negative for activity change, chills, fatigue, fever and unexpected weight change.   HENT:  Negative for ear pain, sore throat and voice change.    Eyes:  Negative for visual disturbance.   Respiratory:  Negative for cough, chest tightness and shortness of breath.    Cardiovascular:  Negative for chest pain and palpitations.   Gastrointestinal:  Negative for diarrhea, nausea and vomiting.   Musculoskeletal:  Positive for neck pain.   Psychiatric/Behavioral:  Negative for behavioral problems, self-injury, sleep disturbance and suicidal ideas.        Objective   Physical Exam  Vitals reviewed.   Constitutional:       Appearance: Normal appearance.   HENT:      Head: Normocephalic and atraumatic.      Mouth/Throat:      Mouth: Mucous membranes are moist.   Neck:      Vascular: No JVD.   Pulmonary:      Effort: Pulmonary effort is normal. No tachypnea or bradypnea.   Abdominal:      Palpations: Abdomen is soft.   Musculoskeletal:      Cervical back: Spasms and tenderness present.  Normal range of motion.        Back:       Comments: Positive facet loading   Feet:      Comments: Left foot boot.   Skin:     General: Skin is warm and dry.   Neurological:      Mental Status: She is alert and oriented to person, place, and time.   Psychiatric:         Mood and Affect: Mood normal.         Behavior: Behavior normal. Behavior is cooperative.         Assessment/Plan   Problem List Items Addressed This Visit             ICD-10-CM    Chronic pain G89.29    Relevant Medications    tiZANidine (Zanaflex) 4 mg tablet    Postlaminectomy syndrome, cervical region - Primary M96.1    Relevant Orders    Epidural Steroid Injection    FL pain management    Fibromyalgia M79.7    Relevant Medications    DULoxetine (Cymbalta) 30 mg DR capsule     Other Visit Diagnoses         Codes    Cervical spondylosis without myelopathy     M47.812    Relevant Medications    gabapentin (Neurontin) 800 mg tablet    Chronic right shoulder pain     M25.511, G89.29    Relevant Medications    DULoxetine (Cymbalta) 30 mg DR capsule          I had nice discussion with the patient today our plan will be as follows.      Radiology: [ none at this time ]      Physically:  [ continue modification of activities, healthy lifestyle choice ]      Psychologically:  [ No acute psychological concerns. There are no mental health issues of which I am aware that are contributing to the patient's pain. There are no substance abuse or alcohol abuse issues of which I am aware that are contributing to the patient's pain.  ]      Medication: [I will refill the medications at the same dose and frequency. We will continue to monitor the patient every 3 months for compliance, adverse reaction or interactions The patient continues to see benefit and improvement in their quality of life and ability to maintain ADLs. Patient educated about the risks of taking opioids and operating a motor vehicle. Patient reports no adverse side effects to current medication  regimen. Current regimen does allow patient to maintain ADLs. Oarrs has been reviewed. No suspicion of diversion or abuse. Compliance with medication regime, no use of illicit drugs, no sharing of narcotic medications with others, do not use others narcotic medication, and to avoid alcohol use. Patient has been educated on the risks, benefits, and alternatives of controlled substances as well as the proper way to store these medications.   The patient and I discussed the nature of this medication and its side effects. We discussed tolerance, physical dependence, psychological dependence, addiction and opioid-induced hyperalgesia ]      Duration:  [ 3 months ]      Intervention:  [  I do believe that there is some cross over from the right shoulder and some of the radiating pain and the cervical epidural injection.  With greater than 90% relief for 3 months and sustained relief for 4 months I think it would be prudent to repeat this procedure.  Therefore ordering a cervical epidural injection at the C6-7 level local sedation fluoroscopy guidance with Dr. MATUTE.  Patient verbalized understanding and would like to proceed.  I believe this will help relieve to some of the radicular component and allow the orthopedist to make it better evaluation of patient's shoulder pathologies ]         Michael Carrera PA-C 09/05/24 11:10 AM

## 2024-09-05 NOTE — PROGRESS NOTES
"Physical Therapy Treatment    Patient Name: Pauline Orta  MRN: [id number]  Today's Date: 9/5/2024  Time Calculation  Start Time: 1246  Stop Time: 1330  Time Calculation (min): 44 min  PT Therapeutic Procedures Time Entry  Neuromuscular Re-Education Time Entry: 24  Therapeutic Exercise Time Entry: 20    Insurance:  Visit number: 4 of 11  Authorization info[place]   Insurance Type: Payor: AET MEDICARE / Plan: AETNA MEDICARE ASSURE / Product Type: *No Product type* /     Current Problem   1. Left foot pain        2. Pain, foot  Follow Up In Physical Therapy          Subjective   General   General Comment: Pt does not report any pain will occasionally feel like it gives out but she did do a lot of walkign 3 days ago without any issue.  Precautions:  Precautions  Precautions Comment: None  Pain   Pain Assessment: 0-10  0-10 (Numeric) Pain Score: 0 - No pain  Post Treatment Pain Level 0    Objective   Large wobble during balance tasks but no sway or LOB     Treatments:  Therapeutic Exercise:  Therapeutic Exercise  Therapeutic Exercise Performed: Yes  Therapeutic Exercise Activity 1: BAPS board AP 30x  Therapeutic Exercise Activity 2: BAPS board CW 30x  Therapeutic Exercise Activity 3: seated heel to toe 30x  Therapeutic Exercise Activity 4: step up 4\" box 10x3    Neuro Re-ed:   Balance/Neuromuscular Re-Education  Balance/Neuromuscular Re-Education Activity Performed: Yes  Balance/Neuromuscular Re-Education Activity 1: foam ws fwd/bwd 30x3 UE PRN  Balance/Neuromuscular Re-Education Activity 2: foam romberg EC 30\"x3 no UE  Balance/Neuromuscular Re-Education Activity 3: semi-tandem firm surface 30\"x3 R/L  Balance/Neuromuscular Re-Education Activity 4: rocker board neutral control 30\"x2 no UE      Assessment   Assessment:   PT Assessment  Assessment Comment: Pt tolerated session well focus on ankle mobility and balance tasks without any LOB or increase in pain however high fear of falling remains. Will " conitnue with progression of strengthening and balance.    Plan:    Continue progression of balance tasks     OP EDUCATION:   Continue with established HEP     Goals:

## 2024-09-11 ENCOUNTER — TREATMENT (OUTPATIENT)
Dept: PHYSICAL THERAPY | Facility: CLINIC | Age: 74
End: 2024-09-11
Payer: MEDICARE

## 2024-09-11 DIAGNOSIS — M79.672 LEFT FOOT PAIN: Primary | ICD-10-CM

## 2024-09-11 DIAGNOSIS — M79.673 PAIN, FOOT: ICD-10-CM

## 2024-09-11 PROCEDURE — 97110 THERAPEUTIC EXERCISES: CPT | Mod: GP | Performed by: PHYSICAL THERAPIST

## 2024-09-11 PROCEDURE — 97112 NEUROMUSCULAR REEDUCATION: CPT | Mod: GP | Performed by: PHYSICAL THERAPIST

## 2024-09-11 ASSESSMENT — PAIN SCALES - GENERAL: PAINLEVEL_OUTOF10: 0 - NO PAIN

## 2024-09-11 ASSESSMENT — PAIN - FUNCTIONAL ASSESSMENT: PAIN_FUNCTIONAL_ASSESSMENT: 0-10

## 2024-09-11 NOTE — PROGRESS NOTES
"Physical Therapy Treatment    Patient Name: Pauline Orta  MRN: [id number]  Today's Date: 9/11/2024  Time Calculation  Start Time: 1313  Stop Time: 1351  Time Calculation (min): 38 min  PT Therapeutic Procedures Time Entry  Neuromuscular Re-Education Time Entry: 23  Therapeutic Exercise Time Entry: 15    Insurance:  Visit number: 5 of 11  Authorization info: [place]   Insurance Type: Payor: AETNA MEDICARE / Plan: AETNA MEDICARE ASSURE / Product Type: *No Product type* /     Current Problem   1. Left foot pain        2. Pain, foot  Follow Up In Physical Therapy          Subjective   General   General Comment: Pt does not report any large complaints, had one instance of L heel pain which was shooting in nature up leg however it was quick to resolve and haven't happened since.  Precautions:  Precautions  Precautions Comment: None  Pain   Pain Assessment: 0-10  0-10 (Numeric) Pain Score: 0 - No pain  Post Treatment Pain Level 0    Objective   No LOB on airex pad, high fear     Treatments:  Therapeutic Exercise:  Therapeutic Exercise  Therapeutic Exercise Performed: Yes  Therapeutic Exercise Activity 1: NuStep L3 5' no UE  Therapeutic Exercise Activity 2: toe curl 5\" H 10x2  Therapeutic Exercise Activity 3: toe extension 10x3  Therapeutic Exercise Activity 4: seated DF 10\"H 10x  Therapeutic Exercise Activity 5: seated ankle inv/evr 10x3  Therapeutic Exercise Activity 6: gastroc stretch on wedge 30\"x3    Neuro Re-ed:   Balance/Neuromuscular Re-Education  Balance/Neuromuscular Re-Education Activity Performed: Yes  Balance/Neuromuscular Re-Education Activity 1: foam romberg EC 30\"x3 no UE  Balance/Neuromuscular Re-Education Activity 2: foam normal stance EO with reach 10x2  Balance/Neuromuscular Re-Education Activity 3: foam normal stance with B arm raise 10x2      Assessment   Assessment:   PT Assessment  Assessment Comment: Pt tolerated session well focus on intrinsic foot muscle strength and progression of " balance continues to be highly cautious iwth balance hwoever no LOB and good contorl ntoed this date.    Plan:    Continue with balance progression     OP EDUCATION:   Continue with established HEP     Goals:     see jorjeal

## 2024-09-12 ENCOUNTER — APPOINTMENT (OUTPATIENT)
Dept: ORTHOPEDIC SURGERY | Facility: HOSPITAL | Age: 74
End: 2024-09-12
Payer: MEDICARE

## 2024-09-13 ENCOUNTER — TELEPHONE (OUTPATIENT)
Dept: PAIN MEDICINE | Facility: CLINIC | Age: 74
End: 2024-09-13
Payer: COMMERCIAL

## 2024-09-17 ENCOUNTER — APPOINTMENT (OUTPATIENT)
Dept: PHYSICAL THERAPY | Facility: CLINIC | Age: 74
End: 2024-09-17
Payer: MEDICARE

## 2024-09-19 ENCOUNTER — TREATMENT (OUTPATIENT)
Dept: PHYSICAL THERAPY | Facility: CLINIC | Age: 74
End: 2024-09-19
Payer: MEDICARE

## 2024-09-19 DIAGNOSIS — M79.673 PAIN, FOOT: ICD-10-CM

## 2024-09-19 DIAGNOSIS — M79.672 LEFT FOOT PAIN: Primary | ICD-10-CM

## 2024-09-19 PROCEDURE — 97112 NEUROMUSCULAR REEDUCATION: CPT | Mod: GP | Performed by: PHYSICAL THERAPIST

## 2024-09-19 NOTE — PROGRESS NOTES
"Physical Therapy Treatment    Patient Name: Pauline Orta  MRN: [id number]  Today's Date: 9/19/2024  Time Calculation  Start Time: 1337  Stop Time: 1415  Time Calculation (min): 38 min  PT Therapeutic Procedures Time Entry  Neuromuscular Re-Education Time Entry: 38    Insurance:  Visit number: 6 of 11  Authorization info: [place], [place]   Insurance Type: Payor: AETNA MEDICARE / Plan: AETNA MEDICARE ASSURE / Product Type: *No Product type* /     Current Problem   1. Left foot pain        2. Pain, foot  Follow Up In Physical Therapy          Subjective   General   General Comment: Pt reports her ankle is feeling good. She was at her club pushing the chairs  and walking around without problem.  Precautions:  Precautions  Precautions Comment: None  Pain    0  Post Treatment Pain Level 0    Objective   Good balance without any LOB    Treatments:    Neuro Re-ed:   Balance/Neuromuscular Re-Education  Balance/Neuromuscular Re-Education Activity Performed: Yes  Balance/Neuromuscular Re-Education Activity 1: NuStep L5 5'  Balance/Neuromuscular Re-Education Activity 2: fwd/bwd ambulation 5 laps  Balance/Neuromuscular Re-Education Activity 3: keny-cross ambualtion 5 laps  Balance/Neuromuscular Re-Education Activity 4: romberg firm EC 30\"x3  Balance/Neuromuscular Re-Education Activity 5: semi-tandem firm surface 30\"x3 R/L  Balance/Neuromuscular Re-Education Activity 6: semi-tandem with head turns 10x3      Assessment   Assessment:   PT Assessment  Assessment Comment: Pt tolerated session well, continue with progress of balance tasks. Will perform reassessment next session.    Plan:    Re-assess next session     OP EDUCATION:   Continue with established HEP     Goals:       "

## 2024-09-24 ENCOUNTER — APPOINTMENT (OUTPATIENT)
Dept: PHYSICAL THERAPY | Facility: CLINIC | Age: 74
End: 2024-09-24
Payer: MEDICARE

## 2024-09-24 ENCOUNTER — TELEPHONE (OUTPATIENT)
Dept: PAIN MEDICINE | Facility: CLINIC | Age: 74
End: 2024-09-24
Payer: COMMERCIAL

## 2024-09-24 DIAGNOSIS — F41.9 ANXIETY: ICD-10-CM

## 2024-09-24 NOTE — TELEPHONE ENCOUNTER
----- Message from Milagro ARAUZ sent at 9/19/2024  5:04 PM EDT -----  APPROVED OK TO SCHEDULE WEST- I MAY HAVE SENT THIS ALREADY..

## 2024-09-25 RX ORDER — SERTRALINE HYDROCHLORIDE 50 MG/1
50 TABLET, FILM COATED ORAL DAILY
Qty: 90 TABLET | Refills: 0 | Status: SHIPPED | OUTPATIENT
Start: 2024-09-25

## 2024-09-26 ENCOUNTER — DOCUMENTATION (OUTPATIENT)
Dept: PHYSICAL THERAPY | Facility: CLINIC | Age: 74
End: 2024-09-26
Payer: COMMERCIAL

## 2024-09-26 ENCOUNTER — APPOINTMENT (OUTPATIENT)
Dept: PHYSICAL THERAPY | Facility: CLINIC | Age: 74
End: 2024-09-26
Payer: MEDICARE

## 2024-09-26 NOTE — PROGRESS NOTES
Physical Therapy                 Therapy Communication Note    Patient Name: Pauline Orta  MRN: [id number]  Department:   Room: Room/bed info not found  Today's Date: 9/26/2024     Discipline: Physical Therapy    Missed Visit Reason:      Missed Time: Cancel    Comment: Pt called left message stating issue at home unable to attend.

## 2024-10-11 ENCOUNTER — TREATMENT (OUTPATIENT)
Dept: PHYSICAL THERAPY | Facility: CLINIC | Age: 74
End: 2024-10-11
Payer: MEDICARE

## 2024-10-11 DIAGNOSIS — M79.672 LEFT FOOT PAIN: Primary | ICD-10-CM

## 2024-10-11 DIAGNOSIS — M79.673 PAIN, FOOT: ICD-10-CM

## 2024-10-11 PROCEDURE — 97140 MANUAL THERAPY 1/> REGIONS: CPT | Mod: GP | Performed by: PHYSICAL THERAPIST

## 2024-10-11 ASSESSMENT — PAIN SCALES - GENERAL: PAINLEVEL_OUTOF10: 5 - MODERATE PAIN

## 2024-10-11 ASSESSMENT — PAIN - FUNCTIONAL ASSESSMENT: PAIN_FUNCTIONAL_ASSESSMENT: 0-10

## 2024-10-11 NOTE — PROGRESS NOTES
Physical Therapy Progress Note/Treatment    Patient Name: Pauline Orta  MRN: [id number]  Today's Date: 10/11/2024  Time Calculation  Start Time: 1035  Stop Time: 1120  Time Calculation (min): 45 min  PT Therapeutic Procedures Time Entry  Manual Therapy Time Entry: 13  Therapeutic Activity Time Entry: 12    Insurance:  Visit number: 7 of 11  Authorization info: [place], [place]   Insurance Type: Payor: AETNA MEDICARE / Plan: AETNA MEDICARE ASSURE / Product Type: *No Product type* /     Current Problem   1. Left foot pain        2. Pain, foot  Follow Up In Physical Therapy          Subjective   General   General Comment: Pt reporting increase in foot pain unsue what caused the pain.  Precautions:  Precautions  Precautions Comment: None  Pain   Pain Assessment: 0-10  0-10 (Numeric) Pain Score: 5 - Moderate pain  Post Treatment Pain Level 3    Objective   LEFS 48    ANKLE  Observation  Observation Comment: Reduced R big toe mobiltiy into PF  Ankle Palpation/Joint Mobility Assessment  Palpation/Joint Mobility Comment: discomfort along dorsal foot  Ankle AROM  L ankle dorsiflexion: (10°): 5  L ankle plantarflexion: (40°): WNL  L ankle inversion: (30°): WNL  L ankle eversion: (20°): 5    Ankle MMT  L ankle dorsiflexion: (5/5): 5  L ankle plantarflexion: (5/5): 5  L ankle inversion: (5/5): 4+  L ankle eversion: (5/5): 4    Joint Mobility Testing  Joint Mobility Comment: grossly WNL following post-op status  Gait   Antalgic      Treatments:    Therapeutic activity:  Therapeutic Activity  Therapeutic Activity Performed: Yes  Therapeutic Activity 1: Reassessment performed    Manual:  Manual Therapy  Manual Therapy Performed: Yes  Manual Therapy Activity 1: STM along lateral malleoli    Modalities  Light Therapy: Infrared/Red wave length; 10J/cm2 applied with pads; applied to L ankle, in Seated     Assessment   Assessment:   PT Assessment  Assessment Comment: Pt was making good progress however entered therapy this date  with new onset of ATFL tenderness with potential sprain however ROM and strength continue to be improved, at this time pt would benefit from continuation of POC to help with ambulation and balance.    Plan:    Assess effects of light therapy     OP EDUCATION:   Use of heat    Goals:    Other Measures  Lower Extremity Funtional Score (LEFS): 48

## 2024-10-14 ENCOUNTER — OFFICE VISIT (OUTPATIENT)
Dept: URGENT CARE | Age: 74
End: 2024-10-14
Payer: MEDICARE

## 2024-10-14 VITALS
RESPIRATION RATE: 20 BRPM | WEIGHT: 149 LBS | TEMPERATURE: 98.5 F | BODY MASS INDEX: 26.4 KG/M2 | HEART RATE: 100 BPM | DIASTOLIC BLOOD PRESSURE: 83 MMHG | OXYGEN SATURATION: 96 % | HEIGHT: 63 IN | SYSTOLIC BLOOD PRESSURE: 157 MMHG

## 2024-10-14 DIAGNOSIS — S16.1XXA STRAIN OF STERNOCLEIDOMASTOID MUSCLE, INITIAL ENCOUNTER: Primary | ICD-10-CM

## 2024-10-14 PROCEDURE — 99213 OFFICE O/P EST LOW 20 MIN: CPT | Performed by: PHYSICIAN ASSISTANT

## 2024-10-14 PROCEDURE — 1159F MED LIST DOCD IN RCRD: CPT | Performed by: PHYSICIAN ASSISTANT

## 2024-10-14 PROCEDURE — 1125F AMNT PAIN NOTED PAIN PRSNT: CPT | Performed by: PHYSICIAN ASSISTANT

## 2024-10-14 RX ORDER — METHOCARBAMOL 500 MG/1
1000 TABLET, FILM COATED ORAL NIGHTLY
Qty: 20 TABLET | Refills: 0 | Status: SHIPPED | OUTPATIENT
Start: 2024-10-14 | End: 2024-12-13

## 2024-10-14 ASSESSMENT — ENCOUNTER SYMPTOMS: NECK PAIN: 1

## 2024-10-14 ASSESSMENT — PAIN SCALES - GENERAL: PAINLEVEL: 10-WORST PAIN EVER

## 2024-10-14 NOTE — PROGRESS NOTES
Subjective   Patient ID: Pauline Orta is a 74 y.o. female. They present today with a chief complaint of Neck Pain (X 2 days, no known injuries.).    History of Present Illness  Patient is a very pleasant 74-year-old white female, past medical history of anxiety, depression, GERD clinic with chief complaint of neck pain.  Patient is reporting 2 to 3-day history of pain to the left side of her neck just behind her left ear.  She denies any numbness, tingling, or focal weakness.  Denies any fall trauma or injury.  She has been applying heat at home with minimal relief.  She has been using Tylenol as well also with minimal relief.  She denies any other pain.  No chest pain or shortness of breath.  No headache lightheadedness or dizziness.      Neck Pain      Past Medical History  Allergies as of 10/14/2024 - Reviewed 10/14/2024   Allergen Reaction Noted    Aspirin Other 09/27/2023    Iodinated contrast media Hives 10/23/2023    Lithium Unknown 09/27/2023    Shellfish derived Angioedema 10/23/2023    Adhesive Other 09/27/2023    Caffeine Insomnia 09/27/2023       (Not in a hospital admission)         Past Medical History:   Diagnosis Date    Adverse effect of anesthesia     woke during anesthesia    Anxiety     Arthritis     Cervical disc disease     Chronic pain disorder     Fibromyalgia, primary     GERD (gastroesophageal reflux disease)     Joint pain     Low back pain     Lumbosacral disc disease     Migraine     Neck pain     Osteoarthritis     Peripheral neuropathy     Spinal stenosis        Past Surgical History:   Procedure Laterality Date    CARPAL TUNNEL RELEASE Right     CATARACT EXTRACTION      CERVICAL FUSION      CERVICAL FUSION      GASTRIC BYPASS      HYSTERECTOMY      partial    JOINT REPLACEMENT      LAMINECTOMY      MR HEAD ANGIO WO IV CONTRAST  10/14/2019    MR HEAD ANGIO WO IV CONTRAST [facility]    NECK SURGERY      ORTHOPEDIC SURGERY      SPINAL FUSION      TRIGGER FINGER RELEASE  "Right         reports that she quit smoking about 34 years ago. Her smoking use included cigarettes. She started smoking about 44 years ago. She has a 2.5 pack-year smoking history. She has never used smokeless tobacco. She reports that she does not currently use alcohol. She reports that she does not use drugs.    Review of Systems  Review of Systems   Musculoskeletal:  Positive for neck pain.          All review of systems negative unless stated in HPI.                         Objective    Vitals:    10/14/24 1339   BP: 157/83   BP Location: Left arm   Patient Position: Sitting   BP Cuff Size: Adult   Pulse: 100   Resp: 20   Temp: 36.9 °C (98.5 °F)   TempSrc: Oral   SpO2: 96%   Weight: 67.6 kg (149 lb)   Height: 1.6 m (5' 3\")     No LMP recorded. Patient has had a hysterectomy.    Physical Exam  General: Vitals Noted. No distress. Normocephalic.     HEENT: TMs normal, EOMI, normal conjunctiva, patent nares, Normal OP    Neck: Supple with no adenopathy.     Cardiac: Regular Rate and Rhythm. No murmur.     Pulmonary: Equal breath sounds bilaterally. No wheezes, rhonchi, or rales.    Abdomen: Soft, non-tender, with normal bowel sounds.     Musculoskeletal: There is tenderness to palpation with palpable hypertonicity over the proximal sternocleidomastoid and insertion of the mastoid.  Mastoid is nontender.  There is increased pain with both lateral flexion and rotation of the neck.  Neurovascular intact distally with cap refill less than 2 seconds full strength and sensation throughout upper extremities.  Moves all extremities, no effusion, no edema.     Skin: No obvious rashes.  Procedures    Point of Care Test & Imaging Results from this visit    No results found.    Diagnostic study results (if any) were reviewed by Michael Root PA-C.    Assessment/Plan   Allergies, medications, history, and pertinent labs/EKGs/Imaging reviewed by Michael Root PA-C.     Medical Decision Making  Patient was seen eval " in the clinic for chief complaint of neck pain.  On exam patient is nontoxic very well-appearing resting bed comfortably no acute distress.  Vital signs are stable, afebrile.  Chest is clear, heart is regular, belly is diffusely soft and nontender.  Evaluation of neck as above consistent with a strain/spasm of the left sternocleidomastoid muscle at the insertion in the mastoid.  Advised the patient to discontinue reviewed at home and apply ice is much as tolerated.  Advise she continue Tylenol as needed.  Will provide methocarbamol to be used nightly as needed.  Also advised gentle stretching exercises for the neck.  We discharged home at this time discharged to follow-up with primary care physician in the next week.  Reviewed my impression, plan, strict return precautions with the patient.  She expresses understanding and agreement plan of care.    Orders and Diagnoses  Diagnoses and all orders for this visit:  Strain of sternocleidomastoid muscle, initial encounter  -     methocarbamol (Robaxin) 500 mg tablet; Take 2 tablets (1,000 mg) by mouth once daily at bedtime.        Medical Admin Record      Follow Up Instructions  No follow-ups on file.    Patient disposition: Home    Electronically signed by Michael Root PA-C  1:52 PM

## 2024-10-15 ENCOUNTER — TREATMENT (OUTPATIENT)
Dept: PHYSICAL THERAPY | Facility: CLINIC | Age: 74
End: 2024-10-15
Payer: MEDICARE

## 2024-10-15 DIAGNOSIS — M79.672 LEFT FOOT PAIN: Primary | ICD-10-CM

## 2024-10-15 DIAGNOSIS — M79.673 PAIN, FOOT: ICD-10-CM

## 2024-10-15 PROCEDURE — 97110 THERAPEUTIC EXERCISES: CPT | Mod: GP,CQ

## 2024-10-15 ASSESSMENT — PAIN - FUNCTIONAL ASSESSMENT: PAIN_FUNCTIONAL_ASSESSMENT: 0-10

## 2024-10-15 ASSESSMENT — PAIN SCALES - GENERAL: PAINLEVEL_OUTOF10: 6

## 2024-10-15 NOTE — PROGRESS NOTES
"Physical Therapy Treatment    Patient Name: Pauline Orta  MRN: [id number]  Today's Date: 10/15/2024  Time Calculation  Start Time: 1300  Stop Time: 1326  Time Calculation (min): 26 min  PT Therapeutic Procedures Time Entry  Therapeutic Exercise Time Entry: 26    Insurance:  Visit number: 9 of 11  Authorization info: [place] Visits, No Auth   Insurance Type: Payor: T MEDICARE / Plan: [employer] MEDICARE ASSURE / Product Type: *No Product type* /     Current Problem   1. Left foot pain        2. Pain, foot  Follow Up In Physical Therapy          Subjective   General    Patient reports she has a lot of increased pain in L ankle after sprain as well as exacerbation of neck pain which she went to urgent care for yesterday. States the weather makes her feel a lot worse.     Precautions:   none    Pain   Pain Assessment: 0-10  0-10 (Numeric) Pain Score: 6  Pain Type: Acute pain  Pain Location: Ankle    Post Treatment Pain Level   No change    Objective   Pain with eccentric gastroc control    Treatments:  Therapeutic Exercise:  Therapeutic Exercise  Therapeutic Exercise Performed: Yes  Therapeutic Exercise Activity 1: NuStep L3 5' no UE  Therapeutic Exercise Activity 2: K tape applied to L ATFL  Therapeutic Exercise Activity 3: calf raises with WS eccentric lowering x10 LLE  Therapeutic Exercise Activity 4: anchored squats >heel raise x10  Therapeutic Exercise Activity 5: runners gastroc stretch 3 x 20\" LLE      Assessment   Assessment:    Short session this date d/t patient pain in neck and in ankle. Stated after tape that she felt added support. Pain with eccentric gastroc control but no pain with concentric contraction. Will attempt further strength/stability next session as appropriate and as tolerated.     Plan:    See above    OP EDUCATION:   Tape management, ice ankle d/t tape, rest, pain free HEP    Goals:   SHORT TERM GOALS:  Patient will report decrease in pain from **/10 to </= **/10 to improve quality of life. "   Patient will improve ankle AROM to WFL in order to improve gait mechanics and functional mobility  Patient will demonstrate sit to stand x10 without UE to demonstrate improved LE strength.  Patient will improve 50% score to </= 10 seconds to reduce fall risk.   Patient independent with prescribed HEP  Pt Education - Independent postural and  awareness and joint protection program  LONG TERM GOALS:  Patient will be independent with HEP to promote self management of condition  Patient will perform reciprocal stair negotiation to demonstrate improved LE strength.   Patient will ambulate with least restrictive device and proper gait mechanics to promote return to prior level of function.    Functional Level - reported % (hobbies/work/recreation)

## 2024-10-21 ENCOUNTER — TREATMENT (OUTPATIENT)
Dept: PHYSICAL THERAPY | Facility: CLINIC | Age: 74
End: 2024-10-21
Payer: MEDICARE

## 2024-10-21 DIAGNOSIS — M79.672 LEFT FOOT PAIN: Primary | ICD-10-CM

## 2024-10-21 DIAGNOSIS — M79.673 PAIN, FOOT: ICD-10-CM

## 2024-10-21 PROCEDURE — 97530 THERAPEUTIC ACTIVITIES: CPT | Mod: GP | Performed by: PHYSICAL THERAPIST

## 2024-10-21 ASSESSMENT — PAIN - FUNCTIONAL ASSESSMENT: PAIN_FUNCTIONAL_ASSESSMENT: 0-10

## 2024-10-21 ASSESSMENT — PAIN SCALES - GENERAL: PAINLEVEL_OUTOF10: 1

## 2024-10-21 NOTE — PROGRESS NOTES
Physical Therapy Discharge/Treatment    Patient Name: Pauline Orta  MRN: [id number]  Today's Date: 10/21/2024  Time Calculation  Start Time: 1335  Stop Time: 1415  Time Calculation (min): 40 min  PT Therapeutic Procedures Time Entry  Therapeutic Activity Time Entry: 40    Insurance:  Visit number: 10 of 11  Authorization info: [place] Visits, No Auth   Insurance Type: Payor: [employer] MEDICARE / Plan: AET[employer] MEDICARE ASSURE / Product Type: *No Product type* /     Current Problem   1. Left foot pain        2. Pain, foot  Follow Up In Physical Therapy          Subjective   General   General Comment: Pt no longer reporting discomfort from previous sprain, states overall things are going well. Feels that her current level is her new normal.  Precautions:  Precautions  Precautions Comment: None  Pain   Pain Assessment: 0-10  0-10 (Numeric) Pain Score: 1  Post Treatment Pain Level 0    Objective   L ROM and strength WFL  Gait: normalized  LEFS: 45    Treatments:    Therapeutic activity:  Therapeutic Activity  Therapeutic Activity Performed: Yes  Therapeutic Activity 1: Reassessment and education on continued limitations  Therapeutic Activity 2: Review of HEP  Therapeutic Activity 3: Application of KT tape      Assessment   Assessment:   PT Assessment  Assessment Comment: Pt has acheived return to prior function before most recent sprain. Pt and PT discussed and at this time pt feels good to go IND with HEP. Review and assessment was performed this date. Discharge at this time.    Plan:    Discharge     OP EDUCATION:   Continue with established HEP     Goals:     All met

## 2024-10-30 ENCOUNTER — APPOINTMENT (OUTPATIENT)
Dept: PHYSICAL THERAPY | Facility: CLINIC | Age: 74
End: 2024-10-30
Payer: MEDICARE

## 2024-10-31 ENCOUNTER — OFFICE VISIT (OUTPATIENT)
Dept: PRIMARY CARE | Facility: CLINIC | Age: 74
End: 2024-10-31
Payer: MEDICARE

## 2024-10-31 VITALS
DIASTOLIC BLOOD PRESSURE: 76 MMHG | HEIGHT: 63 IN | HEART RATE: 108 BPM | OXYGEN SATURATION: 93 % | SYSTOLIC BLOOD PRESSURE: 114 MMHG | BODY MASS INDEX: 26.33 KG/M2 | TEMPERATURE: 96.5 F | WEIGHT: 148.6 LBS

## 2024-10-31 DIAGNOSIS — F10.21 ALCOHOL USE DISORDER, SEVERE, IN SUSTAINED REMISSION (MULTI): ICD-10-CM

## 2024-10-31 DIAGNOSIS — G43.909 MIGRAINE WITHOUT STATUS MIGRAINOSUS, NOT INTRACTABLE, UNSPECIFIED MIGRAINE TYPE: Primary | ICD-10-CM

## 2024-10-31 DIAGNOSIS — F41.9 ANXIETY: Primary | ICD-10-CM

## 2024-10-31 PROCEDURE — 1158F ADVNC CARE PLAN TLK DOCD: CPT | Performed by: FAMILY MEDICINE

## 2024-10-31 PROCEDURE — 1123F ACP DISCUSS/DSCN MKR DOCD: CPT | Performed by: FAMILY MEDICINE

## 2024-10-31 PROCEDURE — 99214 OFFICE O/P EST MOD 30 MIN: CPT | Performed by: FAMILY MEDICINE

## 2024-10-31 PROCEDURE — 1036F TOBACCO NON-USER: CPT | Performed by: FAMILY MEDICINE

## 2024-10-31 PROCEDURE — 1125F AMNT PAIN NOTED PAIN PRSNT: CPT | Performed by: FAMILY MEDICINE

## 2024-10-31 PROCEDURE — 1159F MED LIST DOCD IN RCRD: CPT | Performed by: FAMILY MEDICINE

## 2024-10-31 PROCEDURE — 1160F RVW MEDS BY RX/DR IN RCRD: CPT | Performed by: FAMILY MEDICINE

## 2024-10-31 RX ORDER — SERTRALINE HYDROCHLORIDE 100 MG/1
100 TABLET, FILM COATED ORAL DAILY
Qty: 90 TABLET | Refills: 1 | Status: SHIPPED | OUTPATIENT
Start: 2024-10-31 | End: 2024-10-31 | Stop reason: WASHOUT

## 2024-10-31 RX ORDER — DULOXETIN HYDROCHLORIDE 60 MG/1
60 CAPSULE, DELAYED RELEASE ORAL DAILY
Qty: 90 CAPSULE | Refills: 1 | Status: SHIPPED | OUTPATIENT
Start: 2024-10-31 | End: 2025-10-31

## 2024-10-31 ASSESSMENT — PATIENT HEALTH QUESTIONNAIRE - PHQ9
2. FEELING DOWN, DEPRESSED OR HOPELESS: NOT AT ALL
SUM OF ALL RESPONSES TO PHQ9 QUESTIONS 1 AND 2: 0
1. LITTLE INTEREST OR PLEASURE IN DOING THINGS: NOT AT ALL

## 2024-10-31 ASSESSMENT — ENCOUNTER SYMPTOMS
OCCASIONAL FEELINGS OF UNSTEADINESS: 0
DEPRESSION: 0
LOSS OF SENSATION IN FEET: 0

## 2024-10-31 ASSESSMENT — LIFESTYLE VARIABLES: HOW MANY STANDARD DRINKS CONTAINING ALCOHOL DO YOU HAVE ON A TYPICAL DAY: PATIENT DOES NOT DRINK

## 2024-10-31 ASSESSMENT — PAIN SCALES - GENERAL: PAINLEVEL_OUTOF10: 6

## 2024-10-31 NOTE — TELEPHONE ENCOUNTER
PT SEEN TODAY REQUESTING PRESCRIPTION TOPIRAMATE 25 MG SENT TO [facility] STATES THIS ONE GOT SKIPPED AT HER APPOINTMENT TODAY THERE IS NO REFILLS STATES IT IS FOR MIGRAINE MAINTENANCE

## 2024-11-04 ENCOUNTER — LAB (OUTPATIENT)
Dept: LAB | Facility: LAB | Age: 74
End: 2024-11-04
Payer: MEDICARE

## 2024-11-04 RX ORDER — TOPIRAMATE 25 MG/1
25 TABLET ORAL DAILY
Qty: 90 TABLET | Refills: 1 | Status: SHIPPED | OUTPATIENT
Start: 2024-11-04 | End: 2025-11-04

## 2024-11-05 ENCOUNTER — TELEPHONE (OUTPATIENT)
Dept: PRIMARY CARE | Facility: CLINIC | Age: 74
End: 2024-11-05
Payer: MEDICARE

## 2024-11-05 DIAGNOSIS — Z00.00 ROUTINE ADULT HEALTH MAINTENANCE: Primary | ICD-10-CM

## 2024-11-05 NOTE — TELEPHONE ENCOUNTER
PT SEEN IN OFFICE LAST WEEK REQUESTING LAB ORDERS BE PLACED PT STATES SHE NEEDS BLOOD WORK DONE PRIOR TO SURGERY STATES DR RIOS THOUGHT THE ORDERS IN SYSTEM WERE STILL GOOD BUT THEY ARE

## 2024-11-07 ENCOUNTER — LAB (OUTPATIENT)
Dept: LAB | Facility: LAB | Age: 74
End: 2024-11-07
Payer: MEDICARE

## 2024-11-07 ENCOUNTER — APPOINTMENT (OUTPATIENT)
Dept: PHYSICAL THERAPY | Facility: CLINIC | Age: 74
End: 2024-11-07
Payer: MEDICARE

## 2024-11-07 DIAGNOSIS — Z00.00 ROUTINE ADULT HEALTH MAINTENANCE: ICD-10-CM

## 2024-11-07 LAB
ALBUMIN SERPL BCP-MCNC: 4.2 G/DL (ref 3.4–5)
ALP SERPL-CCNC: 82 U/L (ref 33–136)
ALT SERPL W P-5'-P-CCNC: 15 U/L (ref 7–45)
ANION GAP SERPL CALCULATED.3IONS-SCNC: 10 MMOL/L (ref 10–20)
AST SERPL W P-5'-P-CCNC: 20 U/L (ref 9–39)
BILIRUB SERPL-MCNC: 0.4 MG/DL (ref 0–1.2)
BUN SERPL-MCNC: 21 MG/DL (ref 6–23)
CALCIUM SERPL-MCNC: 9.5 MG/DL (ref 8.6–10.3)
CHLORIDE SERPL-SCNC: 105 MMOL/L (ref 98–107)
CHOLEST SERPL-MCNC: 217 MG/DL (ref 0–199)
CHOLEST/HDLC SERPL: 3.5 {RATIO}
CO2 SERPL-SCNC: 30 MMOL/L (ref 21–32)
CREAT SERPL-MCNC: 0.81 MG/DL (ref 0.5–1.05)
EGFRCR SERPLBLD CKD-EPI 2021: 75 ML/MIN/1.73M*2
EST. AVERAGE GLUCOSE BLD GHB EST-MCNC: 117 MG/DL
GLUCOSE SERPL-MCNC: 86 MG/DL (ref 74–99)
HBA1C MFR BLD: 5.7 %
HDLC SERPL-MCNC: 61.8 MG/DL
LDLC SERPL CALC-MCNC: 131 MG/DL
NON HDL CHOLESTEROL: 155 MG/DL (ref 0–149)
POTASSIUM SERPL-SCNC: 4.4 MMOL/L (ref 3.5–5.3)
PROT SERPL-MCNC: 7.1 G/DL (ref 6.4–8.2)
SODIUM SERPL-SCNC: 141 MMOL/L (ref 136–145)
TRIGL SERPL-MCNC: 122 MG/DL (ref 0–149)
TSH SERPL-ACNC: 1.41 MIU/L (ref 0.44–3.98)
VLDL: 24 MG/DL (ref 0–40)

## 2024-11-07 PROCEDURE — 36415 COLL VENOUS BLD VENIPUNCTURE: CPT

## 2024-11-07 PROCEDURE — 84443 ASSAY THYROID STIM HORMONE: CPT

## 2024-11-07 PROCEDURE — 83036 HEMOGLOBIN GLYCOSYLATED A1C: CPT

## 2024-11-07 PROCEDURE — 80053 COMPREHEN METABOLIC PANEL: CPT

## 2024-11-07 PROCEDURE — 80061 LIPID PANEL: CPT

## 2024-11-25 DIAGNOSIS — F41.9 ANXIETY: ICD-10-CM

## 2024-11-25 NOTE — TELEPHONE ENCOUNTER
Last refilled 7/18/24 for 90 days with one additional refill. Patient advised to call pharmacy  Last appmt 10/31/24 to establish acre

## 2024-11-26 ENCOUNTER — HOSPITAL ENCOUNTER (OUTPATIENT)
Dept: RADIOLOGY | Facility: CLINIC | Age: 74
Discharge: HOME | End: 2024-11-26
Payer: MEDICARE

## 2024-11-26 DIAGNOSIS — M97.31XA PERIPROSTHETIC FRACTURE AROUND INTERNAL PROSTHETIC RIGHT SHOULDER JOINT, INITIAL ENCOUNTER: ICD-10-CM

## 2024-11-26 PROCEDURE — 73200 CT UPPER EXTREMITY W/O DYE: CPT | Mod: RT

## 2024-11-26 PROCEDURE — 73200 CT UPPER EXTREMITY W/O DYE: CPT | Mod: RIGHT SIDE | Performed by: RADIOLOGY

## 2024-11-26 RX ORDER — HYDROXYZINE HYDROCHLORIDE 25 MG/1
25 TABLET, FILM COATED ORAL 3 TIMES DAILY
Qty: 90 TABLET | Refills: 1 | Status: SHIPPED | OUTPATIENT
Start: 2024-11-26

## 2024-12-05 ENCOUNTER — APPOINTMENT (OUTPATIENT)
Dept: PAIN MEDICINE | Facility: CLINIC | Age: 74
End: 2024-12-05
Payer: MEDICARE

## 2024-12-13 ENCOUNTER — EVALUATION (OUTPATIENT)
Dept: OCCUPATIONAL THERAPY | Facility: CLINIC | Age: 74
End: 2024-12-13
Payer: MEDICARE

## 2024-12-13 DIAGNOSIS — M18.11 UNILATERAL PRIMARY OSTEOARTHRITIS OF FIRST CARPOMETACARPAL JOINT, RIGHT HAND: Primary | ICD-10-CM

## 2024-12-13 PROCEDURE — 97165 OT EVAL LOW COMPLEX 30 MIN: CPT | Mod: GO | Performed by: OCCUPATIONAL THERAPIST

## 2024-12-13 PROCEDURE — 97110 THERAPEUTIC EXERCISES: CPT | Mod: GO | Performed by: OCCUPATIONAL THERAPIST

## 2024-12-13 ASSESSMENT — PAIN - FUNCTIONAL ASSESSMENT: PAIN_FUNCTIONAL_ASSESSMENT: 0-10

## 2024-12-13 ASSESSMENT — PAIN DESCRIPTION - DESCRIPTORS: DESCRIPTORS: ACHING;RADIATING

## 2024-12-13 ASSESSMENT — PAIN SCALES - GENERAL: PAINLEVEL_OUTOF10: 4

## 2024-12-13 NOTE — PROGRESS NOTES
Occupational Therapy    Evaluation/Treatment    Patient Name: Pauline Orta  MRN: [id number]  OT Received on: 12/13/2024    Time Calculation  Start Time: 1346  Stop Time: 1429  Time Calculation (min): 43 min  OT Evaluation Time Entry  OT Evaluation (Low) Time Entry: 20  OT Therapeutic Procedures Time Entry  Therapeutic Exercise Time Entry: 23    Visit # 1 of 6  Visits/Dates Authorized: no auth needed    Current Problem:   Problem List Items Addressed This Visit             ICD-10-CM    Unilateral primary osteoarthritis of first carpometacarpal joint, right hand - Primary M18.11    Relevant Orders    Follow Up In Occupational Therapy     Insurance Type: Payor: [facility] MEDICARE / Plan: AETNA MEDICARE ASSURE / Product Type: *No Product type* /    Assessment:     OT Assessment Results: Decreased ADL status, Decreased upper extremity range of motion, Decreased upper extremity strength, Decreased fine motor control  Strengths: Ability to acquire knowledge, Coping skills, Insight into problems  Plan:  Treatment Interventions: UE strengthening/ROM, Neuromuscular reeducation, UE splinting  Treatment Interventions: UE strengthening/ROM, Neuromuscular reeducation, UE splinting  OT Plan: 1 x week for 6 weeks  Frequency: 1 x week  Duration: 6 weeks  Onset Date: 11/11/24  Certification Period Start Date: 12/13/24  Certification Period End Date: 03/13/25  Number of Treatments Authorized: [place]  Rehab Potential: Good  Plan of Care Agreement: Patient    Subjective   Current Problem:  1. Unilateral primary osteoarthritis of first carpometacarpal joint, right hand  Referral to Occupational Therapy    Follow Up In Occupational Therapy        General:      General  Reason for Referral: ADL impairment right hand  Referred By: Dr. Pineda  Past Medical History Relevant to Rehab: R thumb CMC arthroplasty DOS 11/11/24; pmhx of [facility] 2 yrs previously, finger OA/stiffness and signs of palmar adhesions and trigger fingers.  Preferred Learning  Style: verbal, visual, written  Precautions:  Post-Surgical Precautions: Other (comment)  Splinting: thumb spica post op care for CMC arthroplasty  Precautions Comment: avoid resistance, full time splint use  Pain:  Pain Assessment  Pain Assessment: 0-10  0-10 (Numeric) Pain Score: 4  Pain Location: Hand  Pain Orientation: Right  Pain Radiating Towards: base of thumb  Pain Descriptors: Aching, Radiating  Pain Frequency: Intermittent  Patient's Stated Pain Goal: No pain    Objective   Cognition:  Overall Cognitive Status: Within Functional Limits           Home Living:  Type of Home: Condo  Lives With: Alone  Prior Function:  Level of [place]: Independent with ADLs and functional transfers, Independent with homemaking with ambulation  Hand Dominance: Right  IADL History:  Occupation: Retired  Type of Occupation: enjoys socializing friends, cleaning, doing dishes and unable to go to gym  ADL:  ADL Comments: UEFI completed see for details  Modalities:  Modalities Used: Yes  Modality 1: Untimed Fluidotherapy  Splinting:  Location: assess fit and thumb prefab  Splinting: Skin check  Splinting Education: Fitting, [place], [place], Wear schedule  Splinting Comments: full time, removal for ROM  Therapy/Activity:  *Pt reported previously had high BP (unusual), (taken at 105/ 70, no dizziness or light headed per patient, due to low blood pressure, discussed checking with PCP and stopping at ED if further concern). Ice water provided.   Therapeutic Exercise  Therapeutic Exercise Performed: Yes  Therapeutic Exercise Activity 1: provided HEP of wrist ROM, thumb finger exercises handout issued  Therapeutic Exercise Activity 2: practiced and performed ROM, opposition, wrist ext/flex and finger passive assist flexion as tolerated  Therapeutic Exercise Activity 3: Reviewed care and precautions of thumb incision and gentle massage, normal washing;            Sensation:  Light Touch: No apparent deficits  Sensation Comment:  intact to light touch  Strength: NA      Extremities: RUE   RUE : Within Functional Limits and LUE   LUE: Within Functional Limits      Outcome Measures: OT Adult Other Outcome Measures  Other Outcome Measures: UEFI 30/80 UEFI 30/80    OP EDUCATION:  Education  Individual(s) Educated: Patient  Education Provided: Diagnosis & Precautions, Symptom management, Joint protection and energy conservation, Orthotics wearing schedule and precautions, POC discussed and agreed upon, Orthotics and/or prosthetic trainging  Home Program: AROM, Activity modification, Modalities, Orthotic wearing schedule, care and precautions, Tendon gliding, Wound/scar care, Handout issued  Diagnosis and Precautions: pain and weakness right pinch and ; post op precautions and full time splint use  Risk and Benefits Discussed with Patient/Caregiver/Other: yes  Patient/Caregiver Demonstrated Understanding: yes  Plan of Care Discussed and Agreed Upon: yes  Patient Response to Education: Patient/Caregiver Verbalized Understanding of Information, Patient/Caregiver Performed Return Demonstration of Exercises/Activities, Patient/Caregiver Asked Appropriate Questions    Goals:  Active       OT Problem       Patient will increase right pinch strength to 12 lbs. without pain and MKI opposition to 6/10 for dressing, buttoning and opening containers.       Start:  12/13/24    Expected End:  03/13/25            Pt will demo 4 methods of right thumb CMC OA joint protection principles and application for food preparation. (Progressing)       Start:  12/13/24    Expected End:  03/13/25            PATIENT WILL SHOW A SIGNIFICANT CHANGE IN UEFI PATIENT REPORTED OUTCOME TOOL TO DEMONSTRATE SUBJECTIVE IMPROVEMENT       Start:  12/13/24    Expected End:  03/13/25            PATIENT WILL DEMONSTRATE INDEPENDENCE IN HOME PROGRAM FOR SUPPORT OF PROGRESSION (Progressing)       Start:  12/13/24    Expected End:  03/13/25            PATIENT WILL REPORT PAIN OF 0-1/10  DEMONSTRATING A REDUCTION OF OVERALL PAIN       Start:  12/13/24    Expected End:  03/13/25

## 2024-12-13 NOTE — LETTER
December 13, 2024    Noel Green MD  [address] [place] [address]  [address]  [place] [address]    Patient: Pauline Orta   YOB: 1950   Date of Visit: 12/13/2024       Dear Noel Green MD  [address]  [address]  [place] [address]    The attached plan of care is being sent to you because your patient’s medical reimbursement requires that you certify the plan of care. Your signature is required to allow uninterrupted insurance coverage.      You may indicate your approval by signing below and faxing this form back to us at Dept Fax: [phone].    Please call Dept: [phone] with any questions or concerns.    Thank you for this referral,        Annamaria Ayala OT  [facility]  [facility]  [address] [place] [address]    Payer: Payor: A[employer] MEDICARE / Plan: AETNA MEDICARE ASSURE / Product Type: *No Product type* /                                                                         Date:     Dear Annamaria Ayala OT,     Re: Ms. Pauline Orta, MRN:[id number]    I certify that I have reviewed the attached plan of care and it is medically necessary for Ms. Pauline Orta (1950) who is under my care.          ______________________________________                    _________________  Provider name and credentials                                           Date and time                                                                                           Plan of Care 12/13/24   Effective from: 12/13/2024  Effective to: 3/13/2025    Plan ID: [id number]            Participants as of Finalize on 12/13/2024    Name Type Comments Contact Info    Noel Green MD Referring Provider  [phone]    Annamaria Ayala OT Occupational Therapist  [phone]       Last Plan Note     Author: Annamaria Ayala OT Status: Sign when Signing Visit Last edited: 12/13/2024  1:45 PM       Occupational  Therapy    Evaluation/Treatment    Patient Name: Pauline Orta  MRN: [id number]  OT Received on: 12/13/2024    Time Calculation  Start Time: 1346  Stop Time: 1429  Time Calculation (min): 43 min  OT Evaluation Time Entry  OT Evaluation (Low) Time Entry: 20  OT Therapeutic Procedures Time Entry  Therapeutic Exercise Time Entry: 23    Visit # 1 of 6  Visits/Dates Authorized: no auth needed    Current Problem:   Problem List Items Addressed This Visit             ICD-10-CM    Unilateral primary osteoarthritis of first carpometacarpal joint, right hand - Primary M18.11    Relevant Orders    Follow Up In Occupational Therapy     Insurance Type: Payor: [facility] MEDICARE / Plan: AETNA MEDICARE ASSURE / Product Type: *No Product type* /    Assessment:     OT Assessment Results: Decreased ADL status, Decreased upper extremity range of motion, Decreased upper extremity strength, Decreased fine motor control  Strengths: Ability to acquire knowledge, Coping skills, Insight into problems  Plan:  Treatment Interventions: UE strengthening/ROM, Neuromuscular reeducation, UE splinting  Treatment Interventions: UE strengthening/ROM, Neuromuscular reeducation, UE splinting  OT Plan: 1 x week for 6 weeks  Frequency: 1 x week  Duration: 6 weeks  Onset Date: 11/11/24  Certification Period Start Date: 12/13/24  Certification Period End Date: 03/13/25  Number of Treatments Authorized: MN  Rehab Potential: Good  Plan of Care Agreement: Patient    Subjective   Current Problem:  1. Unilateral primary osteoarthritis of first carpometacarpal joint, right hand  Referral to Occupational Therapy    Follow Up In Occupational Therapy        General:      General  Reason for Referral: ADL impairment right hand  Referred By: Dr. Pineda  Past Medical History Relevant to Rehab: R thumb CMC arthroplasty DOS 11/11/24; pmhx of [facility] 2 yrs previously, finger OA/stiffness and signs of palmar adhesions and trigger fingers.  Preferred Learning Style: verbal,  visual, written  Precautions:  Post-Surgical Precautions: Other (comment)  Splinting: thumb spica post op care for CMC arthroplasty  Precautions Comment: avoid resistance, full time splint use  Pain:  Pain Assessment  Pain Assessment: 0-10  0-10 (Numeric) Pain Score: 4  Pain Location: Hand  Pain Orientation: Right  Pain Radiating Towards: base of thumb  Pain Descriptors: Aching, Radiating  Pain Frequency: Intermittent  Patient's Stated Pain Goal: No pain    Objective   Cognition:  Overall Cognitive Status: Within Functional Limits           Home Living:  Type of Home: Condo  Lives With: Alone  Prior Function:  Level of [place]: Independent with ADLs and functional transfers, Independent with homemaking with ambulation  Hand Dominance: Right  IADL History:  Occupation: Retired  Type of Occupation: enjoys socializing friends, cleaning, doing dishes and unable to go to gym  ADL:  ADL Comments: UEFI completed see for details  Modalities:  Modalities Used: Yes  Modality 1: Untimed Fluidotherapy  Splinting:  Location: assess fit and thumb prefab  Splinting: Skin check  Splinting Education: Fitting, [place], [place], Wear schedule  Splinting Comments: full time, removal for ROM  Therapy/Activity:  *Pt reported previously had high BP (unusual), (taken at 105/ 70, no dizziness or light headed per patient, due to low blood pressure, discussed checking with PCP and stopping at ED if further concern). Ice water provided.   Therapeutic Exercise  Therapeutic Exercise Performed: Yes  Therapeutic Exercise Activity 1: provided HEP of wrist ROM, thumb finger exercises handout issued  Therapeutic Exercise Activity 2: practiced and performed ROM, opposition, wrist ext/flex and finger passive assist flexion as tolerated  Therapeutic Exercise Activity 3: Reviewed care and precautions of thumb incision and gentle massage, normal washing;            Sensation:  Light Touch: No apparent deficits  Sensation Comment: intact to light  touch  Strength: NA      Extremities: RUE   RUE : Within Functional Limits and LUE   LUE: Within Functional Limits      Outcome Measures: OT Adult Other Outcome Measures  Other Outcome Measures: UEFI 30/80 UEFI 30/80    OP EDUCATION:  Education  Individual(s) Educated: Patient  Education Provided: Diagnosis & Precautions, Symptom management, Joint protection and energy conservation, Orthotics wearing schedule and precautions, POC discussed and agreed upon, Orthotics and/or prosthetic trainging  Home Program: AROM, Activity modification, Modalities, Orthotic wearing schedule, care and precautions, Tendon gliding, Wound/scar care, Handout issued  Diagnosis and Precautions: pain and weakness right pinch and ; post op precautions and full time splint use  Risk and Benefits Discussed with Patient/Caregiver/Other: yes  Patient/Caregiver Demonstrated Understanding: yes  Plan of Care Discussed and Agreed Upon: yes  Patient Response to Education: Patient/Caregiver Verbalized Understanding of Information, Patient/Caregiver Performed Return Demonstration of Exercises/Activities, Patient/Caregiver Asked Appropriate Questions    Goals:  Active       OT Problem       Patient will increase right pinch strength to 12 lbs. without pain and MKI opposition to 6/10 for dressing, buttoning and opening containers.       Start:  12/13/24    Expected End:  03/13/25            Pt will demo 4 methods of right thumb CMC OA joint protection principles and application for food preparation. (Progressing)       Start:  12/13/24    Expected End:  03/13/25            PATIENT WILL SHOW A SIGNIFICANT CHANGE IN UEFI PATIENT REPORTED OUTCOME TOOL TO DEMONSTRATE SUBJECTIVE IMPROVEMENT       Start:  12/13/24    Expected End:  03/13/25            PATIENT WILL DEMONSTRATE INDEPENDENCE IN HOME PROGRAM FOR SUPPORT OF PROGRESSION (Progressing)       Start:  12/13/24    Expected End:  03/13/25            PATIENT WILL REPORT PAIN OF 0-1/10 DEMONSTRATING A  REDUCTION OF OVERALL PAIN       Start:  12/13/24    Expected End:  03/13/25                      Current Participants as of 12/13/2024    Name Type Comments Contact Info    Noel Green MD Referring Provider  [phone]    Signature pending    Annamaria Ayala OT Occupational Therapist  [phone]

## 2024-12-17 ENCOUNTER — TREATMENT (OUTPATIENT)
Dept: OCCUPATIONAL THERAPY | Facility: CLINIC | Age: 74
End: 2024-12-17
Payer: MEDICARE

## 2024-12-17 DIAGNOSIS — M18.11 UNILATERAL PRIMARY OSTEOARTHRITIS OF FIRST CARPOMETACARPAL JOINT, RIGHT HAND: ICD-10-CM

## 2024-12-17 PROCEDURE — 97110 THERAPEUTIC EXERCISES: CPT | Mod: GO,CO

## 2024-12-17 ASSESSMENT — PAIN - FUNCTIONAL ASSESSMENT: PAIN_FUNCTIONAL_ASSESSMENT: 0-10

## 2024-12-17 ASSESSMENT — PAIN DESCRIPTION - DESCRIPTORS: DESCRIPTORS: SORE;ACHING

## 2024-12-17 ASSESSMENT — PAIN SCALES - GENERAL: PAINLEVEL_OUTOF10: 3

## 2024-12-17 NOTE — PROGRESS NOTES
"Occupational Therapy Treatment  Patient Name: Pauline Orta  MRN: [id number]  OT Received on: 12/17/2024 OT Received On: 12/17/24      Time Calculation  Start Time: 0147  Stop Time: 0245  Time Calculation (min): 58 min  OT Therapeutic Procedures Time Entry  Therapeutic Exercise Time Entry: 58  Insurance:  Visit Number: 2 of 6  12/12/24: 1) [employer] ASSURE - [place]PN / [place] VISITS / $240 DED MET / $8850 OOP not met / 80% COVERED. 2) [facility]% contracted rate / ds     Subjective   Problem List Items Addressed This Visit             ICD-10-CM       Musculoskeletal and Injuries    Unilateral primary osteoarthritis of first carpometacarpal joint, right hand M18.11   Referred by: Dr. Green, next follow up with Tatiana on 1/6/25. Unsure of next follow up with Peter at this time.     Patient reports \" I am just really sore\". Pt noting pain in R wrist and thumb and in L hand and L side of her neck.  3-4/10 at rest, up to 8 with some activity. Shooting and sharp. Pt using heat, took tylenol the last 1 of 7 days. Gabopenten each night.     Precautions: no resistance, splint with activity and during the day.     Performing HEP?: Yes    Pain:  Pain Assessment  Pain Assessment: 0-10  0-10 (Numeric) Pain Score: 3  Pain Location: Hand  Pain Orientation: Right  Pain Radiating Towards: base of thumb, volar wrist  Pain Descriptors: Sore, Aching  Pain Frequency: Intermittent  Patient's Stated Pain Goal: No pain    Objective   Physical Observation: healing, hard scar. One open area on thumb scar, 1/2 cm round. Not actively draining. No excessive drainage. Washing with soap and water, covering with band aid.   Edema: min in thumb/CMC area    Sensory: intact  Numbness/Tingling: none noted today      Treatment:Reviewed scar massage when open area heals. Able to return demo from writer.     Modalities:Ice applied x 5 min at end of session during wrap up to reduce edema and inflammation.  AROM while in fluido therapy " x 10 min to promote muscle movement during session  Provided with education on paraffin.       Therapeutic Exercise:  Pt completed 10 reps of  written HEP Wrist/thumb AROM  Educated in wrist alphabet, able to return demo from writer  Completed finger roll outs with marker, added to HEP  Finger ext with palm flat on table x 10 reps, added to HEP  Educated in tendon glides x 5 reps with mod cues for form and to hold 5 sec , written HEP provided.   Post-tx pain:5/10, slightly increased    Assessment/Plan Pt motivated to return to function. Pt to follow through added HEP, scar massage, edema control tech along with use of heat before and ice after exercises.   Education  Individual(s) Educated: Patient  Education Provided: Diagnosis & Precautions, Symptom management, Joint protection and energy conservation, Orthotics wearing schedule and precautions, POC discussed and agreed upon, Orthotics and/or prosthetic trainging, Risk and benefits of OT discussed with patient or other  Home Program: AROM, Activity modification, Modalities, Orthotic wearing schedule, care and precautions, Tendon gliding, Wound/scar care, Handout issued  Diagnosis and Precautions: pain and weakness right pinch and ; post op precautions and full time splint use  Risk and Benefits Discussed with Patient/Caregiver/Other: yes  Patient/Caregiver Demonstrated Understanding: yes  Plan of Care Discussed and Agreed Upon: yes  Patient Response to Education: Patient/Caregiver Verbalized Understanding of Information, Patient/Caregiver Performed Return Demonstration of Exercises/Activities, Patient/Caregiver Asked Appropriate Questions    Goals:  Active       OT Problem       Patient will increase right pinch strength to 12 lbs. without pain and MKI opposition to 6/10 for dressing, buttoning and opening containers. (Progressing)       Start:  12/13/24    Expected End:  03/13/25            Pt will demo 4 methods of right thumb CMC OA joint protection  principles and application for food preparation. (Progressing)       Start:  12/13/24    Expected End:  03/13/25            PATIENT WILL SHOW A SIGNIFICANT CHANGE IN UEFI PATIENT REPORTED OUTCOME TOOL TO DEMONSTRATE SUBJECTIVE IMPROVEMENT (Progressing)       Start:  12/13/24    Expected End:  03/13/25            PATIENT WILL DEMONSTRATE INDEPENDENCE IN HOME PROGRAM FOR SUPPORT OF PROGRESSION (Progressing)       Start:  12/13/24    Expected End:  03/13/25            PATIENT WILL REPORT PAIN OF 0-1/10 DEMONSTRATING A REDUCTION OF OVERALL PAIN (Progressing)       Start:  12/13/24    Expected End:  03/13/25

## 2024-12-20 ENCOUNTER — TREATMENT (OUTPATIENT)
Dept: OCCUPATIONAL THERAPY | Facility: CLINIC | Age: 74
End: 2024-12-20
Payer: MEDICARE

## 2024-12-20 DIAGNOSIS — M18.11 UNILATERAL PRIMARY OSTEOARTHRITIS OF FIRST CARPOMETACARPAL JOINT, RIGHT HAND: ICD-10-CM

## 2024-12-20 PROCEDURE — 97110 THERAPEUTIC EXERCISES: CPT | Mod: GO,CO

## 2024-12-20 ASSESSMENT — PAIN SCALES - GENERAL: PAINLEVEL_OUTOF10: 4

## 2024-12-20 ASSESSMENT — PAIN DESCRIPTION - DESCRIPTORS: DESCRIPTORS: SORE;OTHER (COMMENT)

## 2024-12-20 ASSESSMENT — PAIN - FUNCTIONAL ASSESSMENT: PAIN_FUNCTIONAL_ASSESSMENT: 0-10

## 2024-12-20 NOTE — PROGRESS NOTES
"Occupational Therapy Treatment  Patient Name: Pauline Orta  MRN: [id number]  OT Received on: 12/20/2024 OT Received On: 12/20/24      Time Calculation  Start Time: 0937  Stop Time: 1023  Time Calculation (min): 46 min  OT Therapeutic Procedures Time Entry  Therapeutic Exercise Time Entry: 46  Insurance:  Visit Number: 3 of 6  12/12/24: 1) [employer]ET[employer] ASSURE - NPN / [place] VISITS / $240 DED MET / $8850 OOP not met / 80% COVERED. 2) [facility] - 100% contracted rate / ds     Subjective   Problem List Items Addressed This Visit             ICD-10-CM       Musculoskeletal and Injuries    Unilateral primary osteoarthritis of first carpometacarpal joint, right hand M18.11   Referred by: Dr. Green, next follow up with Tatiana on 1/6/25. Unsure of next follow up with Peter at this time.     Patient reports \" I am just really sore\". Pt noting pain in R wrist , along thumb and in L hand.  4/10 , subsides with at rest, up to 8 with some activity.\" Sore and swollen\". Pt using heat, tylenol the last 3 of 3 days. Gabopenten twice a day.      Precautions: no resistance, splint with activity and during the day. Sleeve at night.     Performing HEP?: Yes    Pain:  Pain Assessment  Pain Assessment: 0-10  0-10 (Numeric) Pain Score: 4  Pain Location: Hand  Pain Orientation: Right  Pain Radiating Towards: base of thumb  Pain Descriptors: Sore, Other (Comment) (swollen)  Pain Frequency: Intermittent  Patient's Stated Pain Goal: No pain4/10    Objective   Physical Observation: healing, hard scar. One scabbed ( was open)  area on thumb scar, 1/2 cm round. Not actively draining.  Washing with soap and water, open to air.   Edema: min continues in thumb/CMC area  MKI 5/10  Sensory: intact  Numbness/Tingling: none noted today      Treatment:Reviewed scar massage when scabbed  area heals. Able to return demo from writer.     Modalities:Ice applied x 5 min at end of session during wrap up to reduce edema and inflammation.  AROM " while in fluido therapy x 10 min to promote muscle movement during session  Reviewed benefit of paraffin.       Therapeutic Exercise:  Pt completed 12 ( was  10) reps of  written HEP Wrist/thumb AROM with noted difficulty with circumduction. Focus on palmar abduction.   Educated in wrist alphabet, able to return demo from writer  Completed finger roll outs with marker,cues for form  Finger lifts , individual with palm flat on table x 10 reps, noted able to hold for longer period of time today  Reviewed   tendon glides x 5 reps with mod cues for form and to hold 5 sec , continues to have difficulty maintaining form  Post-tx pain:5/10, slightly increased since start of therapy    Assessment/Plan Pt motivated to return to function. Pt to follow through added HEP, scar massage, edema control tech along with use of heat before and ice after exercises.   Education  Individual(s) Educated: Patient  Education Provided: Diagnosis & Precautions, Symptom management, Joint protection and energy conservation, Orthotics wearing schedule and precautions, POC discussed and agreed upon, Orthotics and/or prosthetic trainging, Risk and benefits of OT discussed with patient or other  Home Program: AROM, Activity modification, Modalities, Orthotic wearing schedule, care and precautions, Tendon gliding, Wound/scar care, Handout issued  Diagnosis and Precautions: pain and weakness right pinch and ; post op precautions and full time splint use  Risk and Benefits Discussed with Patient/Caregiver/Other: yes  Patient/Caregiver Demonstrated Understanding: yes  Plan of Care Discussed and Agreed Upon: yes  Patient Response to Education: Patient/Caregiver Verbalized Understanding of Information, Patient/Caregiver Performed Return Demonstration of Exercises/Activities, Patient/Caregiver Asked Appropriate Questions    Goals:  Active       OT Problem       Patient will increase right pinch strength to 12 lbs. without pain and MKI opposition to  6/10 for dressing, buttoning and opening containers. (Progressing)       Start:  12/13/24    Expected End:  03/13/25            Pt will demo 4 methods of right thumb CMC OA joint protection principles and application for food preparation. (Progressing)       Start:  12/13/24    Expected End:  03/13/25            PATIENT WILL SHOW A SIGNIFICANT CHANGE IN UEFI PATIENT REPORTED OUTCOME TOOL TO DEMONSTRATE SUBJECTIVE IMPROVEMENT (Progressing)       Start:  12/13/24    Expected End:  03/13/25            PATIENT WILL DEMONSTRATE INDEPENDENCE IN HOME PROGRAM FOR SUPPORT OF PROGRESSION (Progressing)       Start:  12/13/24    Expected End:  03/13/25            PATIENT WILL REPORT PAIN OF 0-1/10 DEMONSTRATING A REDUCTION OF OVERALL PAIN (Progressing)       Start:  12/13/24    Expected End:  03/13/25

## 2024-12-27 ENCOUNTER — TREATMENT (OUTPATIENT)
Dept: OCCUPATIONAL THERAPY | Facility: CLINIC | Age: 74
End: 2024-12-27
Payer: MEDICARE

## 2024-12-27 DIAGNOSIS — M18.11 UNILATERAL PRIMARY OSTEOARTHRITIS OF FIRST CARPOMETACARPAL JOINT, RIGHT HAND: ICD-10-CM

## 2024-12-27 PROCEDURE — 97110 THERAPEUTIC EXERCISES: CPT | Mod: GO,CO

## 2024-12-27 ASSESSMENT — PAIN SCALES - GENERAL: PAINLEVEL_OUTOF10: 4

## 2024-12-27 ASSESSMENT — PAIN - FUNCTIONAL ASSESSMENT: PAIN_FUNCTIONAL_ASSESSMENT: 0-10

## 2024-12-27 ASSESSMENT — PAIN DESCRIPTION - DESCRIPTORS: DESCRIPTORS: SORE

## 2024-12-27 NOTE — PROGRESS NOTES
"Occupational Therapy Treatment  Patient Name: Pauline Orta  MRN: [id number]  OT Received on: 12/27/2024    Time:  Time Calculation  Start Time: 0200  Stop Time: 0255  Time Calculation (min): 55 min  OT Therapeutic Procedures Time Entry  Therapeutic Exercise Time Entry: 55  Insurance:  Visit Number: 4 of 6  12/12/24: 1) [employer]ET[employer] ASSURE - NPN / [place] VISITS / $240 DED MET / $8850 OOP not met / 80% COVERED. 2) [facility] SECONDARY - 100% contracted rate / ds     Subjective   Problem List Items Addressed This Visit             ICD-10-CM       Musculoskeletal and Injuries    Unilateral primary osteoarthritis of first carpometacarpal joint, right hand M18.11   Referred by: Dr. Green, next follow up with Tatiana on 1/6/25. Unsure of next follow up with Peter at this time.     Patient reports \" My thumb seems to be doing much better but my wrist is still stiff\".  Pt using heat, tylenol  only before therapy ( was 3 of 3 days. ) Gabopenten twice a day.  Goals and precautions reviewed with patient, she verbalized understanding.     Precautions: no resistance, splint with activity and during the day. Sleeve at night. Replaced by writer today    Performing HEP?: Yes    Pain:   4/10 with movement in wrist. 0/10 in thumb except at end range circumduction.     Objective   Physical Observation: healing, hard scar. One pinpoint scab on thumb incision site. No draining.   Washing with soap and water, keeping dry  Edema: trace /min ( was min) continues in thumb/CMC area  MKI 7/10 ( was 5/10)  UEFI completed by patient today at   /80 ( was 30/80 at eval)   Sensory: intact  Numbness/Tingling: none noted today  Treatment:Reviewed scar massage. Able to return demo from writer.     Modalities  Ice applied x 5 min at end of session during wrap up to reduce edema and inflammation.  AROM while in fluido therapy x 10 min to promote muscle movement during session  Reviewed benefit of paraffin. Pt has her own unit at home. "       Therapeutic Exercise:  Pt completing up to 15  reps of  written HEP Wrist/thumb AROM with less difficulty.   Reviewed wrist alphabet, able to return demo from writer  Completed finger roll outs with marker,to continue as part of HEP  Finger lifts , individual with palm flat on table x 10 reps, noted able to hold for longer period of time today  Reviewed   tendon glides x 5 reps with mod cues for form and to hold 5 sec   Initiated  golf  ball manipulation, little to no difficulty with completion  Initiated  gentle thumb foam manipulation, added to HEP  Post-tx pain:5/10, slightly increased since start of therapy    Assessment/Plan Pt motivated to return to function. Pt to follow through added HEP, scar massage, edema control tech along with use of heat before and ice after exercises.   Next visit/week 8 can begin gentle MMS according to protocol        Goals:  Active       OT Problem       Patient will increase right pinch strength to 12 lbs. without pain and MKI opposition to 6/10 for dressing, buttoning and opening containers. (Progressing)       Start:  12/13/24    Expected End:  03/13/25            Pt will demo 4 methods of right thumb CMC OA joint protection principles and application for food preparation. (Progressing)       Start:  12/13/24    Expected End:  03/13/25            PATIENT WILL SHOW A SIGNIFICANT CHANGE IN UEFI PATIENT REPORTED OUTCOME TOOL TO DEMONSTRATE SUBJECTIVE IMPROVEMENT (Progressing)       Start:  12/13/24    Expected End:  03/13/25            PATIENT WILL DEMONSTRATE INDEPENDENCE IN HOME PROGRAM FOR SUPPORT OF PROGRESSION (Progressing)       Start:  12/13/24    Expected End:  03/13/25            PATIENT WILL REPORT PAIN OF 0-1/10 DEMONSTRATING A REDUCTION OF OVERALL PAIN (Progressing)       Start:  12/13/24    Expected End:  03/13/25

## 2024-12-31 ENCOUNTER — APPOINTMENT (OUTPATIENT)
Dept: OCCUPATIONAL THERAPY | Facility: CLINIC | Age: 74
End: 2024-12-31
Payer: MEDICARE

## 2025-01-03 ENCOUNTER — TELEPHONE (OUTPATIENT)
Dept: PRIMARY CARE | Facility: CLINIC | Age: 75
End: 2025-01-03
Payer: COMMERCIAL

## 2025-01-03 DIAGNOSIS — K21.9 GASTROESOPHAGEAL REFLUX DISEASE, UNSPECIFIED WHETHER ESOPHAGITIS PRESENT: Primary | ICD-10-CM

## 2025-01-03 DIAGNOSIS — G89.29 OTHER CHRONIC PAIN: ICD-10-CM

## 2025-01-03 RX ORDER — TIZANIDINE 4 MG/1
4 TABLET ORAL EVERY 8 HOURS PRN
Qty: 90 TABLET | Refills: 2 | Status: SHIPPED | OUTPATIENT
Start: 2025-01-03

## 2025-01-06 ENCOUNTER — APPOINTMENT (OUTPATIENT)
Dept: ORTHOPEDIC SURGERY | Facility: CLINIC | Age: 75
End: 2025-01-06
Payer: MEDICARE

## 2025-01-06 NOTE — TELEPHONE ENCOUNTER
Lvm for pt to call the office to confirm the dosage taking pt has 20 mg omeparazole   [Mother] : mother

## 2025-01-07 ENCOUNTER — TREATMENT (OUTPATIENT)
Dept: OCCUPATIONAL THERAPY | Facility: CLINIC | Age: 75
End: 2025-01-07
Payer: MEDICARE

## 2025-01-07 DIAGNOSIS — M18.11 UNILATERAL PRIMARY OSTEOARTHRITIS OF FIRST CARPOMETACARPAL JOINT, RIGHT HAND: ICD-10-CM

## 2025-01-07 PROCEDURE — 97140 MANUAL THERAPY 1/> REGIONS: CPT | Mod: GO | Performed by: OCCUPATIONAL THERAPIST

## 2025-01-07 PROCEDURE — 97530 THERAPEUTIC ACTIVITIES: CPT | Mod: GO | Performed by: OCCUPATIONAL THERAPIST

## 2025-01-07 PROCEDURE — 97110 THERAPEUTIC EXERCISES: CPT | Mod: GO | Performed by: OCCUPATIONAL THERAPIST

## 2025-01-07 ASSESSMENT — PAIN - FUNCTIONAL ASSESSMENT: PAIN_FUNCTIONAL_ASSESSMENT: 0-10

## 2025-01-07 ASSESSMENT — PAIN SCALES - GENERAL: PAINLEVEL_OUTOF10: 2

## 2025-01-07 ASSESSMENT — PAIN DESCRIPTION - DESCRIPTORS: DESCRIPTORS: TENDER

## 2025-01-07 NOTE — PROGRESS NOTES
"Occupational Therapy Treatment  Patient Name: Pauline Orta  MRN: [id number]  OT Received on: 1/7/25  Time:  Time Calculation  Start Time: 1415  Stop Time: 1458  Time Calculation (min): 43 min  OT Therapeutic Procedures Time Entry  Manual Therapy Time Entry: 13  Therapeutic Activity Time Entry: 10  Therapeutic Exercise Time Entry: 20    Insurance:  Visit number: 5 of 6  Authorization info:   Insurance Type: Payor: [facility] MEDICARE / Plan: [facility] DUAL ADVANTAGE / Product Type: *No Product type* /       Subjective   Problem List Items Addressed This Visit             ICD-10-CM       Musculoskeletal and Injuries    Unilateral primary osteoarthritis of first carpometacarpal joint, right hand M18.11   Referred by: Dr. Green, next follow up with Tatiana on 1/6/25. Unsure of next follow up with Peter at this time.     Patient reports \" I saw Dr. Green, and x rays good, medication for arthritis; continue as needed;   Goals and precautions reviewed with patient, she verbalized understanding.     Precautions:  progressing as tolerated      Performing HEP?: Yes  Pain:   2/10 with thumb and wrist with use; no pain at rest; Dr Green discontinued use of thumb spica splint.     Objective   Physical Observation: well healed  Edema: trace   UEFI completed by patient ( was 30/80 at eval)   Sensory: intact  AROM of right thumb opposition to 8/10 MKI   Numbness/Tingling: none noted today  Treatment:    Modalities  Pt completed warm up of wrist/thumb ROM while in fluido therapy x 10 min with direct therapist supervision  Reviewed use and benefit of paraffin. Pt has her own unit at home.       Therapeutic Exercise:  Pt completing up to 15  reps of  written HEP Wrist/thumb AROM with less difficulty.   Completed wrist alphabet, x 2   Completed HEP of light soft theraputty; handout issued and pt reports no pain with pinch and gripping; reviewed care and precautions.  Completed passive stretching of right hand palm " flat on table x 10 reps, noted able to hold with passive full extension, tolerated well  Reviewed  tendon glides x 5 reps with mod cues for form and to hold 5 sec   Continue with home program of golf  ball manipulation,  Initiated gentle thumb foam manipulation, added to HEP  Post-tx pain:  1-2/10    Assessment/Plan  Pt doing well and tolerated increase of strengthening program with thumb pinching/gripping with soft theraputty. Pt motivated to return to function. Pt to follow through upgraded HEP, scar massage, edema control tech along with use of heat before and ice after exercises.          Goals:  Active       OT Problem       Patient will increase right pinch strength to 12 lbs. without pain and MKI opposition to 6/10 for dressing, buttoning and opening containers. (Progressing)       Start:  12/13/24    Expected End:  03/13/25            Pt will demo 4 methods of right thumb CMC OA joint protection principles and application for food preparation. (Progressing)       Start:  12/13/24    Expected End:  03/13/25            PATIENT WILL SHOW A SIGNIFICANT CHANGE IN UEFI PATIENT REPORTED OUTCOME TOOL TO DEMONSTRATE SUBJECTIVE IMPROVEMENT (Progressing)       Start:  12/13/24    Expected End:  03/13/25            PATIENT WILL DEMONSTRATE INDEPENDENCE IN HOME PROGRAM FOR SUPPORT OF PROGRESSION (Progressing)       Start:  12/13/24    Expected End:  03/13/25            PATIENT WILL REPORT PAIN OF 0-1/10 DEMONSTRATING A REDUCTION OF OVERALL PAIN (Progressing)       Start:  12/13/24    Expected End:  03/13/25

## 2025-01-08 ENCOUNTER — TELEPHONE (OUTPATIENT)
Dept: PRIMARY CARE | Facility: CLINIC | Age: 75
End: 2025-01-08
Payer: COMMERCIAL

## 2025-01-08 DIAGNOSIS — K21.9 GASTROESOPHAGEAL REFLUX DISEASE, UNSPECIFIED WHETHER ESOPHAGITIS PRESENT: Primary | ICD-10-CM

## 2025-01-08 RX ORDER — OMEPRAZOLE 20 MG/1
20 TABLET, DELAYED RELEASE ORAL
Qty: 90 TABLET | Refills: 3 | Status: SHIPPED | OUTPATIENT
Start: 2025-01-08 | End: 2026-01-08

## 2025-01-08 NOTE — TELEPHONE ENCOUNTER
[facility] [phone] RECEIVED RX FOR OMEPRAZOLE 20 MG TABS IS IT POSSIBLE TO CHANGE TO CAPSULE TABLETS ARE NOT COVERED UNDER INSURANCE

## 2025-01-09 NOTE — TELEPHONE ENCOUNTER
Call placed to patient to confirm rx dosage. Patient states she is taking Omeprazole twice daily but is unsure of dosage and requested office reach out to her pharmacy as she has already discarded pill bottle

## 2025-01-14 ENCOUNTER — TREATMENT (OUTPATIENT)
Dept: OCCUPATIONAL THERAPY | Facility: CLINIC | Age: 75
End: 2025-01-14
Payer: MEDICARE

## 2025-01-14 DIAGNOSIS — M18.11 UNILATERAL PRIMARY OSTEOARTHRITIS OF FIRST CARPOMETACARPAL JOINT, RIGHT HAND: ICD-10-CM

## 2025-01-14 PROCEDURE — 97110 THERAPEUTIC EXERCISES: CPT | Mod: GO,CO

## 2025-01-14 RX ORDER — OMEPRAZOLE 20 MG/1
20 CAPSULE, DELAYED RELEASE ORAL
Qty: 180 CAPSULE | Refills: 1 | Status: SHIPPED | OUTPATIENT
Start: 2025-01-14 | End: 2025-07-13

## 2025-01-14 ASSESSMENT — PAIN DESCRIPTION - DESCRIPTORS: DESCRIPTORS: SORE

## 2025-01-14 ASSESSMENT — PAIN SCALES - GENERAL: PAINLEVEL_OUTOF10: 2

## 2025-01-14 ASSESSMENT — PAIN - FUNCTIONAL ASSESSMENT: PAIN_FUNCTIONAL_ASSESSMENT: 0-10

## 2025-01-14 NOTE — PROGRESS NOTES
"Occupational Therapy Treatment  Patient Name: Pauline Orta  MRN: [id number]  OT Received on: 1/14/2025 OT Received On: 01/14/25  Time:  Time Calculation  Start Time: 0146  Stop Time: 0236  Time Calculation (min): 50 min  OT Therapeutic Procedures Time Entry  Therapeutic Exercise Time Entry: 50  Insurance:  Visit Number: 6 of 7  12/12/24: 1) [employer] - NPN / [place] VISITS / $240 DED MET / $8850 OOP not met / 80% COVERED. 2) [facility] - 100% contracted rate / ds     Subjective   Problem List Items Addressed This Visit             ICD-10-CM       Musculoskeletal and Injuries    Unilateral primary osteoarthritis of first carpometacarpal joint, right hand M18.11   Referred by: Dr. Green, next follow up in 2 months.     Patient reports \" I am doing much better. My thumb almost feels like normal\".  Pt no longer using moist heat or ice. No meds. ( was 3 of 3 days. ) Gabopenten twice a day.  Voltaren 1-2 times a day and paraffin unit once a day.  Goals and precautions reviewed with patient, she verbalized understanding.     Precautions: NWB, no more splint  Performing HEP?: Yes    Pain:  Pain Assessment  Pain Assessment: 0-10  0-10 (Numeric) Pain Score: 2  Pain Location: Hand  Pain Orientation: Mid, Right  Pain Radiating Towards: web space and wrist  Pain Descriptors: Sore  Pain Frequency: Intermittent (pain only with movement)  Objective   Physical Observation: soft scar.   Edema: none ( was trace)  MKI 8/10 ( was 7/10)  UEFI completed by patient today at   70/80 ( was 30/80 at eval)   Sensory: intact  Numbness/Tingling: none noted today  Baseline pinch and  established today:  R   25#  L   40#  2 point pinch R 10#   2 point pinch L 10.5#   3 point pinch R  7#   3 point pinch L  10.5 #   Lateral pinch R  6.5 #   Lateral pinch L 6.5#   Treatment:Provided patient with replacement compression sleeve    Modalities  AROM while in fluido therapy x 10 min to promote muscle movement during " session  Pt did not want ice at end of session  Therapeutic Exercise:  Reviewed HEP Wrist/thumb AROM, increasing reps as tolerated  Putty HEP upgraded today, ( low resitance yellow ). Written program provided.  Pt completed 1 set of 10 reps each for gross grasp, individual pinch, thumb flexion, wrist flexion and extension, lumbrical pinch. Putty provided.  Initiated one band resistance thumb ext, thumb abduction and finger ext with thumb abd. 1 set of 10 with 5 sec hold .   Post-tx pain:in FA, radial side 5/10    Assessment/Plan Pt motivated to return to function. Noted increase score in UEFI , patient returning to functional activity. and decreased pain at start of session. Pt to follow through added HEP, scar massage, edema control tech along with use of heat before and ice after exercises.   Next visit/week 8 can begin gentle MMS according to protocol   Education  Individual(s) Educated: Patient  Education Provided: Diagnosis & Precautions, Risk and benefits of OT discussed with patient or other, POC discussed and agreed upon    Goals:  Active       OT Problem       Patient will increase right pinch strength to 12 lbs. without pain and MKI opposition to 6/10 for dressing, buttoning and opening containers. (Progressing)       Start:  12/13/24    Expected End:  03/13/25            Pt will demo 4 methods of right thumb CMC OA joint protection principles and application for food preparation. (Met)       Start:  12/13/24    Expected End:  03/13/25    Resolved:  01/14/25         PATIENT WILL SHOW A SIGNIFICANT CHANGE IN UEFI PATIENT REPORTED OUTCOME TOOL TO DEMONSTRATE SUBJECTIVE IMPROVEMENT (Progressing)       Start:  12/13/24    Expected End:  03/13/25            PATIENT WILL DEMONSTRATE INDEPENDENCE IN HOME PROGRAM FOR SUPPORT OF PROGRESSION (Progressing)       Start:  12/13/24    Expected End:  03/13/25            PATIENT WILL REPORT PAIN OF 0-1/10 DEMONSTRATING A REDUCTION OF OVERALL PAIN (Progressing)        Start:  12/13/24    Expected End:  03/13/25

## 2025-01-21 ENCOUNTER — DOCUMENTATION (OUTPATIENT)
Dept: OCCUPATIONAL THERAPY | Facility: CLINIC | Age: 75
End: 2025-01-21
Payer: COMMERCIAL

## 2025-01-21 NOTE — PROGRESS NOTES
Occupational Therapy                 Therapy Communication Note    Patient Name: Pauline Orta  MRN: [id number]  Department:   OT OP  Today's Date: 1/21/2025     Discipline: Occupational Therapy          Missed Visit Reason:      Missed Time: No Show    Comment: contacted patient and left voice mail to reschedule OT appt

## 2025-01-23 ENCOUNTER — OFFICE VISIT (OUTPATIENT)
Dept: PAIN MEDICINE | Facility: CLINIC | Age: 75
End: 2025-01-23
Payer: COMMERCIAL

## 2025-01-23 VITALS
DIASTOLIC BLOOD PRESSURE: 80 MMHG | SYSTOLIC BLOOD PRESSURE: 150 MMHG | RESPIRATION RATE: 16 BRPM | HEART RATE: 88 BPM | OXYGEN SATURATION: 94 % | HEIGHT: 63 IN | BODY MASS INDEX: 26.22 KG/M2 | WEIGHT: 148 LBS

## 2025-01-23 DIAGNOSIS — M47.812 CERVICAL SPONDYLOSIS WITHOUT MYELOPATHY: ICD-10-CM

## 2025-01-23 DIAGNOSIS — M54.12 CERVICAL RADICULOPATHY: ICD-10-CM

## 2025-01-23 DIAGNOSIS — M96.1 POSTLAMINECTOMY SYNDROME, CERVICAL REGION: Primary | ICD-10-CM

## 2025-01-23 DIAGNOSIS — M50.30 DDD (DEGENERATIVE DISC DISEASE), CERVICAL: ICD-10-CM

## 2025-01-23 DIAGNOSIS — G89.29 OTHER CHRONIC PAIN: ICD-10-CM

## 2025-01-23 PROCEDURE — 99214 OFFICE O/P EST MOD 30 MIN: CPT | Performed by: ANESTHESIOLOGY

## 2025-01-23 RX ORDER — GABAPENTIN 800 MG/1
800 TABLET ORAL 3 TIMES DAILY
Qty: 90 TABLET | Refills: 3 | Status: SHIPPED | OUTPATIENT
Start: 2025-01-23 | End: 2025-05-23

## 2025-01-23 ASSESSMENT — PATIENT HEALTH QUESTIONNAIRE - PHQ9
SUM OF ALL RESPONSES TO PHQ9 QUESTIONS 1 AND 2: 0
2. FEELING DOWN, DEPRESSED OR HOPELESS: NOT AT ALL
1. LITTLE INTEREST OR PLEASURE IN DOING THINGS: NOT AT ALL

## 2025-01-23 ASSESSMENT — ENCOUNTER SYMPTOMS
CONSTITUTIONAL NEGATIVE: 1
RESPIRATORY NEGATIVE: 1
ENDOCRINE NEGATIVE: 1
EYES NEGATIVE: 1
MYALGIAS: 1
CARDIOVASCULAR NEGATIVE: 1
JOINT SWELLING: 1
ALLERGIC/IMMUNOLOGIC NEGATIVE: 1
HEMATOLOGIC/LYMPHATIC NEGATIVE: 1
HEADACHES: 1
NECK STIFFNESS: 1
BACK PAIN: 1
WEAKNESS: 1
GASTROINTESTINAL NEGATIVE: 1
PSYCHIATRIC NEGATIVE: 1
ARTHRALGIAS: 1
NECK PAIN: 1
NUMBNESS: 0

## 2025-01-23 ASSESSMENT — PAIN SCALES - GENERAL
PAINLEVEL_OUTOF10: 6
PAINLEVEL_OUTOF10: 6

## 2025-01-23 ASSESSMENT — PAIN DESCRIPTION - DESCRIPTORS: DESCRIPTORS: ACHING

## 2025-01-23 ASSESSMENT — PAIN - FUNCTIONAL ASSESSMENT: PAIN_FUNCTIONAL_ASSESSMENT: 0-10

## 2025-01-23 NOTE — PROGRESS NOTES
Subjective   Patient ID: Pauline Orta is a 74 y.o. female who presents for Back Pain and Neck Pain.  Back Pain  Associated symptoms include headaches and weakness. Pertinent negatives include no numbness.   Neck Pain   Associated symptoms include headaches and weakness. Pertinent negatives include no numbness.   Patient here today for follow up.  She was last seen in September.  She reports severe grabbing neck pain.  She has a history of two previous neck surgeries many years. Ago.  The pain will come without warning and she will get up and move to get the pain gone.  This will happen a few times per day.  The pain is located in the neck and upper shoulders.  She has worse pain on the right vs the left.  She finds pain in all positions and she is always shifting.  She rates the pain as a 6/10 today.  She has not had any PT on her neck recently.  She jsut had a CMC joint replacement and had finished up OT on that.  She has some weakness in both arms.  Her surgeries were > 20 years.  She had her surgery with Dr. Yoo.  She continue to take the gabapentin which is very helpful for her.      Review of Systems   Constitutional: Negative.    HENT: Negative.     Eyes: Negative.    Respiratory: Negative.     Cardiovascular: Negative.    Gastrointestinal: Negative.    Endocrine: Negative.    Genitourinary: Negative.    Musculoskeletal:  Positive for arthralgias, back pain, joint swelling, myalgias, neck pain and neck stiffness.   Skin: Negative.    Allergic/Immunologic: Negative.    Neurological:  Positive for weakness and headaches. Negative for numbness.   Hematological: Negative.    Psychiatric/Behavioral: Negative.         Objective   Physical Exam  Vitals and nursing note reviewed.   Constitutional:       Appearance: Normal appearance.   HENT:      Head: Normocephalic and atraumatic.      Right Ear: Ear canal and external ear normal.      Left Ear: Ear canal and external ear normal.      Nose: Nose normal.       Mouth/Throat:      Mouth: Mucous membranes are moist.      Pharynx: Oropharynx is clear.   Eyes:      Conjunctiva/sclera: Conjunctivae normal.      Pupils: Pupils are equal, round, and reactive to light.   Neck:     Cardiovascular:      Rate and Rhythm: Normal rate.   Pulmonary:      Effort: Pulmonary effort is normal. No respiratory distress.   Musculoskeletal:      Right hand: Deformity, tenderness and bony tenderness present. Decreased range of motion. Decreased strength.      Left hand: Deformity, tenderness and bony tenderness present. Decreased range of motion. Decreased strength.      Cervical back: Neck supple. Pain with movement and muscular tenderness present. No spinous process tenderness. Decreased range of motion.      Lumbar back: Tenderness present. Normal range of motion.   Lymphadenopathy:      Cervical: No cervical adenopathy.   Skin:     General: Skin is warm and dry.   Neurological:      Mental Status: She is alert.   Psychiatric:         Mood and Affect: Mood normal.         Thought Content: Thought content normal.         Assessment/Plan   Problem List Items Addressed This Visit             ICD-10-CM    Chronic pain G89.29    Postlaminectomy syndrome, cervical region - Primary M96.1        I nice discussion with the patient today our plan will be as follows.    Radiology: CERVICAL:     Counting reference:  Craniocervical junction.   Anatomic Variants:  None.     Alignment:    Straightening of the normal expected cervical lordosis.    Grade 1 anterolisthesis of C2 on C3.     Craniocervical junction:    Craniocervical junction is normal.     Osseous structures/fracture: Postsurgical change from anterior spinal   fusion and discectomy at C3-C5 with intact hardware.  Additional   postsurgical change from C6 corpectomy with bony fusion.  No evidence of   a lytic or blastic process in the visualized spine.  No evidence of acute   or chronic fracture.     Cervical soft tissues:    The paraspinal soft  tissues planes are   maintained.     Degenerative changes: Severe discogenic degenerative change at C7-T1 and   T1-T2 with disc space narrowing and endplate spurring.  Mild discogenic   degenerative change at C2-C3.  Multilevel facet joint arthrosis, which   appears most pronounced at C2-C3 and C7-T1.  Multilevel bilateral bony   neural foraminal narrowing is present and appears most pronounced at   C2-C3 and C7-T1 with most pronounced narrowing at the left C2-C3 neural   foramen with severe narrowing.  Severe appearing bony neural foraminal   narrowing is also noted at the imaged upper thoracic spine levels (T1-T2   and T2-T3).  These findings are similar to 12/30/2022.     Patient to have Cervical MRI.     Physically:  Patient to start PT 2 times per week for 6 weeks.     Psychologically:  No issues at this time.     Medication: Gabapentin refill today.     Duration:  > 1 year    Intervention:  Patient with a history of 2 cervical surgeries int he pain with significant neck and radiating pain.  Patient to have C7/T1 CARINE with local anesthetic, light sedation, and fluoroscopic guidance.  Risks, benefit, and alternatives of the procedure were discussed with the patient.  Oswestry score has been compelted and recorded.            Please note that this report has been produced using speech recognition software. It may contain errors related to grammar, punctuation or spelling. Electronically signed, but not reviewed.       Yash Grace MD 01/23/25 3:29 PM

## 2025-01-29 DIAGNOSIS — F41.9 ANXIETY: ICD-10-CM

## 2025-01-29 DIAGNOSIS — G89.29 OTHER CHRONIC PAIN: ICD-10-CM

## 2025-01-29 DIAGNOSIS — M47.812 CERVICAL SPONDYLOSIS WITHOUT MYELOPATHY: ICD-10-CM

## 2025-01-29 DIAGNOSIS — Z76.0 MEDICATION REFILL: Primary | ICD-10-CM

## 2025-01-29 DIAGNOSIS — K21.9 GASTROESOPHAGEAL REFLUX DISEASE, UNSPECIFIED WHETHER ESOPHAGITIS PRESENT: ICD-10-CM

## 2025-01-29 DIAGNOSIS — G43.909 MIGRAINE WITHOUT STATUS MIGRAINOSUS, NOT INTRACTABLE, UNSPECIFIED MIGRAINE TYPE: ICD-10-CM

## 2025-01-29 NOTE — TELEPHONE ENCOUNTER
LESLY WATSON FROM [facility]  [phone] CALLING REQUESTING THAT ALL OF THE PATIENT'S RX BE SENT TO [facility] MAIL ORDER SENT TO HOME FAX -[phone].

## 2025-01-30 RX ORDER — GABAPENTIN 800 MG/1
800 TABLET ORAL 3 TIMES DAILY
Qty: 90 TABLET | Refills: 3 | Status: CANCELLED | OUTPATIENT
Start: 2025-01-30 | End: 2025-05-30

## 2025-01-30 RX ORDER — CYCLOSPORINE 0.5 MG/ML
1 EMULSION OPHTHALMIC 2 TIMES DAILY PRN
Qty: 60 EACH | Status: CANCELLED | OUTPATIENT
Start: 2025-01-30

## 2025-01-30 RX ORDER — DICLOFENAC SODIUM 10 MG/G
4 GEL TOPICAL 4 TIMES DAILY PRN
Status: CANCELLED | OUTPATIENT
Start: 2025-01-30

## 2025-01-30 RX ORDER — GLUCOSAM/CHONDRO/HERB 149/HYAL 750-100 MG
1000 TABLET ORAL DAILY
Status: CANCELLED | OUTPATIENT
Start: 2025-01-30

## 2025-01-30 NOTE — TELEPHONE ENCOUNTER
Patient also has complaint that she is experiencing shakiness, unsteady, legs feel week x 2 weeks. Patient asking for advise/guidance on this. No open appmt. Please advise

## 2025-01-30 NOTE — TELEPHONE ENCOUNTER
Patient scheduled for a med follow up. Reviewed meds with patient that Dr. Smith fills for patient and removed meds from request that Dr. Smith does not fill. Patient is requesting coverage until upcoming appmt in April.

## 2025-01-30 NOTE — TELEPHONE ENCOUNTER
All medications but supplements populated      Prescription request received and populated   Pharmacy populated  Last Office Visit: 10/31/24 to establish care  Last Physical with Dr. Alexandre 4/03/24

## 2025-01-31 ENCOUNTER — TREATMENT (OUTPATIENT)
Dept: OCCUPATIONAL THERAPY | Facility: CLINIC | Age: 75
End: 2025-01-31
Payer: MEDICARE

## 2025-01-31 DIAGNOSIS — M18.11 UNILATERAL PRIMARY OSTEOARTHRITIS OF FIRST CARPOMETACARPAL JOINT, RIGHT HAND: Primary | ICD-10-CM

## 2025-01-31 PROCEDURE — 97140 MANUAL THERAPY 1/> REGIONS: CPT | Mod: GO | Performed by: OCCUPATIONAL THERAPIST

## 2025-01-31 PROCEDURE — 97530 THERAPEUTIC ACTIVITIES: CPT | Mod: GO | Performed by: OCCUPATIONAL THERAPIST

## 2025-01-31 PROCEDURE — 97110 THERAPEUTIC EXERCISES: CPT | Mod: GO | Performed by: OCCUPATIONAL THERAPIST

## 2025-01-31 ASSESSMENT — PAIN SCALES - GENERAL: PAINLEVEL_OUTOF10: 2

## 2025-01-31 ASSESSMENT — PAIN - FUNCTIONAL ASSESSMENT: PAIN_FUNCTIONAL_ASSESSMENT: 0-10

## 2025-01-31 ASSESSMENT — PAIN DESCRIPTION - DESCRIPTORS: DESCRIPTORS: SORE

## 2025-02-02 ENCOUNTER — HOSPITAL ENCOUNTER (OUTPATIENT)
Dept: RADIOLOGY | Facility: HOSPITAL | Age: 75
Discharge: HOME | End: 2025-02-02
Payer: COMMERCIAL

## 2025-02-02 DIAGNOSIS — M96.1 POSTLAMINECTOMY SYNDROME, CERVICAL REGION: ICD-10-CM

## 2025-02-02 DIAGNOSIS — M47.812 CERVICAL SPONDYLOSIS WITHOUT MYELOPATHY: ICD-10-CM

## 2025-02-02 DIAGNOSIS — M50.30 DDD (DEGENERATIVE DISC DISEASE), CERVICAL: ICD-10-CM

## 2025-02-02 DIAGNOSIS — M54.12 CERVICAL RADICULOPATHY: ICD-10-CM

## 2025-02-02 PROCEDURE — 72141 MRI NECK SPINE W/O DYE: CPT

## 2025-02-02 PROCEDURE — 72141 MRI NECK SPINE W/O DYE: CPT | Performed by: RADIOLOGY

## 2025-02-03 RX ORDER — OMEPRAZOLE 20 MG/1
20 CAPSULE, DELAYED RELEASE ORAL
Qty: 180 CAPSULE | Refills: 1 | Status: SHIPPED | OUTPATIENT
Start: 2025-02-03 | End: 2025-08-02

## 2025-02-03 RX ORDER — POLYETHYLENE GLYCOL 3350 17 G/17G
17 POWDER, FOR SOLUTION ORAL DAILY
Qty: 90 PACKET | Refills: 1 | Status: SHIPPED | OUTPATIENT
Start: 2025-02-03 | End: 2026-02-03

## 2025-02-03 RX ORDER — CYCLOBENZAPRINE HCL 5 MG
5 TABLET ORAL 3 TIMES DAILY PRN
Qty: 30 TABLET | Refills: 5 | Status: SHIPPED | OUTPATIENT
Start: 2025-02-03

## 2025-02-03 RX ORDER — DULOXETIN HYDROCHLORIDE 60 MG/1
60 CAPSULE, DELAYED RELEASE ORAL DAILY
Qty: 90 CAPSULE | Refills: 1 | Status: SHIPPED | OUTPATIENT
Start: 2025-02-03 | End: 2026-02-03

## 2025-02-03 RX ORDER — ESTRADIOL 0.1 MG/G
1 CREAM VAGINAL 2 TIMES WEEKLY
Qty: 42.5 G | Refills: 1 | Status: SHIPPED | OUTPATIENT
Start: 2025-02-03

## 2025-02-03 RX ORDER — HYDROXYZINE HYDROCHLORIDE 25 MG/1
25 TABLET, FILM COATED ORAL 3 TIMES DAILY
Qty: 90 TABLET | Refills: 1 | Status: SHIPPED | OUTPATIENT
Start: 2025-02-03

## 2025-02-03 RX ORDER — TOPIRAMATE 25 MG/1
25 TABLET ORAL DAILY
Qty: 90 TABLET | Refills: 1 | Status: SHIPPED | OUTPATIENT
Start: 2025-02-03 | End: 2026-02-03

## 2025-02-03 RX ORDER — TIZANIDINE 4 MG/1
4 TABLET ORAL EVERY 8 HOURS PRN
Qty: 90 TABLET | Refills: 2 | Status: SHIPPED | OUTPATIENT
Start: 2025-02-03

## 2025-02-04 ENCOUNTER — PATIENT MESSAGE (OUTPATIENT)
Dept: PAIN MEDICINE | Facility: CLINIC | Age: 75
End: 2025-02-04
Payer: MEDICARE

## 2025-02-05 ENCOUNTER — ANCILLARY PROCEDURE (OUTPATIENT)
Dept: RADIOLOGY | Facility: EXTERNAL LOCATION | Age: 75
End: 2025-02-05
Payer: MEDICARE

## 2025-02-05 ENCOUNTER — OUTSIDE PROCEDURE (OUTPATIENT)
Dept: RADIOLOGY | Facility: EXTERNAL LOCATION | Age: 75
End: 2025-02-05
Payer: MEDICARE

## 2025-02-05 DIAGNOSIS — M54.12 RADICULOPATHY, CERVICAL REGION: ICD-10-CM

## 2025-02-05 DIAGNOSIS — M96.1 POSTLAMINECTOMY SYNDROME, CERVICAL REGION: Primary | ICD-10-CM

## 2025-02-05 PROCEDURE — 62321 NJX INTERLAMINAR CRV/THRC: CPT | Performed by: ANESTHESIOLOGY

## 2025-02-05 NOTE — PROGRESS NOTES
Preprocedure diagnosis: Cervical radiculopathy, cervical postlaminectomy syndrome  Postprocedure diagnosis cervical radiculopathy, cervical postlaminectomy syndrome    Procedure performed: C7-T1 epidural steroid injection under fluoroscopic guidance    Physician: Yash Grace MD    Anesthesia: Light sedation    Complications: none    Blood loss:  none    Clinical note: This is a very pleasant 74-year-old female who suffers with neck and arm pain here meeting all medical criteria for above-mentioned procedure.    Procedure:      The patient was identified in the preoperative area.  The procedure was discussed in detail including its risks, benefits and alternatives.  Signed consent was obtained and the patient agreed to proceed.    The patient was brought to the [facility] procedure room and was positioned in the prone position onto the procedure room table.  A pillow was placed below the patient's chest.  A safety strap was placed across the legs.  The cervical region was then exposed, prepped and draped in the usual sterile fashion using 2% Chloroprep scrub.  After that, under fluoroscopic guidance in the AP view the C7-T1 intralaminar space was identified.  The intended entry point was marked onto the skin and then 10 ml of 2% preservative-free lidocaine was injected using a 25 gauge needle to anesthetize the skin and subcutaneous tissues along the intended needle trajectory.  Next, an 18 Tuohy needle was advanced under intermittent fluoroscopic guidance until bony contact was made with the lamina of T1.  The Tuohy needle was then walked off the lamina in a cephalad manner into the C7-T1 intralaminar space.  Using a loss of resistance technique, the epidural space was entered with ease.  Images were taken in the AP and contralateral oblique views.  The stylette was removed from the needle and the needle was aspirated, which was negative for heme and CSF. the needle was aspirated again for heme and CSF  which was negative.  Then after another negative aspirate, 10 mg of dexamethasone mixed with 4 mL of preservative-free normal saline was injected via the Tuohy needle.  The needle was then removed and a bandage was applied.  The patient tolerated the procedure well and was transferred back to the discharge area.  The patient was monitored for 15 minutes and vital signs were stable.  The patient was discharged home in stable condition with a .

## 2025-02-06 NOTE — PROGRESS NOTES
Preprocedure diagnosis: Cervical radiculopathy, cervical postlaminectomy syndrome  Postprocedure diagnosis cervical radiculopathy, cervical postlaminectomy syndrome     Procedure performed: C7-T1 epidural steroid injection under fluoroscopic guidance     Physician: Yash Grace MD     Anesthesia: Light sedation     Complications: none     Blood loss:  none     Clinical note: This is a very pleasant 74-year-old female who suffers with neck and arm pain here meeting all medical criteria for above-mentioned procedure.     Procedure:       The patient was identified in the preoperative area.  The procedure was discussed in detail including its risks, benefits and alternatives.  Signed consent was obtained and the patient agreed to proceed.     The patient was brought to the [facility] procedure room and was positioned in the prone position onto the procedure room table.  A pillow was placed below the patient's chest.  A safety strap was placed across the legs.  The cervical region was then exposed, prepped and draped in the usual sterile fashion using 2% Chloroprep scrub.  After that, under fluoroscopic guidance in the AP view the C7-T1 intralaminar space was identified.  The intended entry point was marked onto the skin and then 10 ml of 2% preservative-free lidocaine was injected using a 25 gauge needle to anesthetize the skin and subcutaneous tissues along the intended needle trajectory.  Next, an 18 Tuohy needle was advanced under intermittent fluoroscopic guidance until bony contact was made with the lamina of T1.  The Tuohy needle was then walked off the lamina in a cephalad manner into the C7-T1 intralaminar space.  Using a loss of resistance technique, the epidural space was entered with ease.  Images were taken in the AP and contralateral oblique views.  The stylette was removed from the needle and the needle was aspirated, which was negative for heme and CSF. the needle was aspirated again for heme  and CSF which was negative.  Then after another negative aspirate, 10 mg of dexamethasone mixed with 4 mL of preservative-free normal saline was injected via the Tuohy needle.  The needle was then removed and a bandage was applied.  The patient tolerated the procedure well and was transferred back to the discharge area.  The patient was monitored for 15 minutes and vital signs were stable.  The patient was discharged home in stable condition with a .                 Electronically signed by Yash Grace MD at 2/5/2025 11:27 AM

## 2025-02-11 ENCOUNTER — EVALUATION (OUTPATIENT)
Dept: PHYSICAL THERAPY | Facility: CLINIC | Age: 75
End: 2025-02-11
Payer: MEDICARE

## 2025-02-11 DIAGNOSIS — M96.1 POSTLAMINECTOMY SYNDROME, CERVICAL REGION: ICD-10-CM

## 2025-02-11 DIAGNOSIS — M47.812 CERVICAL SPONDYLOSIS: Primary | ICD-10-CM

## 2025-02-11 DIAGNOSIS — M50.30 DDD (DEGENERATIVE DISC DISEASE), CERVICAL: ICD-10-CM

## 2025-02-11 DIAGNOSIS — M54.12 CERVICAL RADICULOPATHY: ICD-10-CM

## 2025-02-11 DIAGNOSIS — M47.812 CERVICAL SPONDYLOSIS WITHOUT MYELOPATHY: ICD-10-CM

## 2025-02-11 PROCEDURE — 97110 THERAPEUTIC EXERCISES: CPT | Mod: GP | Performed by: PHYSICAL THERAPIST

## 2025-02-11 PROCEDURE — 97162 PT EVAL MOD COMPLEX 30 MIN: CPT | Mod: GP | Performed by: PHYSICAL THERAPIST

## 2025-02-11 SDOH — ECONOMIC STABILITY: GENERAL: QUALITY OF LIFE: GOOD

## 2025-02-11 ASSESSMENT — ENCOUNTER SYMPTOMS
PAIN SCALE AT HIGHEST: 10
ALLEVIATING FACTORS: CHANGE IN POSITION
ALLEVIATING FACTORS: HEAT
PAIN SCALE: 1
ALLEVIATING FACTORS: ICE
QUALITY: SHARP
QUALITY: DULL ACHE
LOSS OF SENSATION IN FEET: 1
EXACERBATED BY: LIFTING
EXACERBATED BY: OVERHEAD ACTIVITY
PAIN SCALE AT LOWEST: 0
EXACERBATED BY: KEYBOARDING
EXACERBATED BY: MOVEMENT

## 2025-02-11 NOTE — PROGRESS NOTES
"Physical Therapy Evaluation and Treatment     Patient Name: Pauline Orta \"Megan"  MRN: [id number]  Encounter date: 2025  Time Calculation  Start Time: 0935  Moderate complexity due to patient's clinical presentation being evolving with changing characteristics, with comorbidities/complexities to include chronic pain syndrome, cervical fusions, OA, spinal stenosis, Fibromyalgia, GERD, migraine, and peripheral neuropathy, all of which may negatively impact rehab tolerance and progression.     Visit # 1 of 10  Visits/Dates Authorized: : 1) EVAL ONLY - [facility] - AUTH THRU [facility] / $9350 OOP not met / 80% COVERAGE / [place] VISITS / ds 2/10/25. 2) [employer] MY CARE - NO AUTH / 100% coverage / per [facility] w/s.   Insurance Type: Payor: [employer] / Plan: [employer] ([email or web address]) DUAL ADVANTAGE / Product Type: *No Product type* /     Current Problem:   Problem List Items Addressed This Visit             ICD-10-CM    Cervical spondylosis - Primary M47.812    Postlaminectomy syndrome, cervical region M96.1    Relevant Orders    Follow Up In Physical Therapy    Cervical radiculopathy M54.12    Relevant Orders    Follow Up In Physical Therapy     Other Visit Diagnoses         Codes    Cervical spondylosis without myelopathy     M47.812    Relevant Orders    Follow Up In Physical Therapy    DDD (degenerative disc disease), cervical     M50.30    Relevant Orders    Follow Up In Physical Therapy          Precautions:  Precautions  Medical Precautions: Fall precautions       Subjective    Subjective Evaluation    History of Present Illness  Date of onset: 2025  Date of surgery: 2025 (25 injection C7-T1 epidural injection)  Mechanism of injury: Pt with long History of cervical stenosis, lamiectomy and fusions.  Pt with recent injection that helped to alleviate some pain.  Pt reports R shoulder pain that is radiating to middle deltoid ( pt with H/O R TSR and R RCR).      Quality of life: good    Pain  Current pain ratin  At " best pain ratin  At worst pain rating: 10  Location: R neck aR upperarm  Quality: sharp and dull ache  Relieving factors: change in position, ice and heat  Aggravating factors: keyboarding, lifting, movement and overhead activity    Social Support  Lives in: apartment (no steps to enter laundry on same floor)    Hand dominance: right    Treatments  Previous treatment: physical therapy  Current treatment: injection treatment  Patient Goals  Patient goals for therapy: decreased pain, increased motion and increased strength  Patient goal: Keep pain level down         Objective          Objective     Postural Observations  Seated posture: good  Standing posture: good    Additional Postural Observation Details  Pt with guarded cervical mobility     Palpation     Right   Hypertonic in the levator scapulae, scalenes and upper trapezius. Tenderness of the cervical paraspinals and deltoid.     Tenderness     Additional Tenderness Details  Bicipital groove R UE    Active Range of Motion     Additional Active Range of Motion Details  Cervical AROM  R lateral bend 5 degrees with increased pain R  R rotation 27 degrees    L lateral bend 15 degrees with R neck pain  L rotation 20 degrees     Shoulder AROM:  R flex 60 degrees pain  R Ext 35 degrees pain  R Abd 44 degrees pain  R HBH unable to do  R HBB to hip    L flex 143 degrees  L Ext 53 degrees  L  degrees  LR full   MR to T11    Strength/Myotome Testing     Left Shoulder     Planes of Motion   Flexion: 4   Abduction: 4   External rotation at 0°: 4+   Internal rotation at 0°: 4+     Right Shoulder     Planes of Motion   Flexion: 2-   Abduction: 3-   External rotation at 0°: 3-   Internal rotation at 0°: 3+     Left Elbow   Flexion: 5  Extension: 5    Right Elbow   Flexion: 4  Extension: 3+                  Outcome Measures:  Other Measures  Neck Disability Index: 23/50     Treatments:  Therapeutic Exercise  Therapeutic Exercise Performed: Yes  Therapeutic Exercise  Activity 1: reviewed use of heat and towel roll  Therapeutic Exercise Activity 2: shoulder shrugs x10  Therapeutic Exercise Activity 3: scap retraction x10 3 ct hold  Therapeutic Exercise Activity 4: retro rolls x10    HEP / Access Codes:   Shoulder shrugs  Scap retraction  Retro rolls  Assessment   Assessment & Plan     Assessment  Impairments: abnormal muscle tone, abnormal or restricted ROM, activity intolerance, impaired physical strength, lacks appropriate home exercise program and pain with function  Assessment details: Pt is a 75 yo F with h/o cervical fusions, fibromyalgia, peripheral neuropathy, R TSR and R RCR.  Pt presents today with increasing R sided neck pain with R upper arm pain.  Pt with recent C7-T1 epidural injection on 2/5/25 with some relief of pain; however, is experiencing R UT spasm today.  Pt with pain, decreased AROM of cervical spine R lateral flex and L rotation.  Pt with decreased R shoulder AROM in all planes with pain in shoulder.  Pt with 3-/5 strength R shoulder.  Pt with increased tenderness R UT and R shoulder biceps tendon.  Pt will benefit from Skilled PT to address pain, AROM neck/shoulder, increase strength UE/postural muscles, in order to increase mobility with daily activities.  Pt has an appointment for her shoulder coming up.  Prognosis: good  Prognosis details: Pt with positive response to therapy in the past    Goals  Keep pain level down    Plan  Therapy options: will be seen for skilled physical therapy services  Planned modality interventions: TENS  Other planned modality interventions: Dry Needling, K Tape  Planned therapy interventions: abdominal trunk stabilization, balance/weight-bearing training, functional ROM exercises, home exercise program, manual therapy, neuromuscular re-education, postural training, soft tissue mobilization, strengthening, stretching and therapeutic activities  Frequency: 2x/week for 2 weeks then 1x/week for 6 weeks.  Duration in visits:  10  Treatment plan discussed with: patient           Goals:   Active       PT Problem       PT Goals       Start:  02/11/25    Expected End:  05/12/25       STG's:    1.  Pt will be Independent with HEP in order to return to prior level of function and manage current condition  2.  Pt to report decreased pain in R neck/shoulder from 5 </= 1/10 in order to participate in household activities, daily activities, and perform grooming/hygiene  3.  Pt will improve AROM of R shoulder, cervical R lateral flex and L Rotation in order to complete activities of daily living    LTG'S:  1.  Pt will increase strength of R UE from 3- to 4/5 in order to complete household activities, daily activities, and perform grooming/hygiene  2.  Pt will improve score on NDI from 23/50 to 18/50 to demonstrate improved ability to manage daily living.          Patient Stated Goal 1       Start:  02/11/25    Expected End:  05/12/25       Move pain free

## 2025-02-13 ENCOUNTER — DOCUMENTATION (OUTPATIENT)
Dept: PHYSICAL THERAPY | Facility: CLINIC | Age: 75
End: 2025-02-13
Payer: MEDICARE

## 2025-02-13 ENCOUNTER — TELEPHONE (OUTPATIENT)
Dept: PRIMARY CARE | Facility: CLINIC | Age: 75
End: 2025-02-13

## 2025-02-13 DIAGNOSIS — M54.12 CERVICAL RADICULOPATHY: ICD-10-CM

## 2025-02-13 NOTE — PROGRESS NOTES
"Physical Therapy                 Therapy Communication Note    Patient Name: Pauline Orta \"Megan"  MRN: [id number]    Today's Date: 2/13/2025     Discipline: Physical Therapy          Missed Visit Reason:  NO SHOW    Missed Time: No Show    Comment:  Called pt regarding no show and remind pt of next scheduled appointment  "

## 2025-02-18 ENCOUNTER — OFFICE VISIT (OUTPATIENT)
Dept: PRIMARY CARE | Facility: CLINIC | Age: 75
End: 2025-02-18
Payer: MEDICARE

## 2025-02-18 VITALS
HEIGHT: 63 IN | OXYGEN SATURATION: 99 % | SYSTOLIC BLOOD PRESSURE: 146 MMHG | TEMPERATURE: 97.8 F | WEIGHT: 148.6 LBS | DIASTOLIC BLOOD PRESSURE: 98 MMHG | HEART RATE: 78 BPM | BODY MASS INDEX: 26.33 KG/M2

## 2025-02-18 DIAGNOSIS — F41.9 ANXIETY: Primary | ICD-10-CM

## 2025-02-18 DIAGNOSIS — R73.01 IMPAIRED FASTING BLOOD SUGAR: ICD-10-CM

## 2025-02-18 DIAGNOSIS — R61 DIAPHORESIS: ICD-10-CM

## 2025-02-18 DIAGNOSIS — G43.909 MIGRAINE WITHOUT STATUS MIGRAINOSUS, NOT INTRACTABLE, UNSPECIFIED MIGRAINE TYPE: ICD-10-CM

## 2025-02-18 PROCEDURE — 99214 OFFICE O/P EST MOD 30 MIN: CPT | Performed by: FAMILY MEDICINE

## 2025-02-18 PROCEDURE — 1158F ADVNC CARE PLAN TLK DOCD: CPT | Performed by: FAMILY MEDICINE

## 2025-02-18 PROCEDURE — G2211 COMPLEX E/M VISIT ADD ON: HCPCS | Performed by: FAMILY MEDICINE

## 2025-02-18 PROCEDURE — 1123F ACP DISCUSS/DSCN MKR DOCD: CPT | Performed by: FAMILY MEDICINE

## 2025-02-18 PROCEDURE — 1125F AMNT PAIN NOTED PAIN PRSNT: CPT | Performed by: FAMILY MEDICINE

## 2025-02-18 PROCEDURE — 1159F MED LIST DOCD IN RCRD: CPT | Performed by: FAMILY MEDICINE

## 2025-02-18 RX ORDER — ONDANSETRON 4 MG/1
4 TABLET, ORALLY DISINTEGRATING ORAL EVERY 8 HOURS PRN
Qty: 20 TABLET | Refills: 0 | Status: SHIPPED | OUTPATIENT
Start: 2025-02-18 | End: 2025-02-25

## 2025-02-18 RX ORDER — DULOXETINE 40 MG/1
40 CAPSULE, DELAYED RELEASE ORAL DAILY
Qty: 90 CAPSULE | Refills: 1 | Status: SHIPPED | OUTPATIENT
Start: 2025-02-18 | End: 2026-02-18

## 2025-02-18 RX ORDER — COVID-19 VACCINE, MRNA 0.04 MG/.418ML
INJECTION, SUSPENSION INTRAMUSCULAR
COMMUNITY
Start: 2024-09-05

## 2025-02-18 RX ORDER — INFLUENZA A VIRUS A/VICTORIA/4897/2022 IVR-238 (H1N1) ANTIGEN (FORMALDEHYDE INACTIVATED), INFLUENZA A VIRUS A/CALIFORNIA/122/2022 SAN-022 (H3N2) ANTIGEN (FORMALDEHYDE INACTIVATED), AND INFLUENZA B VIRUS B/MICHIGAN/01/2021 ANTIGEN (FORMALDEHYDE INACTIVATED) 60; 60; 60 UG/.5ML; UG/.5ML; UG/.5ML
INJECTION, SUSPENSION INTRAMUSCULAR
COMMUNITY
Start: 2024-09-05

## 2025-02-18 ASSESSMENT — LIFESTYLE VARIABLES: HOW MANY STANDARD DRINKS CONTAINING ALCOHOL DO YOU HAVE ON A TYPICAL DAY: PATIENT DOES NOT DRINK

## 2025-02-18 ASSESSMENT — PAIN SCALES - GENERAL: PAINLEVEL_OUTOF10: 6

## 2025-02-18 NOTE — PROGRESS NOTES
"History Of Present Illness  Pauline Orta \"Megan" is a 74 y.o. female presenting for Dizziness (Concerns with unsteady gait, clammy, dizziness, sweating profusely. Sx started a few months ago./Concerns with gabapentin affecting memory; would like to try Lyrica.)  . Here with home aid [facility].    HPI     Last 3-4 weeks has not been feeling well.   Breaking out in sweats randomly. Dripping wet.      Past Medical History  Patient Active Problem List    Diagnosis Date Noted    Cervical radiculopathy 02/11/2025    Unilateral primary osteoarthritis of first carpometacarpal joint, right hand 12/13/2024    Alcohol use disorder, severe, in sustained remission (Multi) 10/31/2024    Left foot pain 08/29/2024    Irritable bowel syndrome 04/03/2024    Unintended weight loss 04/03/2024    Family history of dementia 04/03/2024    Cervical spondylosis 01/26/2024    Foraminal stenosis of cervical region 01/26/2024    Status post total shoulder arthroplasty, right 12/08/2023    Gastroesophageal reflux disease 11/03/2023    Arthritis of right shoulder region 11/03/2023    Anxiety 09/27/2023    Arthritis of right glenohumeral joint 09/27/2023    Chronic pain 09/27/2023    Osteoarthritis 09/27/2023    Migraine headache 08/12/2023    KALLI (obstructive sleep apnea) 04/29/2020    Osteopenia, senile 05/09/2019    Fibromyalgia 10/24/2012    Spinal stenosis of lumbar region 09/26/2012    Degeneration of lumbar or lumbosacral intervertebral disc 07/15/2011    Postlaminectomy syndrome, cervical region 11/24/2008    Primary insomnia 10/10/2006        Medications  Current Outpatient Medications   Medication Sig Dispense Refill    ascorbic acid (Vitamin C) 500 mg tablet Take 1 tablet (500 mg) by mouth once daily.      calcium citrate-vitamin D2 250 mg-2.5 mcg (100 unit) tablet Take 1 tablet by mouth once daily.      cholecalciferol (Vitamin D-3) 125 MCG (5000 UT) capsule Take 1 capsule (125 mcg) by mouth once daily.      Comirnaty 2024-25, 12y " up,,PF, 30 mcg/0.3 mL syringe       cyanocobalamin (Vitamin B-12) 250 mcg tablet Take 1 tablet (250 mcg) by mouth once daily.      diclofenac sodium (Voltaren) 1 % gel gel Apply 4.5 inches (4 g) topically 4 times a day as needed.      estradiol (Estrace) 0.01 % (0.1 mg/gram) vaginal cream Insert 0.25 Applicatorfuls (1 g) into the vagina 2 times a week. INSERT 1 GRAM VAGINALLY 2 TIMES A WEEK 42.5 g 1    Fluzone High-Dose Triv 24-25 syringe       gabapentin (Neurontin) 800 mg tablet Take 1 tablet (800 mg) by mouth 3 times a day. 90 tablet 3    hydrOXYzine HCL (Atarax) 25 mg tablet Take 1 tablet (25 mg) by mouth 3 times a day. 90 tablet 1    multivitamin tablet Take 1 tablet by mouth once daily.      omega 3-dha-epa-fish oil (Fish OiL) 1,000 mg (120 mg-180 mg) capsule Take 1 capsule (1,000 mg) by mouth once daily.      omeprazole (PriLOSEC) 20 mg DR capsule Take 1 capsule (20 mg) by mouth 2 times a day before meals. Do not crush or chew. 180 capsule 1    polyethylene glycol (Glycolax, Miralax) 17 gram packet Take 17 g by mouth once daily. 90 packet 1    Restasis 0.05 % ophthalmic emulsion Administer 1 drop into both eyes 2 times a day as needed (dry eyes).      rimegepant (Nurtec ODT) 75 mg tablet,disintegrating Dissolve 1 tablet (75 mg) in the mouth every other day. 27 tablet 1    tiZANidine (Zanaflex) 4 mg tablet Take 1 tablet (4 mg) by mouth every 8 hours if needed for muscle spasms. 90 tablet 2    topiramate (Topamax) 25 mg tablet Take 1 tablet (25 mg) by mouth once daily. 90 tablet 1    TUMERIC-GING-OLIVE-OREG-CAPRYL ORAL Take 1 mg by mouth once daily.      DULoxetine 40 mg DR capsule Take 1 capsule (40 mg) by mouth once daily. 90 capsule 1    ondansetron ODT (Zofran-ODT) 4 mg disintegrating tablet Dissolve 1 tablet (4 mg) in the mouth every 8 hours if needed for nausea or vomiting for up to 7 days. 20 tablet 0     No current facility-administered medications for this visit.        Surgical History  She has a past  surgical history that includes MR angio head wo IV contrast (10/14/2019); Gastric bypass; Cervical fusion; Hysterectomy; Cataract extraction; Cervical fusion; Carpal tunnel release (Right); Trigger finger release (Right); Laminectomy; Neck surgery; orthopedic surgery; Spinal fusion; Joint replacement; and Total shoulder arthroplasty (Right, 2023).     Social History  She reports that she quit smoking about 34 years ago. Her smoking use included cigarettes. She started smoking about 44 years ago. She has a 2.5 pack-year smoking history. She has never used smokeless tobacco. She reports that she does not currently use alcohol. She reports that she does not use drugs.    Family History  Family History   Problem Relation Name Age of Onset    Diabetes Mother Racquel Gonzalesrjohana     Arthritis Mother Racquel Dinero     Osteoporosis Mother Racquel Dinero     Cancer Father Ahsan Gonzalesrjohana     Lung disease Father Ahsan Gonzalesrn     Heart disease Father Ahsan Gonzalesrn     Alcohol abuse Father Ahsan Gonzalesrn     Alcohol abuse Brother Cr Gonzalesrjohana     Alcohol abuse Sister Mary Jones     Diabetes Son Billy Orta     Drug abuse Brother Cr         Allergies  Aspirin, Iodinated contrast media, Lithium, Shellfish derived, Adhesive, and Caffeine    Immunizations  Immunization History   Administered Date(s) Administered    COVID-19, mRNA, LNP-S, PF, 30 mcg/0.3 mL dose 02/24/2021, 03/17/2021, 09/27/2021    Flu vaccine, quadrivalent, high-dose, preservative free, age 65y+ (FLUZONE) 09/17/2020, 07/26/2022, 09/07/2023    Flu vaccine, trivalent, preservative free, HIGH-DOSE, age 65y+ (Fluzone) 09/09/2014, 09/24/2015, 09/19/2017, 09/25/2019, 09/05/2024    Flu vaccine, trivalent, preservative free, no egg protein, age 6 months or greater (Flucelvax) 09/27/2017    Influenza, Seasonal, Quadrivalent, Adjuvanted 09/15/2021, 07/26/2022    Influenza, Unspecified 09/07/2013    Influenza, seasonal, injectable 09/17/2013, 09/13/2016     "Novel influenza-H1N1-09, preservative-free 11/15/2009    Pfizer COVID-19 vaccine, 12 years and older, (30mcg/0.3mL) (Comirnaty) 10/19/2023, 09/05/2024    Pfizer COVID-19 vaccine, bivalent, age 12 years and older (30 mcg/0.3 mL) 09/24/2022    Pfizer Gray Cap SARS-CoV-2 04/05/2022    Pneumococcal conjugate vaccine, 13-valent (PREVNAR 13) 02/05/2016    Pneumococcal conjugate vaccine, 20-valent (PREVNAR 20) 04/03/2024    Pneumococcal polysaccharide vaccine, 23-valent, age 2 years and older (PNEUMOVAX 23) 03/09/2012, 02/09/2017    RESPIRATORY SYNCYTIAL VIRUS (RSV), ELIGIBLE PREGNANT PTS, 0.5 ML (ABRYSVO) 10/19/2023    Tdap vaccine, age 7 year and older (BOOSTRIX, ADACEL) 05/06/2013    Zoster vaccine, recombinant, adult (SHINGRIX) 07/30/2018, 10/15/2018        ROS  Negative, except as discussed in HPI     Vitals  BP (!) 146/98   Pulse 78   Temp 36.6 °C (97.8 °F)   Ht 1.6 m (5' 3\")   Wt 67.4 kg (148 lb 9.6 oz)   SpO2 99%   BMI 26.32 kg/m²      Physical Exam    Relevant Results  Lab Results   Component Value Date    WBC 9.6 01/26/2024    WBC 6.2 10/23/2023    HGB 12.6 01/26/2024    HGB 12.3 10/23/2023    HCT 39.8 01/26/2024    HCT 38.8 10/23/2023    MCV 95 01/26/2024    MCV 96 10/23/2023     01/26/2024     10/23/2023     Lab Results   Component Value Date     11/07/2024     11/04/2023    K 4.4 11/07/2024    K 4.3 11/04/2023     11/07/2024     11/04/2023    CO2 30 11/07/2024    CO2 24 11/04/2023    BUN 21 11/07/2024    BUN 16 11/04/2023    CREATININE 0.81 11/07/2024    CREATININE 0.80 11/04/2023    CALCIUM 9.5 11/07/2024    CALCIUM 9.0 11/04/2023    PROT 7.1 11/07/2024    BILITOT 0.4 11/07/2024    ALKPHOS 82 11/07/2024    ALT 15 11/07/2024    AST 20 11/07/2024    GLUCOSE 86 11/07/2024    GLUCOSE 91 11/04/2023     Lab Results   Component Value Date    HGBA1C 5.7 (H) 11/07/2024     Lab Results   Component Value Date    TSH 1.41 11/07/2024      Lab Results   Component Value Date    " "CHOL 217 (H) 11/07/2024    TRIG 122 11/07/2024    HDL 61.8 11/07/2024           Assessment/Plan   Pauline \"Rebecca\" was seen today for dizziness.  Diagnoses and all orders for this visit:  Anxiety (Primary)  -     DULoxetine 40 mg DR capsule; Take 1 capsule (40 mg) by mouth once daily.  Migraine without status migrainosus, not intractable, unspecified migraine type  -     ondansetron ODT (Zofran-ODT) 4 mg disintegrating tablet; Dissolve 1 tablet (4 mg) in the mouth every 8 hours if needed for nausea or vomiting for up to 7 days.  Diaphoresis  -     CBC; Future  -     Comprehensive Metabolic Panel; Future  -     TSH with reflex to Free T4 if abnormal; Future  -     CBC  -     Comprehensive Metabolic Panel  -     TSH with reflex to Free T4 if abnormal  Impaired fasting blood sugar  -     Hemoglobin A1C; Future  -     Hemoglobin A1C         Counseling:   Medication education:   -Education:  The patient is counseled regarding potential side-effects of any and all new medications  -Understanding:  Patient expressed understanding of information discussed today  -Adherence:  No barriers to adherence identified    Final treatment plan is a result of shared decision making with patient.         Jaime Smith MD   " any and all new medications  -Understanding:  Patient expressed understanding of information discussed today  -Adherence:  No barriers to adherence identified    Final treatment plan is a result of shared decision making with patient.         Jaime mSith MD

## 2025-02-18 NOTE — PATIENT INSTRUCTIONS
Anxiety  -Hydroxyzine is just as needed for anxiety, not scheduled  -We decreased duloxetine from 60mg to 40mg to reduce side effect of sweating  -Get labs done if no improvement    Try to avoid muscle relaxer at night for sleep unless in significant pain.

## 2025-02-20 ENCOUNTER — OFFICE VISIT (OUTPATIENT)
Dept: PAIN MEDICINE | Facility: CLINIC | Age: 75
End: 2025-02-20
Payer: MEDICARE

## 2025-02-20 VITALS
OXYGEN SATURATION: 98 % | DIASTOLIC BLOOD PRESSURE: 70 MMHG | RESPIRATION RATE: 16 BRPM | WEIGHT: 148 LBS | HEIGHT: 63 IN | HEART RATE: 70 BPM | BODY MASS INDEX: 26.22 KG/M2 | SYSTOLIC BLOOD PRESSURE: 110 MMHG

## 2025-02-20 DIAGNOSIS — M96.1 POSTLAMINECTOMY SYNDROME, CERVICAL REGION: ICD-10-CM

## 2025-02-20 DIAGNOSIS — M54.12 CERVICAL RADICULOPATHY: Primary | ICD-10-CM

## 2025-02-20 PROCEDURE — 1123F ACP DISCUSS/DSCN MKR DOCD: CPT | Performed by: ANESTHESIOLOGY

## 2025-02-20 PROCEDURE — 99214 OFFICE O/P EST MOD 30 MIN: CPT | Performed by: ANESTHESIOLOGY

## 2025-02-20 PROCEDURE — G2211 COMPLEX E/M VISIT ADD ON: HCPCS | Performed by: ANESTHESIOLOGY

## 2025-02-20 PROCEDURE — 1160F RVW MEDS BY RX/DR IN RCRD: CPT | Performed by: ANESTHESIOLOGY

## 2025-02-20 PROCEDURE — 1036F TOBACCO NON-USER: CPT | Performed by: ANESTHESIOLOGY

## 2025-02-20 PROCEDURE — 1159F MED LIST DOCD IN RCRD: CPT | Performed by: ANESTHESIOLOGY

## 2025-02-20 RX ORDER — PREGABALIN 225 MG/1
225 CAPSULE ORAL 2 TIMES DAILY
Qty: 60 CAPSULE | Refills: 0 | Status: SHIPPED | OUTPATIENT
Start: 2025-02-20 | End: 2025-03-22

## 2025-02-20 ASSESSMENT — ENCOUNTER SYMPTOMS
CONSTITUTIONAL NEGATIVE: 1
ARTHRALGIAS: 1
MYALGIAS: 1
NUMBNESS: 0
CARDIOVASCULAR NEGATIVE: 1
EYES NEGATIVE: 1
HEMATOLOGIC/LYMPHATIC NEGATIVE: 1
WEAKNESS: 1
JOINT SWELLING: 1
ALLERGIC/IMMUNOLOGIC NEGATIVE: 1
HEADACHES: 1
PSYCHIATRIC NEGATIVE: 1
GASTROINTESTINAL NEGATIVE: 1
BACK PAIN: 1
NECK STIFFNESS: 1
NECK PAIN: 1
ENDOCRINE NEGATIVE: 1
RESPIRATORY NEGATIVE: 1

## 2025-02-20 ASSESSMENT — PAIN DESCRIPTION - DESCRIPTORS: DESCRIPTORS: ACHING

## 2025-02-20 ASSESSMENT — PAIN SCALES - GENERAL
PAINLEVEL_OUTOF10: 5
PAINLEVEL_OUTOF10: 5 - MODERATE PAIN

## 2025-02-20 ASSESSMENT — PAIN - FUNCTIONAL ASSESSMENT: PAIN_FUNCTIONAL_ASSESSMENT: 0-10

## 2025-02-20 NOTE — PROGRESS NOTES
"Subjective   Patient ID: Pauline Orta \"Megan" is a 74 y.o. female who presents for Neck Pain.  Neck Pain   Associated symptoms include headaches and weakness. Pertinent negatives include no numbness.     Patient here today for follow up from her C7/T1 CARINE.  She reports that she had 100% relief for several weeks.  She reports having a grab in her neck and started having some pain on the right side again.  She is still sustaining > 50% relief at this time.    The patient questions if there would be a different medication and she would be open to transitioning off of her gabapentin.  She also would like a surgical consultation.  She does understand at her advanced age and the extensive surgery she had over 20 years ago but the pain is very debilitating to her and she would like to know if there is any potential option or she could should continue with intervention, medication and rehabilitation.  She reports no new numbness, tingling, weakness at this time.  Review of Systems   Constitutional: Negative.    HENT: Negative.     Eyes: Negative.    Respiratory: Negative.     Cardiovascular: Negative.    Gastrointestinal: Negative.    Endocrine: Negative.    Genitourinary: Negative.    Musculoskeletal:  Positive for arthralgias, back pain, joint swelling, myalgias, neck pain and neck stiffness.   Skin: Negative.    Allergic/Immunologic: Negative.    Neurological:  Positive for weakness and headaches. Negative for numbness.   Hematological: Negative.    Psychiatric/Behavioral: Negative.         Objective   Physical Exam  Vitals and nursing note reviewed.   Constitutional:       Appearance: Normal appearance.   HENT:      Head: Normocephalic and atraumatic.      Right Ear: Ear canal and external ear normal.      Left Ear: Ear canal and external ear normal.      Nose: Nose normal.      Mouth/Throat:      Mouth: Mucous membranes are moist.      Pharynx: Oropharynx is clear.   Eyes:      Conjunctiva/sclera: Conjunctivae " normal.      Pupils: Pupils are equal, round, and reactive to light.   Neck:     Cardiovascular:      Rate and Rhythm: Normal rate.   Pulmonary:      Effort: Pulmonary effort is normal. No respiratory distress.   Musculoskeletal:      Right hand: Deformity, tenderness and bony tenderness present. Decreased range of motion. Decreased strength.      Left hand: Deformity, tenderness and bony tenderness present. Decreased range of motion. Decreased strength.      Cervical back: Neck supple. Pain with movement and muscular tenderness present. No spinous process tenderness. Decreased range of motion.      Lumbar back: Tenderness present. Normal range of motion.   Lymphadenopathy:      Cervical: No cervical adenopathy.   Skin:     General: Skin is warm and dry.   Neurological:      Mental Status: She is alert.   Psychiatric:         Mood and Affect: Mood normal.         Thought Content: Thought content normal.         Assessment/Plan   Problem List Items Addressed This Visit             ICD-10-CM    Postlaminectomy syndrome, cervical region M96.1    Cervical radiculopathy - Primary M54.12        I nice discussion with the patient today our plan will be as follows.    Radiology: MRI reviewed today.    Physically: Patient to continue with physical therapy as it has been helpful.  I will renew prescription if she is needed.    Psychologically: No issues at this time.    Medication: We will transition the patient over to Lyrica 225 mg twice daily from her gabapentin.  Patient was in agreement.  A PDMP report was checked today, and was consistent with reported prescribing.    Duration: Greater than 6 months note    Intervention: Patient continues to do well with C6-7 epidural steroid injection under fluoroscopic guidance.  We can repeat these 12 weeks after her initial injection on 123 as long as she sustains greater than 50% relief per Medicare guidelines.    Patient was interested in having a surgical consultation I will  refer her to Dr. Ventura to go over her imaging and consider any potential surgical interventions that would help her with her neck and radiating pain.        Please note that this report has been produced using speech recognition software. It may contain errors related to grammar, punctuation or spelling. Electronically signed, but not reviewed.       Yash Grace MD 02/20/25 2:31 PM

## 2025-02-22 NOTE — PROGRESS NOTES
Subjective    Patient ID: Rebecca Orta is a 74 y.o. female.    Chief Complaint: No chief complaint on file.     Last Surgery: No surgery found  Last Surgery Date: No surgery found    HPI  Pauline is a 74 y.o. right hand dominant female presenting today for evaluation of their right shoulder. This is a second opinion visit.      She explains that she had a right reverse done 5 months ago in November 2023 with Dr. Castillo. She was allergic to the surgical tape but had no other post operative complications. Initially she improved, but as she did PT she had increasing pain. She saw Dr. Herrera two weeks ago who found inflammatory markers. Her pain is mainly with external rotation, pushing and lifting.    Her left shoulder is not bothersome today, but she does note she has arthritis in most of her joints.     2/24/25  Pauline Orta is a 74 y.o. female returning to the clinic for a follow up visit regarding her right shoulder.    She explains that since surgery she has had pain in her right arm. She has had a full workup and has been seen by us an Dr. Trejo, and nothing was noted. Dr. Del Valle ordered her CT scan and advised she follow up with us.     Past medical history, surgical history, social history, and family history were all reviewed and are as per the blue patient health history questionnaire form that I signed and scanned into the chart today.        Objective   Ortho Exam  Patient is a well-developed, well-nourished female in no acute distress.  Breathes with normal chest rises.  Pupils are round and symmetric today.  Awake, alert, and oriented x3.      Examination of the left shoulder today reveals the skin to be intact. There is no sign of any atrophy, lesions, or abrasions. There is no pain to palpation of the bony prominences. Cervical lymphadenopathy examined, and this was negative. Patient had 5 out of 5 wrist flexion, extension, and thumb extension bilaterally. Sensation was intact to  light touch to median, ulnar, radial axillary, and musculocutaneous nerves bilaterally. Positive radial pulse bilaterally. Provocative maneuvers on the left side today were negative.  Range of motion of the left shoulder revealed 0-170° of forward elevation, 0-60° of external rotation, and internal rotation was to T-12.     Examination of the right shoulder today reveals the skin to be intact. There is no sign of any atrophy, lesions, or abrasions. Pain to palpation along the scapula, posterior glenohumeral joint. Cervical lymphadenopathy examined, and this was negative. Patient had 5 out of 5 wrist flexion, extension, and thumb extension, bilaterally. Sensation was intact to light touch to median, ulnar, radial, axillary, and musculocutaneous nerves bilaterally. Positive radial pulse bilaterally. Provocative maneuvers on the right side today were negative.  Range of motion of the right shoulder revealed 0-80° of forward elevation, correctable to 100°. 0-30° of external rotation, and internal rotation up to her side.  3+/5 strength        Image Results:  FL fluoro images no charge  These images are not reportable by radiology and will not be interpreted   by  Radiologists.      Assessment/Plan   Encounter Diagnoses:  No diagnosis found.  Patient is 1+ year s/p right reverse done by a very good surgeon. Now having different pain after surgery    We had a long discussion regarding her options today. She may continue to live with this. If she can no longer live with this we may consider an arthroscopic surgery to see if she is dislocating. This would require her to complete ESR, CRP and CBC with diff as well as a CT scan. She understands there are no guarantees to surgery.     The risks, benefits and alternatives to surgery include infection, nerve injury and bleeding. Recovery and rehabilitation can take up to 3 months. They will be in a sling for 2 weeks after surgery. They will also be non-weight bearing for the  first 2 weeks. No lifting more than 5lbs for the first 8 weeks. It is recommended they be out of works for at least a few weeks, unless they work a manual job, then they will need to be off for 2-3 months. Cold therapy was discussed today. All of the above was discussed with the patient and they agree to proceed.    We will get them scheduled for her pre operative appointment with YARITZA Ly.     PLAN will be for RIGHT arthroscopic decompression; Debridement; Culture and biopsy.       No orders of the defined types were placed in this encounter.    Follow up will be scheduled appropriately.   Scribe Attestation  By signing my name below, Daria FINLEY Scribe   attest that this documentation has been prepared under the direction and in the presence of Emilio Simpson MD.

## 2025-02-24 ENCOUNTER — APPOINTMENT (OUTPATIENT)
Dept: ORTHOPEDIC SURGERY | Facility: CLINIC | Age: 75
End: 2025-02-24
Payer: MEDICARE

## 2025-02-24 DIAGNOSIS — Z01.818 PRE-OP TESTING: Primary | ICD-10-CM

## 2025-02-24 PROCEDURE — 99214 OFFICE O/P EST MOD 30 MIN: CPT | Performed by: ORTHOPAEDIC SURGERY

## 2025-02-24 PROCEDURE — 1123F ACP DISCUSS/DSCN MKR DOCD: CPT | Performed by: ORTHOPAEDIC SURGERY

## 2025-02-25 ENCOUNTER — TREATMENT (OUTPATIENT)
Dept: PHYSICAL THERAPY | Facility: CLINIC | Age: 75
End: 2025-02-25
Payer: MEDICARE

## 2025-02-25 DIAGNOSIS — M47.812 CERVICAL SPONDYLOSIS WITHOUT MYELOPATHY: ICD-10-CM

## 2025-02-25 DIAGNOSIS — M54.12 CERVICAL RADICULOPATHY: ICD-10-CM

## 2025-02-25 DIAGNOSIS — M96.1 POSTLAMINECTOMY SYNDROME, CERVICAL REGION: ICD-10-CM

## 2025-02-25 DIAGNOSIS — M50.30 DDD (DEGENERATIVE DISC DISEASE), CERVICAL: ICD-10-CM

## 2025-02-25 PROCEDURE — 97140 MANUAL THERAPY 1/> REGIONS: CPT | Mod: GP | Performed by: PHYSICAL THERAPIST

## 2025-02-25 NOTE — PROGRESS NOTES
Physical Therapy Treatment    Patient Name: Pauline Orta  MRN: [id number]  Today's Date: 2/25/2025  Time Calculation  Start Time: 1448  Stop Time: 1525  Time Calculation (min): 37 min  PT Therapeutic Procedures Time Entry  Manual Therapy Time Entry: 30    Insurance:  Visit number: 2 of 8  Authorization info: 7 visits 2/25-4/25 CPTs [id number]   Insurance Type: Payor: ANTH MEDICARE / Plan: [facility] DUAL ADVANTAGE / Product Type: *No Product type* /     Current Problem   1. Cervical radiculopathy  Follow Up In Physical Therapy      2. Postlaminectomy syndrome, cervical region  Follow Up In Physical Therapy      3. Cervical spondylosis without myelopathy  Follow Up In Physical Therapy      4. DDD (degenerative disc disease), cervical  Follow Up In Physical Therapy          Subjective   General   Reason for Referral: neck pain  General Comment: Pt reprots R side neck spasms without reduction throughout the day.  Precautions:  Precautions  Medical Precautions: Fall precautions  Pain    4  Post Treatment Pain Level 3    Objective   Tension along R upper cervical paraspinals, upper trap, levator     Treatments:    Manual:   STM along R cervical paraspinals, upper trap, levator  Gentle axial distraction       Assessment   Assessment:    Focus on manual therapy this date to reduce muscle tension and relieve pain patterns, will contniue as tolerated.     Plan:    Stability and manual     OP EDUCATION:   Use of heat     Goals:   Active       PT Problem       PT Goals       Start:  02/11/25    Expected End:  05/12/25       STG's:    1.  Pt will be Independent with HEP in order to return to prior level of function and manage current condition  2.  Pt to report decreased pain in R neck/shoulder from 5 </= 1/10 in order to participate in household activities, daily activities, and perform grooming/hygiene  3.  Pt will improve AROM of R shoulder, cervical R lateral flex and L Rotation in order to complete  activities of daily living    LTG'S:  1.  Pt will increase strength of R UE from 3- to 4/5 in order to complete household activities, daily activities, and perform grooming/hygiene  2.  Pt will improve score on NDI from 23/50 to 18/50 to demonstrate improved ability to manage daily living.          Patient Stated Goal 1       Start:  02/11/25    Expected End:  05/12/25       Move pain free

## 2025-02-26 LAB
ALBUMIN SERPL-MCNC: 4.4 G/DL (ref 3.6–5.1)
ALP SERPL-CCNC: 67 U/L (ref 37–153)
ALT SERPL-CCNC: 17 U/L (ref 6–29)
ANION GAP SERPL CALCULATED.4IONS-SCNC: 7 MMOL/L (CALC) (ref 7–17)
AST SERPL-CCNC: 20 U/L (ref 10–35)
BASOPHILS # BLD AUTO: 50 CELLS/UL (ref 0–200)
BASOPHILS NFR BLD AUTO: 0.9 %
BILIRUB SERPL-MCNC: 0.3 MG/DL (ref 0.2–1.2)
BUN SERPL-MCNC: 20 MG/DL (ref 7–25)
CALCIUM SERPL-MCNC: 9.3 MG/DL (ref 8.6–10.4)
CHLORIDE SERPL-SCNC: 107 MMOL/L (ref 98–110)
CO2 SERPL-SCNC: 28 MMOL/L (ref 20–32)
CREAT SERPL-MCNC: 0.91 MG/DL (ref 0.6–1)
CRP SERPL-MCNC: <3 MG/L
EGFRCR SERPLBLD CKD-EPI 2021: 65 ML/MIN/1.73M2
EOSINOPHIL # BLD AUTO: 112 CELLS/UL (ref 15–500)
EOSINOPHIL NFR BLD AUTO: 2 %
ERYTHROCYTE [DISTWIDTH] IN BLOOD BY AUTOMATED COUNT: 13.2 % (ref 11–15)
ERYTHROCYTE [DISTWIDTH] IN BLOOD BY AUTOMATED COUNT: 13.3 % (ref 11–15)
ERYTHROCYTE [SEDIMENTATION RATE] IN BLOOD BY WESTERGREN METHOD: 9 MM/H
EST. AVERAGE GLUCOSE BLD GHB EST-MCNC: 117 MG/DL
EST. AVERAGE GLUCOSE BLD GHB EST-SCNC: 6.5 MMOL/L
GLUCOSE SERPL-MCNC: 84 MG/DL (ref 65–99)
HBA1C MFR BLD: 5.7 % OF TOTAL HGB
HCT VFR BLD AUTO: 36.9 % (ref 35–45)
HCT VFR BLD AUTO: 37.2 % (ref 35–45)
HGB BLD-MCNC: 12.3 G/DL (ref 11.7–15.5)
HGB BLD-MCNC: 12.3 G/DL (ref 11.7–15.5)
LYMPHOCYTES # BLD AUTO: 2559 CELLS/UL (ref 850–3900)
LYMPHOCYTES NFR BLD AUTO: 45.7 %
MCH RBC QN AUTO: 30.6 PG (ref 27–33)
MCH RBC QN AUTO: 31.2 PG (ref 27–33)
MCHC RBC AUTO-ENTMCNC: 33.1 G/DL (ref 32–36)
MCHC RBC AUTO-ENTMCNC: 33.3 G/DL (ref 32–36)
MCV RBC AUTO: 92.5 FL (ref 80–100)
MCV RBC AUTO: 93.7 FL (ref 80–100)
MONOCYTES # BLD AUTO: 420 CELLS/UL (ref 200–950)
MONOCYTES NFR BLD AUTO: 7.5 %
NEUTROPHILS # BLD AUTO: 2458 CELLS/UL (ref 1500–7800)
NEUTROPHILS NFR BLD AUTO: 43.9 %
PLATELET # BLD AUTO: 271 THOUSAND/UL (ref 140–400)
PLATELET # BLD AUTO: 274 THOUSAND/UL (ref 140–400)
PMV BLD REES-ECKER: 10 FL (ref 7.5–12.5)
PMV BLD REES-ECKER: 9.9 FL (ref 7.5–12.5)
POTASSIUM SERPL-SCNC: 4.2 MMOL/L (ref 3.5–5.3)
PROT SERPL-MCNC: 6.8 G/DL (ref 6.1–8.1)
RBC # BLD AUTO: 3.94 MILLION/UL (ref 3.8–5.1)
RBC # BLD AUTO: 4.02 MILLION/UL (ref 3.8–5.1)
SODIUM SERPL-SCNC: 142 MMOL/L (ref 135–146)
TSH SERPL-ACNC: 1.81 MIU/L (ref 0.4–4.5)
WBC # BLD AUTO: 5.6 THOUSAND/UL (ref 3.8–10.8)
WBC # BLD AUTO: 5.7 THOUSAND/UL (ref 3.8–10.8)

## 2025-02-28 ENCOUNTER — DOCUMENTATION (OUTPATIENT)
Dept: PHYSICAL THERAPY | Facility: CLINIC | Age: 75
End: 2025-02-28
Payer: MEDICARE

## 2025-02-28 ENCOUNTER — APPOINTMENT (OUTPATIENT)
Dept: PHYSICAL THERAPY | Facility: CLINIC | Age: 75
End: 2025-02-28
Payer: MEDICARE

## 2025-02-28 DIAGNOSIS — M54.12 CERVICAL RADICULOPATHY: ICD-10-CM

## 2025-02-28 NOTE — PROGRESS NOTES
"Physical Therapy                 Therapy Communication Note    Patient Name: Pauline Orta \"Rebecca\"  MRN: [id number]  Department:   Room: Room/bed info not found  Today's Date: 2/28/2025     Discipline: Physical Therapy          Missed Visit Reason:      Missed Time: No Show    Comment:Called pt who forgot about appointment, confirmed next appointment 3/4 @ 3pm  "

## 2025-03-03 ENCOUNTER — APPOINTMENT (OUTPATIENT)
Dept: RADIOLOGY | Facility: CLINIC | Age: 75
End: 2025-03-03
Payer: MEDICARE

## 2025-03-03 ENCOUNTER — HOSPITAL ENCOUNTER (OUTPATIENT)
Dept: RADIOLOGY | Facility: CLINIC | Age: 75
Discharge: HOME | End: 2025-03-03
Payer: MEDICARE

## 2025-03-03 DIAGNOSIS — Z01.818 PRE-OP TESTING: ICD-10-CM

## 2025-03-03 PROCEDURE — 73200 CT UPPER EXTREMITY W/O DYE: CPT | Mod: RT

## 2025-03-04 ENCOUNTER — TREATMENT (OUTPATIENT)
Dept: PHYSICAL THERAPY | Facility: CLINIC | Age: 75
End: 2025-03-04
Payer: MEDICARE

## 2025-03-04 DIAGNOSIS — M50.30 DDD (DEGENERATIVE DISC DISEASE), CERVICAL: ICD-10-CM

## 2025-03-04 DIAGNOSIS — M54.12 CERVICAL RADICULOPATHY: ICD-10-CM

## 2025-03-04 DIAGNOSIS — M47.812 CERVICAL SPONDYLOSIS WITHOUT MYELOPATHY: ICD-10-CM

## 2025-03-04 DIAGNOSIS — M96.1 POSTLAMINECTOMY SYNDROME, CERVICAL REGION: ICD-10-CM

## 2025-03-04 PROBLEM — F10.21 ALCOHOL USE DISORDER, SEVERE, IN SUSTAINED REMISSION (MULTI): Status: RESOLVED | Noted: 2024-10-31 | Resolved: 2025-03-04

## 2025-03-04 PROCEDURE — 97110 THERAPEUTIC EXERCISES: CPT | Mod: GP | Performed by: PHYSICAL THERAPIST

## 2025-03-04 NOTE — PROGRESS NOTES
Physical Therapy Treatment    Patient Name: Pauline Orta  MRN: [id number]  Today's Date: 3/4/2025  Time Calculation  Start Time: 1452  Stop Time: 1522  Time Calculation (min): 30 min  PT Therapeutic Procedures Time Entry  Therapeutic Exercise Time Entry: 30    Insurance:  Visit number: 3 of 8  Authorization info: 7 visits 2/25-4/25 CPTs [id number]   Insurance Type: Payor: ANTH MEDICARE / [facility] DUAL ADVANTAGE / Product Type: *No Product type* /     Current Problem   1. Cervical radiculopathy  Follow Up In Physical Therapy      2. Postlaminectomy syndrome, cervical region  Follow Up In Physical Therapy      3. Cervical spondylosis without myelopathy  Follow Up In Physical Therapy      4. DDD (degenerative disc disease), cervical  Follow Up In Physical Therapy          Subjective   General   Reason for Referral: neck pain  Referred By: Yash Grace MD  Precautions:  Precautions  Medical Precautions: Fall precautions  Pain    0  Post Treatment Pain Level 0    Objective   Increased anterior shoulder position     Treatments:  Therapeutic Exercise:  Therapeutic Exercise  Therapeutic Exercise Performed: Yes  Therapeutic Exercise Activity 1: seated posterior shoulder rolls 10x3  Therapeutic Exercise Activity 2: seated chin tuck 10x3  Therapeutic Exercise Activity 3: seated scapular retraction  Therapeutic Exercise Activity 4: seated cervical rotation      Assessment   Assessment:    Pt tolerated session fairly well required redirecting throughout, challenged with exercise due to discomfort.     Plan:    Continue stability     OP EDUCATION:   Continue with established HEP     Goals:   Active       PT Problem       PT Goals       Start:  02/11/25    Expected End:  05/12/25       STG's:    1.  Pt will be Independent with HEP in order to return to prior level of function and manage current condition  2.  Pt to report decreased pain in R neck/shoulder from 5 </= 1/10 in order to participate in  household activities, daily activities, and perform grooming/hygiene  3.  Pt will improve AROM of R shoulder, cervical R lateral flex and L Rotation in order to complete activities of daily living    LTG'S:  1.  Pt will increase strength of R UE from 3- to 4/5 in order to complete household activities, daily activities, and perform grooming/hygiene  2.  Pt will improve score on NDI from 23/50 to 18/50 to demonstrate improved ability to manage daily living.          Patient Stated Goal 1       Start:  02/11/25    Expected End:  05/12/25       Move pain free

## 2025-03-06 ENCOUNTER — DOCUMENTATION (OUTPATIENT)
Dept: ORTHOPEDIC SURGERY | Facility: HOSPITAL | Age: 75
End: 2025-03-06

## 2025-03-06 ENCOUNTER — TREATMENT (OUTPATIENT)
Dept: PHYSICAL THERAPY | Facility: CLINIC | Age: 75
End: 2025-03-06
Payer: MEDICARE

## 2025-03-06 DIAGNOSIS — M96.1 POSTLAMINECTOMY SYNDROME, CERVICAL REGION: ICD-10-CM

## 2025-03-06 DIAGNOSIS — M47.812 CERVICAL SPONDYLOSIS WITHOUT MYELOPATHY: ICD-10-CM

## 2025-03-06 DIAGNOSIS — M50.30 DDD (DEGENERATIVE DISC DISEASE), CERVICAL: ICD-10-CM

## 2025-03-06 DIAGNOSIS — M54.12 CERVICAL RADICULOPATHY: ICD-10-CM

## 2025-03-06 PROCEDURE — 97110 THERAPEUTIC EXERCISES: CPT | Mod: GP | Performed by: PHYSICAL THERAPIST

## 2025-03-06 ASSESSMENT — PAIN SCALES - GENERAL: PAINLEVEL_OUTOF10: 6

## 2025-03-06 ASSESSMENT — PAIN - FUNCTIONAL ASSESSMENT: PAIN_FUNCTIONAL_ASSESSMENT: 0-10

## 2025-03-06 NOTE — PROGRESS NOTES
Physical Therapy Treatment    Patient Name: Pauline Orta  MRN: [id number]  Today's Date: 3/6/2025  Time Calculation  Start Time: 1100    Insurance:  Visit number: 4 of 7  Authorization info: PT: 7 visits 2/25-4/25 CPTs [id number]  OT: 8 VISITS FOR [id number], [id number], [id number], 06960, [id number], [id number] FROM 1/7/25 TO 3/7/25    2025: 1) EVAL ONLY - [facility] - AUTH THRU [employer] / $9350 OOP not met / 80% COVERAGE / [place] VISITS / ds 2/10/25. 2) [employer] MY CARE - NO AUTH / 100% coverage / per [facility] w/s.   Insurance Type: Payor: [employer]ICARE / Plan: [employer] DUAL ADVANT[employer] / Product Type: *No Product type* /     Current Problem   1. Cervical radiculopathy  Follow Up In Physical Therapy      2. Postlaminectomy syndrome, cervical region  Follow Up In Physical Therapy      3. Cervical spondylosis without myelopathy  Follow Up In Physical Therapy      4. DDD (degenerative disc disease), cervical  Follow Up In Physical Therapy          Subjective   General   Reason for Referral: neck pain  Referred By: Yash Grace MD  General Comment: Pt states that she will be having shoulder surgery R shoulder.  Pt did have cervical injection with no relief.  Precautions:  Precautions  Medical Precautions: Fall precautions  Pain   Pain Assessment: 0-10  0-10 (Numeric) Pain Score: 6  Pain Type: Chronic pain  Pain Location:  (neck and R shoulder)  Pain Orientation: Right  Pain Frequency: Constant/continuous  Clinical Progression: Not changed  Post Treatment Pain Level Pt rated pain 4/10     Objective   Pt with guarding R UE and avoiding R neck rotation    Treatments:  Therapeutic Exercise:  Therapeutic Exercise  Therapeutic Exercise Performed: Yes  Therapeutic Exercise Activity 1: MHP to neck concurrent with digiflex 1-3# x15 each R hand  Therapeutic Exercise Activity 2: 1# wrist flex, RD/UD, ext, pronation/supination  Therapeutic Exercise Activity 3: biceps curl OTB 2 x10 x10  Therapeutic Exercise Activity 4: spinal decompression position  with R UE on piollow  Therapeutic Exercise Activity 5: supine chin nods x10  Therapeutic Exercise Activity 6: supine head press x8 3 ct hold cues for only 1# of pressure vs 5#  Therapeutic Exercise Activity 7: supine shoulder press x10 3 ct hold  Therapeutic Exercise Activity 8: tricep ext supine OTB 2 x10  Therapeutic Exercise Activity 9: scap retraction seated with OTB 2 x10      Assessment   Assessment:   PT Assessment  Assessment Comment: Pt with better tolerance for today's exercises.  Pt stated that she would be having R shoulder surgery and that she needs to f/u with a spine doctor for her neck.  Pt with reduction in neck pain post session.  Added scap retraction with OTB todya for HEP    Plan:   OP PT Plan  Onset Date: 01/23/25  Certification Period Start Date: 02/11/25  Certification Period End Date: 05/12/25  Number of Treatments Authorized: 7    OP EDUCATION:  Outpatient Education  Individual(s) Educated: Patient  Education Provided: Home Exercise Program  Plan of Care Discussed and Agreed Upon: yes  Patient Response to Education: Patient/Caregiver Verbalized Understanding of Information, Patient/Caregiver Performed Return Demonstration of Exercises/Activities    Goals:   Active       PT Problem       PT Goals       Start:  02/11/25    Expected End:  05/12/25       STG's:    1.  Pt will be Independent with HEP in order to return to prior level of function and manage current condition  2.  Pt to report decreased pain in R neck/shoulder from 5 </= 1/10 in order to participate in household activities, daily activities, and perform grooming/hygiene  3.  Pt will improve AROM of R shoulder, cervical R lateral flex and L Rotation in order to complete activities of daily living    LTG'S:  1.  Pt will increase strength of R UE from 3- to 4/5 in order to complete household activities, daily activities, and perform grooming/hygiene  2.  Pt will improve score on NDI from 23/50 to 18/50 to demonstrate improved ability  to manage daily living.          Patient Stated Goal 1       Start:  02/11/25    Expected End:  05/12/25       Move pain free

## 2025-03-11 ENCOUNTER — TREATMENT (OUTPATIENT)
Dept: PHYSICAL THERAPY | Facility: CLINIC | Age: 75
End: 2025-03-11
Payer: MEDICARE

## 2025-03-11 ENCOUNTER — APPOINTMENT (OUTPATIENT)
Dept: PHYSICAL THERAPY | Facility: CLINIC | Age: 75
End: 2025-03-11
Payer: MEDICARE

## 2025-03-11 DIAGNOSIS — M47.812 CERVICAL SPONDYLOSIS WITHOUT MYELOPATHY: ICD-10-CM

## 2025-03-11 DIAGNOSIS — M50.30 DDD (DEGENERATIVE DISC DISEASE), CERVICAL: ICD-10-CM

## 2025-03-11 DIAGNOSIS — M54.12 CERVICAL RADICULOPATHY: ICD-10-CM

## 2025-03-11 DIAGNOSIS — M96.1 POSTLAMINECTOMY SYNDROME, CERVICAL REGION: ICD-10-CM

## 2025-03-11 PROCEDURE — 97530 THERAPEUTIC ACTIVITIES: CPT | Mod: GP | Performed by: PHYSICAL THERAPIST

## 2025-03-11 ASSESSMENT — PAIN - FUNCTIONAL ASSESSMENT: PAIN_FUNCTIONAL_ASSESSMENT: 0-10

## 2025-03-11 ASSESSMENT — PAIN SCALES - GENERAL: PAINLEVEL_OUTOF10: 8

## 2025-03-11 NOTE — PROGRESS NOTES
Physical Therapy Treatment    Patient Name: Pauline Orta  MRN: [id number]  Today's Date: 3/11/2025  Time Calculation  Start Time: 1419  Stop Time: 1454  Time Calculation (min): 35 min  PT Therapeutic Procedures Time Entry  Therapeutic Activity Time Entry: 35    Insurance:  Visit number: 5 of 7  Authorization info: PT: 7 visits 2/25-4/25 CPTs [id number]   Insurance Type: Payor: ANTH MEDICARE / Plan: ANTH DUAL ADVANTAGE / Product Type: *No Product type* /     Current Problem   1. Cervical radiculopathy  Follow Up In Physical Therapy      2. Postlaminectomy syndrome, cervical region  Follow Up In Physical Therapy      3. Cervical spondylosis without myelopathy  Follow Up In Physical Therapy      4. DDD (degenerative disc disease), cervical  Follow Up In Physical Therapy          Subjective   General   Reason for Referral: neck pain  Referred By: Yash Grace MD  General Comment: Pt reports she has been very busy this date and reports that her shoulder has been extremely uncomfortable which is further incresaing her neck pain as she is leaning toward the R side.  Precautions:  Precautions  Medical Precautions: Fall precautions  Pain   Pain Assessment: 0-10  0-10 (Numeric) Pain Score: 8  Pain Type: Chronic pain  Pain Location: Other (Comment) (Neck and R shoulder)  Pain Orientation: Right  Post Treatment Pain Level 6    Objective   Limited due to high pain levels     Treatments:    Therapeutic exercise:  Seated scapular retraction 20x  Seated cervical rotation 10x2   Seated cervical flexion 10x2    Therapeutic activity:   Educated on TENS/IFC for pain control to help with symptoms until planned surgery. Education provided during IFC and hot pack application. Education on method of TENS in relation to pain pathways.     Modalities  Unattended e-stim: IFC: applied to R upper trap and GH , Intensity to tolerance, for 12 minutes, in Seated   Hot Pack applied during IFC     Assessment   Assessment:     Pt entered therapy with high pain levels this date discussed pain control interventions, decided to trial IFC for pain control with use of heat. Pt does have homes TENS unit but is unsure how to utilize, instructed to bring TENS unit with her to next session for education and use. Noted improvement with cervical ROM and pain reduction post TENS unit.     Plan:    Return to gentle ROM and strengthening next session ; Educated about home TENS unit     OP EDUCATION:   See above     Goals:   Active       PT Problem       PT Goals       Start:  02/11/25    Expected End:  05/12/25       STG's:    1.  Pt will be Independent with HEP in order to return to prior level of function and manage current condition  2.  Pt to report decreased pain in R neck/shoulder from 5 </= 1/10 in order to participate in household activities, daily activities, and perform grooming/hygiene  3.  Pt will improve AROM of R shoulder, cervical R lateral flex and L Rotation in order to complete activities of daily living    LTG'S:  1.  Pt will increase strength of R UE from 3- to 4/5 in order to complete household activities, daily activities, and perform grooming/hygiene  2.  Pt will improve score on NDI from 23/50 to 18/50 to demonstrate improved ability to manage daily living.          Patient Stated Goal 1       Start:  02/11/25    Expected End:  05/12/25       Move pain free

## 2025-03-17 ENCOUNTER — TREATMENT (OUTPATIENT)
Dept: PHYSICAL THERAPY | Facility: CLINIC | Age: 75
End: 2025-03-17
Payer: MEDICARE

## 2025-03-17 DIAGNOSIS — M54.12 CERVICAL RADICULOPATHY: ICD-10-CM

## 2025-03-17 DIAGNOSIS — M47.812 CERVICAL SPONDYLOSIS WITHOUT MYELOPATHY: ICD-10-CM

## 2025-03-17 DIAGNOSIS — M96.1 POSTLAMINECTOMY SYNDROME, CERVICAL REGION: ICD-10-CM

## 2025-03-17 DIAGNOSIS — M50.30 DDD (DEGENERATIVE DISC DISEASE), CERVICAL: ICD-10-CM

## 2025-03-17 PROCEDURE — 97530 THERAPEUTIC ACTIVITIES: CPT | Mod: GP | Performed by: PHYSICAL THERAPIST

## 2025-03-17 NOTE — PROGRESS NOTES
"Physical Therapy Treatment    Patient Name: Pauline Orta  MRN: [id number]  Today's Date: 3/17/2025  Time Calculation  Start Time: 1400  Stop Time: 1440  Time Calculation (min): 40 min  PT Therapeutic Procedures Time Entry  Therapeutic Activity Time Entry: 40    Insurance:  Visit number: 6 of 7  Authorization info:  7 visits 2/25-4/25 CPTs [id number]   Insurance Type: Payor: ANTH MEDICARE / Plan: [facility] DUAL ADVANTAGE / Product Type: *No Product type* /     Current Problem   1. Postlaminectomy syndrome, cervical region  Follow Up In Physical Therapy      2. Cervical spondylosis without myelopathy  Follow Up In Physical Therapy      3. Cervical radiculopathy  Follow Up In Physical Therapy      4. DDD (degenerative disc disease), cervical  Follow Up In Physical Therapy          Subjective   General   Reason for Referral: neck pain  Referred By: Yash Grace MD  General Comment: Pt feels \"beat up\" today. She does not report any change with her cervical pain other than a reduction in the grabbing sensation.  Precautions:  Precautions  Medical Precautions: Fall precautions  Pain    mod  Post Treatment Pain Level mod    Objective   Guarded cervical motion this date     Treatments:    Therapeutic activity:   Education and set up of home TENS unit for pain relief at home  Review of settings, pad placement, frequency etc.,       Assessment   Assessment:    Pt brought in home TENS unit for assistance with set up, education and training on home TENS unit this date. Will perform reassessment next session.     Plan:    Re-assess    OP EDUCATION:   See above    Goals:   Active       PT Problem       PT Goals       Start:  02/11/25    Expected End:  05/12/25       STG's:    1.  Pt will be Independent with HEP in order to return to prior level of function and manage current condition  2.  Pt to report decreased pain in R neck/shoulder from 5 </= 1/10 in order to participate in household activities, daily " activities, and perform grooming/hygiene  3.  Pt will improve AROM of R shoulder, cervical R lateral flex and L Rotation in order to complete activities of daily living    LTG'S:  1.  Pt will increase strength of R UE from 3- to 4/5 in order to complete household activities, daily activities, and perform grooming/hygiene  2.  Pt will improve score on NDI from 23/50 to 18/50 to demonstrate improved ability to manage daily living.          Patient Stated Goal 1       Start:  02/11/25    Expected End:  05/12/25       Move pain free

## 2025-03-21 ENCOUNTER — APPOINTMENT (OUTPATIENT)
Dept: ORTHOPEDIC SURGERY | Facility: CLINIC | Age: 75
End: 2025-03-21
Payer: MEDICARE

## 2025-03-24 ENCOUNTER — APPOINTMENT (OUTPATIENT)
Dept: ORTHOPEDIC SURGERY | Facility: CLINIC | Age: 75
End: 2025-03-24
Payer: MEDICARE

## 2025-03-25 ENCOUNTER — APPOINTMENT (OUTPATIENT)
Dept: PHYSICAL THERAPY | Facility: CLINIC | Age: 75
End: 2025-03-25
Payer: MEDICARE

## 2025-03-25 ENCOUNTER — DOCUMENTATION (OUTPATIENT)
Dept: PHYSICAL THERAPY | Facility: CLINIC | Age: 75
End: 2025-03-25
Payer: MEDICARE

## 2025-03-25 DIAGNOSIS — M54.12 CERVICAL RADICULOPATHY: ICD-10-CM

## 2025-03-25 NOTE — PROGRESS NOTES
"Physical Therapy                 Therapy Communication Note    Patient Name: Pauline Orta \"Rebecca\"  MRN: [id number]  Department:   Room: Room/bed info not found  Today's Date: 3/25/2025     Discipline: Physical Therapy      Missed Time: No Show    Comment: PT called pt who apologized there has been a family emergency and she is unable to attend this session.   "

## 2025-04-01 ENCOUNTER — APPOINTMENT (OUTPATIENT)
Dept: PHYSICAL THERAPY | Facility: CLINIC | Age: 75
End: 2025-04-01
Payer: MEDICARE

## 2025-04-01 ENCOUNTER — DOCUMENTATION (OUTPATIENT)
Dept: PHYSICAL THERAPY | Facility: CLINIC | Age: 75
End: 2025-04-01
Payer: MEDICARE

## 2025-04-01 DIAGNOSIS — M54.12 CERVICAL RADICULOPATHY: ICD-10-CM

## 2025-04-01 NOTE — PROGRESS NOTES
"Physical Therapy                 Therapy Communication Note    Patient Name: Pauline Orta \"Rebecca\"  MRN: [id number]  Department:   Room: Room/bed info not found  Today's Date: 4/1/2025     Discipline: Physical Therapy          Missed Visit Reason:  Family issue    Missed Time: Cx    Comment:  "

## 2025-04-15 ENCOUNTER — TELEPHONE (OUTPATIENT)
Dept: PRIMARY CARE | Facility: CLINIC | Age: 75
End: 2025-04-15
Payer: MEDICARE

## 2025-04-15 DIAGNOSIS — G43.909 MIGRAINE WITHOUT STATUS MIGRAINOSUS, NOT INTRACTABLE, UNSPECIFIED MIGRAINE TYPE: Primary | ICD-10-CM

## 2025-04-15 DIAGNOSIS — F41.9 ANXIETY: ICD-10-CM

## 2025-04-15 RX ORDER — BUSPIRONE HYDROCHLORIDE 5 MG/1
5 TABLET ORAL 2 TIMES DAILY PRN
Qty: 60 TABLET | Refills: 0 | Status: SHIPPED | OUTPATIENT
Start: 2025-04-15 | End: 2026-04-15

## 2025-04-15 NOTE — TELEPHONE ENCOUNTER
LM notifying pt   Name: Jammie Welch      :       MRN: [id number]  Encounter Provider: Shyam Galvan DO  Encounter Date: 2023   Encounter department: [address]     1. Encounter to discuss test results    2. Bilateral pulmonary embolism (HCC)  Comments:  onset 4 yrs ago--on Xarelto 20 mg for life -- and recurred in 2019 bilat lungs     3. Opioid dependence, uncomplicated ([address])  Comments: Only taking one Tramadol at 5 pm when pain in the R knee    4. Depression, recurrent ([address])  Comments:  controlled, but says not normal -- getting by, family issues. 5. Bilateral malignant neoplasm of breast in female, unspecified estrogen receptor status, unspecified site of breast ([address])    6. Medication management  Comments: All med eval and disucessed. 7. H/O bilateral breast implants  Comments:   replaced after fell on chest           Subjective      HPI -- 76 y o female, in NAD, here for ongoing med eval of all the issues in the Visit   Dx section and is well-known to me for many years presents for f/u and evaluation of the following medical issues:       Review of Systems   HENT: Negative for trouble swallowing. Eyes: Negative. Respiratory: Negative for shortness of breath. Not much pulmonary reserve and probably would benefit from pulmonary rehab? Cardiovascular:        Denies today chest pain tightness heaviness pressure but does have vague aches and pains and is considerably deconditioned. Gastrointestinal: Negative for nausea. Has what appears to be esophageal dysmotility syndrome or something that is affecting the esophagus given GERD and regurgitation and feeling of food impaction. Just completed the Ba swallow and noteable esoph dismotility   Genitourinary: Negative. Musculoskeletal: Positive for arthralgias, back pain, joint swelling and myalgias.         Stopped Cymbalta - not helping   Not herself    Skin: Positive for pallor. Neurological: Negative for dizziness and weakness. Hematological: Negative. Psychiatric/Behavioral: Positive for sleep disturbance (States she sleeps 2 hours then up and sleep is broken). Negative for dysphoric mood. Current Outpatient Medications on File Prior to Visit   Medication Sig   • acetaminophen (TYLENOL) 325 mg tablet Take 650 mg by mouth every 6 (six) hours as needed for mild pain   • amLODIPine (NORVASC) 5 mg tablet Take 1 tablet (5 mg total) by mouth daily   • ASPIRIN 81 PO Aspirin EC 81 MG Oral Tablet Delayed Release    Refills: 0       Active   • brimonidine (ALPHAGAN P) 0.15 % ophthalmic solution Administer to the right eye every evening    • cholecalciferol (VITAMIN D3) 1,000 units tablet Take 1,000 Units by mouth daily   • cyclobenzaprine (FLEXERIL) 5 mg tablet Take 1 tablet (5 mg total) by mouth 3 (three) times a day as needed for muscle spasms   • desvenlafaxine succinate (PRISTIQ) 50 mg 24 hr tablet Take 1 tablet (50 mg total) by mouth daily   • famotidine (PEPCID) 40 MG tablet TAKE 1 TABLET TWICE DAILY   • ferrous sulfate 325 (65 Fe) mg tablet 325 mg by oral route.    • fexofenadine (ALLEGRA) 180 MG tablet Take 180 mg by mouth as needed    • fluticasone (FLONASE) 50 mcg/act nasal spray 2 sprays into each nostril once OD   • hydrOXYzine HCL (ATARAX) 25 mg tablet Take 25 mg by mouth daily at bedtime as needed for anxiety or itching   • latanoprost (XALATAN) 0.005 % ophthalmic solution Administer 1 drop to both eyes daily at bedtime   • levothyroxine 100 mcg tablet TAKE 1 TABLET EVERY DAY   • pregabalin (LYRICA) 150 mg capsule Take 1 capsule (150 mg total) by mouth in the morning   • rivaroxaban (Xarelto) 20 mg tablet Take 1 tablet (20 mg total) by mouth every evening   • rOPINIRole (REQUIP) 1 mg tablet TAKE 1 TABLET AT 6PM AND TAKE 2 TABLETS AT BEDTIME   • senna (SENOKOT) 8.6 MG tablet Take 8.6 mg by mouth in the morning   • torsemide (DEMADEX) 10 mg tablet TAKE 1 TABLET (10 MG TOTAL) BY MOUTH DAILY   • traMADol (Ultram) 50 mg tablet Take 1/2 tab at 8AM, 1/2 tab at 1PM, and 1 tab at HS (total 2 tabs/day) (Patient taking differently: Pt taking 1-2 tablets daily)   • alendronate (Fosamax) 70 mg tablet Take 1 tablet every week by oral route. (Patient not taking: Reported on 7/18/2023)   • calcium citrate-vitamin D 315 mg-5 mcg tablet  (Patient not taking: Reported on 7/18/2023)   • Chelated Zinc 50 MG TABS 50 mg by oral route. (Patient not taking: Reported on 7/18/2023)   • DULoxetine (CYMBALTA) 30 mg delayed release capsule total of 90mg (Patient not taking: Reported on 3/44/6266)   • folic acid (FOLVITE) 1 mg tablet 1 mg by oral route. (Patient not taking: Reported on 7/18/2023)   • ipratropium (ATROVENT) 0.03 % nasal spray USE 2 SPRAYS IN EACH NOSTRIL TWICE DAILY AS NEEDED FOR RHINITIS OR RUNNY NOSE (Patient not taking: Reported on 4/11/2023)   • ondansetron (ZOFRAN) 4 mg tablet Take 1 tablet (4 mg total) by mouth every 8 (eight) hours as needed for nausea or vomiting (Patient not taking: Reported on 7/18/2023)   • simvastatin (ZOCOR) 40 mg tablet Take 1 tablet every day by oral route. (Patient not taking: Reported on 7/18/2023)   • timolol (TIMOPTIC) 0.5 % ophthalmic solution Administer to both eyes 2 (two) times a day    • [DISCONTINUED] raloxifene (Evista) 60 mg tablet Take 1 tablet every day by oral route. (Patient not taking: Reported on 7/18/2023)       Objective     /80 (BP Location: Left arm, Patient Position: Sitting, Cuff Size: Standard)   Pulse 82   Temp 98.6 °F (37 °C) (Temporal)   Ht 5' 4" (1.626 m)   Wt 83 kg (183 lb)   BMI 31.41 kg/m²     Physical Exam  Vitals and nursing note reviewed. Constitutional:       General: She is not in acute distress. Appearance: Normal appearance. She is well-developed. She is obese. She is not ill-appearing, toxic-appearing or diaphoretic. HENT:      Head: Normocephalic and atraumatic.       Nose: Nose normal.   Eyes: Conjunctiva/sclera: Conjunctivae normal.      Pupils: Pupils are equal, round, and reactive to light. Neck:      Thyroid: No thyromegaly. Trachea: No tracheal deviation. Cardiovascular:      Rate and Rhythm: Normal rate and regular rhythm. Pulses: Normal pulses. Heart sounds: Normal heart sounds. Pulmonary:      Effort: No respiratory distress. Breath sounds: Normal breath sounds. No wheezing, rhonchi or rales. Chest:      Chest wall: No tenderness. Abdominal:      General: Bowel sounds are normal. There is no distension. Palpations: Abdomen is soft. There is no mass. Tenderness: There is no abdominal tenderness. There is no guarding or rebound. Musculoskeletal:         General: No tenderness or deformity. Normal range of motion. Cervical back: Normal range of motion and neck supple. Right lower leg: No edema. Left lower leg: No edema. Comments: [facility], 2021 surg LLE tendon. Doing well, can now flex foot     Skin:     General: Skin is warm and dry. Coloration: Skin is not jaundiced or pale. Findings: No bruising, erythema or rash. Neurological:      General: No focal deficit present. Mental Status: She is alert and oriented to person, place, and time. Mental status is at baseline. Cranial Nerves: No cranial nerve deficit. Comments: Note--I have accessed the risk scores for the ASCVD risk at 24.5%   Psychiatric:         Mood and Affect: Mood normal.         Behavior: Behavior normal.         Thought Content: Thought content normal.         Judgment: Judgment normal.           Falls Plan of Care: balance, strength, and gait training instructions were provided. Recommended assistive device to help with gait and balance. Medications that increase falls were reviewed. Vitamin D supplementation was recommended. Home safety evaluation by OT recommended. Home safety education provided. Walks with cane and going to PT.    Will see Dr. Rogers Flow this afternoon, re knee. Urinary Incontinence Plan of Care: counseling topics discussed: practice Kegel (pelvic floor strengthening) exercises, use restroom every 2 hours, limit alcohol, caffeine, spicy foods, and acidic foods, keeping a bladder diary, limiting fluid intake 3-4 hours before bed, weight loss, preventing constipation, taking fluid pills at a time when you can get to bathroom easily, limiting fluid intake to 60 oz. per day, wearing CAMILLA stockings for edema control and bladder retraining. Is seeing back surgery - Dr. Zelalem Mcconnell in [place]. I personally reviewed the recent (and prior)  lab results, the image studies, pathology, other specialty/physicians consult notes and recommendations, and outside medical records from other institutions, as appropriate. I had a lengthy discussion with the patient and shared the work-up findings. We discussed the diagnosis and management plan. I spent  35  minutes reviewing the records (labs, clinician notes, outside records, medical history, ordering medicine/tests/procedures, interpreting the imaging/labs previously done) and coordination of care as well as direct time with the patient today, of which greater than 50% of the time was spent in counseling and coordination of care with the patient/family.       Nellie Mims, DO

## 2025-04-15 NOTE — TELEPHONE ENCOUNTER
PT CALLING IN STATING HER SON HAS FALLEN ILL REQUESTING SOMETHING STRONGER THAN WHAT SHE CURRENTLY HAS HYDROXAZINE 25 MG FOR ANXIETY

## 2025-04-21 ENCOUNTER — APPOINTMENT (OUTPATIENT)
Dept: ORTHOPEDIC SURGERY | Facility: CLINIC | Age: 75
End: 2025-04-21
Payer: MEDICARE

## 2025-05-01 ENCOUNTER — DOCUMENTATION (OUTPATIENT)
Dept: PHYSICAL THERAPY | Facility: CLINIC | Age: 75
End: 2025-05-01
Payer: MEDICARE

## 2025-05-01 DIAGNOSIS — M54.12 CERVICAL RADICULOPATHY: ICD-10-CM

## 2025-05-01 NOTE — PROGRESS NOTES
"Physical Therapy                 Therapy Communication Note    Patient Name: Pauline Orta \"Rebecca\"  MRN: [id number]  Department:   Room: Room/bed info not found  Today's Date: 5/1/2025     Discipline: Physical Therapy          Missed Visit Reason:      Missed Time: No Show    Comment: pt did not show for scheduled appointment  "

## 2025-05-02 ENCOUNTER — TELEMEDICINE (OUTPATIENT)
Dept: PRIMARY CARE | Facility: CLINIC | Age: 75
End: 2025-05-02
Payer: MEDICARE

## 2025-05-02 DIAGNOSIS — J00 COMMON COLD: Primary | ICD-10-CM

## 2025-05-02 PROCEDURE — G2211 COMPLEX E/M VISIT ADD ON: HCPCS | Performed by: NURSE PRACTITIONER

## 2025-05-02 PROCEDURE — 1123F ACP DISCUSS/DSCN MKR DOCD: CPT | Performed by: NURSE PRACTITIONER

## 2025-05-02 PROCEDURE — 99213 OFFICE O/P EST LOW 20 MIN: CPT | Performed by: NURSE PRACTITIONER

## 2025-05-02 PROCEDURE — 1159F MED LIST DOCD IN RCRD: CPT | Performed by: NURSE PRACTITIONER

## 2025-05-02 PROCEDURE — 1036F TOBACCO NON-USER: CPT | Performed by: NURSE PRACTITIONER

## 2025-05-02 PROCEDURE — 1125F AMNT PAIN NOTED PAIN PRSNT: CPT | Performed by: NURSE PRACTITIONER

## 2025-05-02 ASSESSMENT — PAIN SCALES - GENERAL: PAINLEVEL_OUTOF10: 6

## 2025-05-02 NOTE — PROGRESS NOTES
"With patient's permission this is a telemedicine visit with video and audio.  History Of Present Illness  Pauline Orta \"Rebecca\" is a 74 y.o. female who calls in for Sick Visit (HEADACHE, NOSE RUNNING, CHEST CONGESTION. SICK SINCE YESTERDAY. HAS NOT COVID TESTED ).    HPI Pt is a 74 year old female with cold symptoms. Headache, runny nose, no cough.  Started yesterday, had two daytime and two nighttime cold medications.      Past Medical History  She has a past medical history of Adverse effect of anesthesia, Anxiety, Arthritis, Cervical disc disease, Chronic pain disorder, Fibromyalgia, primary, GERD (gastroesophageal reflux disease), Joint pain, Low back pain, Lumbosacral disc disease, Migraine, Neck pain, Osteoarthritis, Peripheral neuropathy, and Spinal stenosis.    Medications  Current Outpatient Medications   Medication Instructions    ascorbic acid (VITAMIN C) 500 mg, Daily    busPIRone (BUSPAR) 5 mg, oral, 2 times daily PRN    calcium citrate-vitamin D2 250 mg-2.5 mcg (100 unit) tablet 1 tablet, Daily    cholecalciferol (VITAMIN D-3) 125 mcg, Daily    cyanocobalamin (VITAMIN B-12) 250 mcg, Daily    diclofenac sodium (VOLTAREN) 4 g, 4 times daily PRN    estradiol (ESTRACE) 1 g, vaginal, 2 times weekly, INSERT 1 GRAM VAGINALLY 2 TIMES A WEEK    gabapentin (NEURONTIN) 800 mg, oral, 3 times daily    hydrOXYzine HCL (ATARAX) 25 mg, oral, 3 times daily    multivitamin tablet 1 tablet, Daily    omega 3-dha-epa-fish oil (Fish OiL) 1,000 mg (120 mg-180 mg) capsule 1,000 mg, Daily    omeprazole (PRILOSEC) 20 mg, oral, 2 times daily before meals, Do not crush or chew.    polyethylene glycol (GLYCOLAX, MIRALAX) 17 g, oral, Daily    Restasis 0.05 % ophthalmic emulsion 1 drop, 2 times daily PRN    rimegepant (NURTEC ODT) 75 mg, oral, Every other day    tiZANidine (ZANAFLEX) 4 mg, oral, Every 8 hours PRN    topiramate (TOPAMAX) 25 mg, oral, Daily    TUMERIC-GING-OLIVE-OREG-CAPRYL ORAL 1 mg, Daily        Surgical " History  She has a past surgical history that includes MR angio head wo IV contrast (10/14/2019); Gastric bypass; Cervical fusion; Hysterectomy; Cataract extraction; Cervical fusion; Carpal tunnel release (Right); Trigger finger release (Right); Laminectomy; Neck surgery; orthopedic surgery; Spinal fusion; Joint replacement; and Total shoulder arthroplasty (Right, 2023).     Social History  She reports that she quit smoking about 35 years ago. Her smoking use included cigarettes. She started smoking about 45 years ago. She has a 2.5 pack-year smoking history. She has never used smokeless tobacco. She reports that she does not currently use alcohol. She reports that she does not use drugs.    Family History  Family History[1]     Allergies  Aspirin, Iodinated contrast media, Lithium, Shellfish derived, Adhesive, and Caffeine    On video:     Appearance: Normal appearance.      Effort: Respiratory effort is normal. Speaking in full sentences. No respiratory distress.     Mood and Affect: Mood normal.         Thought Content: Thought content normal.         Judgment: Judgment normal.      Last Recorded Vitals  There were no vitals taken for this visit.  (Vitals are taken by patient at home, if any reported)    Relevant Results      Assessment/Plan   There are no diagnoses linked to this encounter.        Counseling    Medication education:              Education:  The patient is counseled regarding potential side-effects of any and all new medications             Understanding:  Patient expressed understanding             Adherence:  No barriers to adherence identified      Mala Obregon, APRN-CNP        [1]   Family History  Problem Relation Name Age of Onset    Diabetes Mother Racquel Gonzalesyomaira     Arthritis Mother Racquel Hicksjohana     Osteoporosis Mother Racquel Castellanofede     Cancer Father Ahsan Bar     Lung disease Father Ahsan Bar     Heart disease Father Ahsan Bar     Alcohol abuse Father Ahsan  Bar     Alcohol abuse Brother Cr Dinero     Alcohol abuse Sister Mary Figueroayuly     Diabetes Son Billy You     Drug abuse Brother Cr

## 2025-05-17 DIAGNOSIS — G43.909 MIGRAINE WITHOUT STATUS MIGRAINOSUS, NOT INTRACTABLE, UNSPECIFIED MIGRAINE TYPE: ICD-10-CM

## 2025-05-21 DIAGNOSIS — F41.9 ANXIETY: ICD-10-CM

## 2025-05-21 RX ORDER — ONDANSETRON 4 MG/1
TABLET, ORALLY DISINTEGRATING ORAL
Qty: 20 TABLET | Refills: 0 | Status: SHIPPED | OUTPATIENT
Start: 2025-05-21

## 2025-05-21 RX ORDER — BUSPIRONE HYDROCHLORIDE 5 MG/1
TABLET ORAL
Qty: 60 TABLET | Refills: 0 | Status: SHIPPED | OUTPATIENT
Start: 2025-05-21

## 2025-05-21 NOTE — TELEPHONE ENCOUNTER
5/21/25  9:45 AM  Note      Left message to call back and schedule physical         Prescription request received and populated   Pharmacy populated  Last Office Visit: 2/18/25 for acute ov  4/03/24 Last Physical with Dr. Noel Alexandre        
Approving.  
Please advise on rf   
Yes

## 2025-05-21 NOTE — TELEPHONE ENCOUNTER
Prescription request received and populated   Pharmacy populated  Last Office Visit: 2/18/25 for acute ov  4/03/24 Last Physical with Dr. Noel Alexandre

## 2025-05-27 ENCOUNTER — OFFICE VISIT (OUTPATIENT)
Dept: URGENT CARE | Age: 75
End: 2025-05-27
Payer: MEDICARE

## 2025-05-27 ENCOUNTER — ANCILLARY PROCEDURE (OUTPATIENT)
Dept: URGENT CARE | Age: 75
End: 2025-05-27
Payer: MEDICARE

## 2025-05-27 VITALS
HEART RATE: 86 BPM | TEMPERATURE: 97.6 F | OXYGEN SATURATION: 93 % | HEIGHT: 63 IN | BODY MASS INDEX: 24.63 KG/M2 | SYSTOLIC BLOOD PRESSURE: 169 MMHG | RESPIRATION RATE: 18 BRPM | DIASTOLIC BLOOD PRESSURE: 84 MMHG | WEIGHT: 139 LBS

## 2025-05-27 DIAGNOSIS — R05.1 ACUTE COUGH: Primary | ICD-10-CM

## 2025-05-27 PROCEDURE — 71046 X-RAY EXAM CHEST 2 VIEWS: CPT | Performed by: EMERGENCY MEDICINE

## 2025-05-27 RX ORDER — AZITHROMYCIN 250 MG/1
TABLET, FILM COATED ORAL
Qty: 6 TABLET | Refills: 0 | Status: SHIPPED | OUTPATIENT
Start: 2025-05-27

## 2025-05-27 ASSESSMENT — ENCOUNTER SYMPTOMS
FATIGUE: 0
ACTIVITY CHANGE: 0
VOICE CHANGE: 0
SHORTNESS OF BREATH: 0
ABDOMINAL PAIN: 0
FEVER: 0
APPETITE CHANGE: 0
CHILLS: 0
COUGH: 1

## 2025-05-27 ASSESSMENT — PAIN SCALES - GENERAL: PAINLEVEL_OUTOF10: 0-NO PAIN

## 2025-05-27 NOTE — PROGRESS NOTES
"Subjective   Patient ID: Pauline Orta \"Megan" is a 74 y.o. female. They present today with a chief complaint of Cough (Cough over one week. ).    History of Present Illness  Patient is a 74-year-old female complaining of cough x 1 week.  Patient is using over-the-counter's with some effect.  Patient denies sick contacts.  She denies fevers.      Cough  Pertinent negatives include no chills, fever or shortness of breath.       Past Medical History  Allergies as of 05/27/2025 - Reviewed 05/27/2025   Allergen Reaction Noted    Aspirin Other 09/27/2023    Iodinated contrast media Hives 10/23/2023    Lithium Unknown 09/27/2023    Shellfish derived Angioedema 10/23/2023    Adhesive Other 09/27/2023    Caffeine Insomnia 09/27/2023       Prescriptions Prior to Admission[1]     Medical History[2]    Surgical History[3]     reports that she quit smoking about 35 years ago. Her smoking use included cigarettes. She started smoking about 45 years ago. She has a 2.5 pack-year smoking history. She has never used smokeless tobacco. She reports that she does not currently use alcohol. She reports that she does not use drugs.    Review of Systems  Review of Systems   Constitutional:  Negative for activity change, appetite change, chills, fatigue and fever.   HENT:  Positive for congestion. Negative for voice change.    Respiratory:  Positive for cough. Negative for shortness of breath.    Gastrointestinal:  Negative for abdominal pain.   All other systems reviewed and are negative.                                 Objective    There were no vitals filed for this visit.  No LMP recorded. Patient has had a hysterectomy.    Physical Exam  Constitutional:       General: She is not in acute distress.     Appearance: She is not ill-appearing or toxic-appearing.   HENT:      Head: Normocephalic and atraumatic.   Eyes:      Extraocular Movements: Extraocular movements intact.   Cardiovascular:      Rate and Rhythm: Normal rate. "   Pulmonary:      Effort: Pulmonary effort is normal. No respiratory distress.      Breath sounds: Normal breath sounds. No wheezing.   Musculoskeletal:         General: No swelling or deformity.   Skin:     General: Skin is warm and dry.   Neurological:      Mental Status: She is alert and oriented to person, place, and time.         Procedures    Point of Care Test & Imaging Results from this visit  No results found for this visit on 05/27/25.   Imaging  No results found.    Cardiology, Vascular, and Other Imaging  No other imaging results found for the past 2 days      Diagnostic study results (if any) were reviewed by Christi Harmon MD.    Assessment/Plan   Allergies, medications, history, and pertinent labs/EKGs/Imaging reviewed by Christi Harmon MD.     Medical Decision Making  ***    Orders and Diagnoses  There are no diagnoses linked to this encounter.    Medical Admin Record      Patient disposition: { Disposition:32528}    Electronically signed by hCristi Harmon MD  2:44 PM           [1] (Not in a hospital admission)  [2]   Past Medical History:  Diagnosis Date    Adverse effect of anesthesia     woke during anesthesia    Anxiety     Arthritis     Cervical disc disease     Chronic pain disorder     Fibromyalgia, primary     GERD (gastroesophageal reflux disease)     Joint pain     Low back pain     Lumbosacral disc disease     Migraine     Neck pain     Osteoarthritis     Peripheral neuropathy     Spinal stenosis    [3]   Past Surgical History:  Procedure Laterality Date    CARPAL TUNNEL RELEASE Right     CATARACT EXTRACTION      CERVICAL FUSION      CERVICAL FUSION      GASTRIC BYPASS      HYSTERECTOMY      partial    JOINT REPLACEMENT      LAMINECTOMY      MR HEAD ANGIO WO IV CONTRAST  10/14/2019    MR HEAD ANGIO WO IV CONTRAST [facility]    NECK SURGERY      ORTHOPEDIC SURGERY      SPINAL FUSION      TOTAL SHOULDER ARTHROPLASTY Right 2023    TRIGGER FINGER RELEASE Right       neuropathy     Spinal stenosis    [3]   Past Surgical History:  Procedure Laterality Date    CARPAL TUNNEL RELEASE Right     CATARACT EXTRACTION      CERVICAL FUSION      CERVICAL FUSION      GASTRIC BYPASS      HYSTERECTOMY      partial    JOINT REPLACEMENT      LAMINECTOMY      MR HEAD ANGIO WO IV CONTRAST  10/14/2019    MR HEAD ANGIO WO IV CONTRAST [facility]    NECK SURGERY      ORTHOPEDIC SURGERY      SPINAL FUSION      TOTAL SHOULDER ARTHROPLASTY Right 2023    TRIGGER FINGER RELEASE Right

## 2025-05-28 DIAGNOSIS — F41.9 ANXIETY: ICD-10-CM

## 2025-05-28 NOTE — TELEPHONE ENCOUNTER
Prescription request received and populated   Pharmacy populated  Last Office Visit: 2/18/25  Has upcoming ov 6/02//25

## 2025-05-29 RX ORDER — BUSPIRONE HYDROCHLORIDE 5 MG/1
TABLET ORAL
Qty: 60 TABLET | Refills: 1 | Status: SHIPPED | OUTPATIENT
Start: 2025-05-29

## 2025-06-02 ENCOUNTER — APPOINTMENT (OUTPATIENT)
Dept: ORTHOPEDIC SURGERY | Facility: CLINIC | Age: 75
End: 2025-06-02
Payer: MEDICARE

## 2025-06-02 ENCOUNTER — OFFICE VISIT (OUTPATIENT)
Dept: PRIMARY CARE | Facility: CLINIC | Age: 75
End: 2025-06-02
Payer: MEDICARE

## 2025-06-02 VITALS
OXYGEN SATURATION: 93 % | HEART RATE: 72 BPM | TEMPERATURE: 97 F | SYSTOLIC BLOOD PRESSURE: 106 MMHG | WEIGHT: 136.8 LBS | HEIGHT: 63 IN | BODY MASS INDEX: 24.24 KG/M2 | DIASTOLIC BLOOD PRESSURE: 62 MMHG

## 2025-06-02 DIAGNOSIS — M25.511 CHRONIC RIGHT SHOULDER PAIN: Primary | ICD-10-CM

## 2025-06-02 DIAGNOSIS — G43.909 MIGRAINE WITHOUT STATUS MIGRAINOSUS, NOT INTRACTABLE, UNSPECIFIED MIGRAINE TYPE: ICD-10-CM

## 2025-06-02 DIAGNOSIS — E78.5 DYSLIPIDEMIA: ICD-10-CM

## 2025-06-02 DIAGNOSIS — E55.9 VITAMIN D DEFICIENCY: ICD-10-CM

## 2025-06-02 DIAGNOSIS — F31.60 BIPOLAR AFFECTIVE DISORDER, CURRENT EPISODE MIXED, CURRENT EPISODE SEVERITY UNSPECIFIED (MULTI): ICD-10-CM

## 2025-06-02 DIAGNOSIS — G89.29 CHRONIC RIGHT SHOULDER PAIN: Primary | ICD-10-CM

## 2025-06-02 DIAGNOSIS — R05.8 POST-VIRAL COUGH SYNDROME: Primary | ICD-10-CM

## 2025-06-02 DIAGNOSIS — R73.01 IMPAIRED FASTING BLOOD SUGAR: ICD-10-CM

## 2025-06-02 DIAGNOSIS — Z96.611 S/P SHOULDER REPLACEMENT, RIGHT: ICD-10-CM

## 2025-06-02 DIAGNOSIS — M77.12 LEFT TENNIS ELBOW: ICD-10-CM

## 2025-06-02 PROCEDURE — L3670 SO ACRO/CLAV CAN WEB PRE OTS: HCPCS | Performed by: NURSE PRACTITIONER

## 2025-06-02 PROCEDURE — 99214 OFFICE O/P EST MOD 30 MIN: CPT | Performed by: NURSE PRACTITIONER

## 2025-06-02 PROCEDURE — 1125F AMNT PAIN NOTED PAIN PRSNT: CPT | Performed by: FAMILY MEDICINE

## 2025-06-02 PROCEDURE — 1160F RVW MEDS BY RX/DR IN RCRD: CPT | Performed by: FAMILY MEDICINE

## 2025-06-02 PROCEDURE — 1160F RVW MEDS BY RX/DR IN RCRD: CPT | Performed by: NURSE PRACTITIONER

## 2025-06-02 PROCEDURE — 1159F MED LIST DOCD IN RCRD: CPT | Performed by: NURSE PRACTITIONER

## 2025-06-02 PROCEDURE — 99214 OFFICE O/P EST MOD 30 MIN: CPT | Performed by: FAMILY MEDICINE

## 2025-06-02 PROCEDURE — 1159F MED LIST DOCD IN RCRD: CPT | Performed by: FAMILY MEDICINE

## 2025-06-02 PROCEDURE — 1036F TOBACCO NON-USER: CPT | Performed by: FAMILY MEDICINE

## 2025-06-02 PROCEDURE — 1036F TOBACCO NON-USER: CPT | Performed by: NURSE PRACTITIONER

## 2025-06-02 PROCEDURE — G2211 COMPLEX E/M VISIT ADD ON: HCPCS | Performed by: FAMILY MEDICINE

## 2025-06-02 RX ORDER — BENZONATATE 200 MG/1
200 CAPSULE ORAL 3 TIMES DAILY PRN
Qty: 30 CAPSULE | Refills: 1 | Status: SHIPPED | OUTPATIENT
Start: 2025-06-02

## 2025-06-02 RX ORDER — CODEINE PHOSPHATE AND GUAIFENESIN 10; 100 MG/5ML; MG/5ML
5 SOLUTION ORAL 2 TIMES DAILY PRN
Qty: 50 ML | Refills: 0 | Status: SHIPPED | OUTPATIENT
Start: 2025-06-02 | End: 2025-06-07

## 2025-06-02 ASSESSMENT — PATIENT HEALTH QUESTIONNAIRE - PHQ9
6. FEELING BAD ABOUT YOURSELF - OR THAT YOU ARE A FAILURE OR HAVE LET YOURSELF OR YOUR FAMILY DOWN: NOT AT ALL
2. FEELING DOWN, DEPRESSED OR HOPELESS: NOT AT ALL
SUM OF ALL RESPONSES TO PHQ9 QUESTIONS 1 AND 2: 0
5. POOR APPETITE OR OVEREATING: MORE THAN HALF THE DAYS
1. LITTLE INTEREST OR PLEASURE IN DOING THINGS: NOT AT ALL
7. TROUBLE CONCENTRATING ON THINGS, SUCH AS READING THE NEWSPAPER OR WATCHING TELEVISION: SEVERAL DAYS
9. THOUGHTS THAT YOU WOULD BE BETTER OFF DEAD, OR OF HURTING YOURSELF: NOT AT ALL
4. FEELING TIRED OR HAVING LITTLE ENERGY: SEVERAL DAYS
SUM OF ALL RESPONSES TO PHQ QUESTIONS 1-9: 6
8. MOVING OR SPEAKING SO SLOWLY THAT OTHER PEOPLE COULD HAVE NOTICED. OR THE OPPOSITE, BEING SO FIGETY OR RESTLESS THAT YOU HAVE BEEN MOVING AROUND A LOT MORE THAN USUAL: NOT AT ALL
3. TROUBLE FALLING OR STAYING ASLEEP OR SLEEPING TOO MUCH: MORE THAN HALF THE DAYS

## 2025-06-02 ASSESSMENT — PAIN SCALES - GENERAL: PAINLEVEL_OUTOF10: 8

## 2025-06-02 ASSESSMENT — LIFESTYLE VARIABLES: HOW MANY STANDARD DRINKS CONTAINING ALCOHOL DO YOU HAVE ON A TYPICAL DAY: PATIENT DOES NOT DRINK

## 2025-06-02 NOTE — PROGRESS NOTES
"History Of Present Illness  Pauline Orta \"Megan" is a 74 y.o. female presenting for Follow-up ( follow up.)  .    HPI     Sick for weeks. Went to urgent care 5/27/25 and was treated with z-pack. Also just finished medrol dose pack yesterday for left hand from orthopedic doctor. Still coughing, feels tight. Coughing fits sometimes keeps her up at night.    Anxiety okay. Son with major medical issue and daughter who is an alcoholic.   Buspirone helps, uses hydroxyzine as needed for anxiety and it doesn't make her sleepy. Has support group through AA.     Migraines about 6-7 times a month. Topiramate was helping to reduce frequency. However, neck pain flared up with her lifting more, moving son's things by herself so she's been more active than normal    Complains of left lateral pain. Has been moving boxes of her son's things.     Past Medical History  Patient Active Problem List    Diagnosis Date Noted    Bipolar affective disorder, current episode mixed, current episode severity unspecified (Multi) 06/02/2025    Cervical radiculopathy 02/11/2025    Unilateral primary osteoarthritis of first carpometacarpal joint, right hand 12/13/2024    Left foot pain 08/29/2024    Irritable bowel syndrome 04/03/2024    Unintended weight loss 04/03/2024    Family history of dementia 04/03/2024    Cervical spondylosis 01/26/2024    Foraminal stenosis of cervical region 01/26/2024    Status post total shoulder arthroplasty, right 12/08/2023    Gastroesophageal reflux disease 11/03/2023    Arthritis of right shoulder region 11/03/2023    Anxiety 09/27/2023    Arthritis of right glenohumeral joint 09/27/2023    Chronic pain 09/27/2023    Osteoarthritis 09/27/2023    Migraine headache 08/12/2023    KALLI (obstructive sleep apnea) 04/29/2020    Osteopenia, senile 05/09/2019    Fibromyalgia 10/24/2012    Spinal stenosis of lumbar region 09/26/2012    Degeneration of lumbar or lumbosacral intervertebral disc 07/15/2011    " Postlaminectomy syndrome, cervical region 11/24/2008    Primary insomnia 10/10/2006        Medications  Medications ordered prior to the current encounter[1]     Surgical History  She has a past surgical history that includes MR angio head wo IV contrast (10/14/2019); Gastric bypass; Cervical fusion; Hysterectomy; Cataract extraction; Cervical fusion; Carpal tunnel release (Right); Trigger finger release (Right); Laminectomy; Neck surgery; orthopedic surgery; Spinal fusion; Joint replacement; and Total shoulder arthroplasty (Right, 2023).     Social History  She reports that she quit smoking about 35 years ago. Her smoking use included cigarettes. She started smoking about 45 years ago. She has a 2.5 pack-year smoking history. She has never used smokeless tobacco. She reports that she does not currently use alcohol. She reports that she does not use drugs.    Lives in senior apartment alone. Daughter who is local an alcoholic. Daughter Nanette in [place] is backup HCPOA.     Family History  Family History[2]     Allergies  Aspirin, Iodinated contrast media, Lithium, Shellfish derived, Adhesive, and Caffeine    Immunizations  Immunization History   Administered Date(s) Administered    COVID-19, mRNA, LNP-S, PF, 30 mcg/0.3 mL dose 02/24/2021, 03/17/2021, 09/27/2021    Flu vaccine, quadrivalent, high-dose, preservative free, age 65y+ (FLUZONE) 09/17/2020, 07/26/2022, 09/07/2023    Flu vaccine, trivalent, preservative free, HIGH-DOSE, age 65y+ (Fluzone) 09/09/2014, 09/24/2015, 09/19/2017, 09/25/2019, 09/05/2024    Flu vaccine, trivalent, preservative free, no egg protein, age 6 months or greater (Flucelvax) 09/27/2017    Influenza, Seasonal, Quadrivalent, Adjuvanted 09/15/2021, 07/26/2022    Influenza, Unspecified 09/07/2013    Influenza, seasonal, injectable 09/17/2013, 09/13/2016    Novel influenza-H1N1-09, preservative-free 11/15/2009    Pfizer COVID-19 vaccine, 12 years and older, (30mcg/0.3mL) (Comirnaty)  "10/19/2023, 09/05/2024    Pfizer COVID-19 vaccine, bivalent, age 12 years and older (30 mcg/0.3 mL) 09/24/2022    Pfizer Gray Cap SARS-CoV-2 04/05/2022    Pneumococcal conjugate vaccine, 13-valent (PREVNAR 13) 02/05/2016    Pneumococcal conjugate vaccine, 20-valent (PREVNAR 20) 04/03/2024    Pneumococcal polysaccharide vaccine, 23-valent, age 2 years and older (PNEUMOVAX 23) 03/09/2012, 02/09/2017    RESPIRATORY SYNCYTIAL VIRUS (RSV), ELIGIBLE PREGNANT PTS, 0.5 ML (ABRYSVO) 10/19/2023    Tdap vaccine, age 7 year and older (BOOSTRIX, ADACEL) 05/06/2013    Zoster vaccine, recombinant, adult (SHINGRIX) 07/30/2018, 10/15/2018        ROS  Negative, except as discussed in HPI     Vitals  /62   Pulse 72   Temp 36.1 °C (97 °F)   Ht 1.6 m (5' 3\")   Wt 62.1 kg (136 lb 12.8 oz)   SpO2 93%   BMI 24.23 kg/m²      Physical Exam  Vitals and nursing note reviewed.   Constitutional:       General: She is not in acute distress.     Appearance: Normal appearance.   Cardiovascular:      Rate and Rhythm: Normal rate and regular rhythm.      Heart sounds: Normal heart sounds.   Pulmonary:      Effort: No respiratory distress.      Breath sounds: Normal breath sounds.   Neurological:      General: No focal deficit present.      Mental Status: She is alert. Mental status is at baseline.         Relevant Results  Lab Results   Component Value Date    WBC 5.7 02/25/2025    WBC 5.6 02/25/2025    HGB 12.3 02/25/2025    HGB 12.3 02/25/2025    HCT 36.9 02/25/2025    HCT 37.2 02/25/2025    MCV 93.7 02/25/2025    MCV 92.5 02/25/2025     02/25/2025     02/25/2025     Lab Results   Component Value Date     02/25/2025     11/07/2024    K 4.2 02/25/2025    K 4.4 11/07/2024     02/25/2025     11/07/2024    CO2 28 02/25/2025    CO2 30 11/07/2024    BUN 20 02/25/2025    BUN 21 11/07/2024    CREATININE 0.91 02/25/2025    CREATININE 0.81 11/07/2024    CALCIUM 9.3 02/25/2025    CALCIUM 9.5 11/07/2024    PROT " "6.8 02/25/2025    PROT 7.1 11/07/2024    BILITOT 0.3 02/25/2025    BILITOT 0.4 11/07/2024    ALKPHOS 67 02/25/2025    ALKPHOS 82 11/07/2024    ALT 17 02/25/2025    ALT 15 11/07/2024    AST 20 02/25/2025    AST 20 11/07/2024    GLUCOSE 84 02/25/2025    GLUCOSE 86 11/07/2024     Lab Results   Component Value Date    HGBA1C 5.7 (H) 02/25/2025     Lab Results   Component Value Date    TSH 1.81 02/25/2025      Lab Results   Component Value Date    CHOL 217 (H) 11/07/2024    TRIG 122 11/07/2024    HDL 61.8 11/07/2024           Assessment/Plan   Pauline \"Rebecca\" was seen today for follow-up.  Diagnoses and all orders for this visit:  Post-viral cough syndrome (Primary)  -     benzonatate (Tessalon) 200 mg capsule; Take 1 capsule (200 mg) by mouth 3 times a day as needed for cough. Do not crush or chew.  -     Cancel: CBC; Future  -     CBC; Future  -     codeine-guaifenesin (Robitussin-AC)  mg/5 mL syrup; Take 5 mL by mouth 2 times a day as needed for cough for up to 5 days.  Migraine without status migrainosus, not intractable, unspecified migraine type  -     Discontinue: rimegepant (Nurtec ODT) 75 mg tablet,disintegrating; Dissolve 1 tablet (75 mg) in the mouth every other day.  -     rimegepant (Nurtec ODT) 75 mg tablet,disintegrating; Dissolve 1 tablet (75 mg) in the mouth every other day.  Dyslipidemia  -     Cancel: Comprehensive Metabolic Panel; Future  -     Cancel: Lipid Panel; Future  -     Cancel: TSH with reflex to Free T4 if abnormal; Future  -     Comprehensive Metabolic Panel; Future  -     Lipid Panel; Future  -     TSH with reflex to Free T4 if abnormal; Future  Impaired fasting blood sugar  -     Cancel: Hemoglobin A1C; Future  -     Hemoglobin A1C; Future  Vitamin D deficiency  -     Cancel: Vitamin D 25-Hydroxy,Total (for eval of Vitamin D levels); Future  -     Vitamin D 25-Hydroxy,Total (for eval of Vitamin D levels); Future  Left tennis elbow  Comments:  conservative tx  Orders:  -     Tennis " Elbow Strap  Bipolar affective disorder, current episode mixed, current episode severity unspecified (Multi)  Comments:  stable, declines psych referral         Counseling:   Medication education:   -Education:  The patient is counseled regarding potential side-effects of any and all new medications  -Understanding:  Patient expressed understanding of information discussed today  -Adherence:  No barriers to adherence identified    Final treatment plan is a result of shared decision making with patient.         Jaime Smith MD          [1]   Current Outpatient Medications   Medication Sig Dispense Refill    ascorbic acid (Vitamin C) 500 mg tablet Take 1 tablet (500 mg) by mouth once daily.      busPIRone (Buspar) 5 mg tablet TAKE 1 TABLET(5 MG) BY MOUTH TWICE DAILY AS NEEDED FOR ANXIETY 60 tablet 1    calcium citrate-vitamin D2 250 mg-2.5 mcg (100 unit) tablet Take 1 tablet by mouth once daily.      cholecalciferol (Vitamin D-3) 125 MCG (5000 UT) capsule Take 1 capsule (125 mcg) by mouth once daily.      cyanocobalamin (Vitamin B-12) 250 mcg tablet Take 1 tablet (250 mcg) by mouth once daily.      diclofenac sodium (Voltaren) 1 % gel gel Apply 4.5 inches (4 g) topically 4 times a day as needed.      estradiol (Estrace) 0.01 % (0.1 mg/gram) vaginal cream Insert 0.25 Applicatorfuls (1 g) into the vagina 2 times a week. INSERT 1 GRAM VAGINALLY 2 TIMES A WEEK 42.5 g 1    hydrOXYzine HCL (Atarax) 25 mg tablet Take 1 tablet (25 mg) by mouth 3 times a day. 90 tablet 1    multivitamin tablet Take 1 tablet by mouth once daily.      omega 3-dha-epa-fish oil (Fish OiL) 1,000 mg (120 mg-180 mg) capsule Take 1 capsule (1,000 mg) by mouth once daily.      omeprazole (PriLOSEC) 20 mg DR capsule Take 1 capsule (20 mg) by mouth 2 times a day before meals. Do not crush or chew. 180 capsule 1    ondansetron ODT (Zofran-ODT) 4 mg disintegrating tablet DISSOLVE ONE TABLET ON/UNDER TONGUE EVERY 8 HOURS AS NEEDED FOR NAUSEA OR VOMITING  FOR UP TO 7 DAYS 20 tablet 0    polyethylene glycol (Glycolax, Miralax) 17 gram packet Take 17 g by mouth once daily. 90 packet 1    Restasis 0.05 % ophthalmic emulsion Administer 1 drop into both eyes 2 times a day as needed (dry eyes).      tiZANidine (Zanaflex) 4 mg tablet Take 1 tablet (4 mg) by mouth every 8 hours if needed for muscle spasms. 90 tablet 2    topiramate (Topamax) 25 mg tablet Take 1 tablet (25 mg) by mouth once daily. 90 tablet 1    TUMERIC-GING-OLIVE-OREG-CAPRYL ORAL Take 1 mg by mouth once daily.      benzonatate (Tessalon) 200 mg capsule Take 1 capsule (200 mg) by mouth 3 times a day as needed for cough. Do not crush or chew. 30 capsule 1    codeine-guaifenesin (Robitussin-AC)  mg/5 mL syrup Take 5 mL by mouth 2 times a day as needed for cough for up to 5 days. 50 mL 0    gabapentin (Neurontin) 800 mg tablet Take 1 tablet (800 mg) by mouth 3 times a day. 90 tablet 3    rimegepant (Nurtec ODT) 75 mg tablet,disintegrating Dissolve 1 tablet (75 mg) in the mouth every other day. 27 tablet 1     No current facility-administered medications for this visit.   [2]   Family History  Problem Relation Name Age of Onset    Diabetes Mother Racquel Gonzalesyomaira     Arthritis Mother Racquel Gonzalesyomaira     Osteoporosis Mother Racquel Gonzalesyomaira     Cancer Father Ahsan Gonzalesyomaira     Lung disease Father Ahsan Gonzalesyomaira     Heart disease Father Ahsan Castellanofede     Alcohol abuse Father Ahsan Bar     Alcohol abuse Brother Cr Dinero     Alcohol abuse Sister Mary Jones     Diabetes Son Billy Orta     Drug abuse Brother Cr

## 2025-06-02 NOTE — PROGRESS NOTES
Provider Impression/Assessment:  Pauline Orta is a pleasant 74 y.o. female returning to clinic for their right shoulder. They are over one year status post right reverse shoulder replacement with now continued pain with their shoulder.      Patient Discussion/Plan:   Pauline Orta does understand that surgery is elective and completely up to them. We reviewed their options again today at length. They may continue to live with this or we can consider arthroscopic surgery, as previously recommended by Dr Simpson. We reviewed the results of the recent CT scan of right shoulder today with patient.      We reviewed the risk of shoulder arthroscopy with the patient today, being largely that there is no improvement after procedure. 60% of people in general get better, 20% of the time we find something else, including a larger rotator cuff tear or something wrong with the implants. There is also is a very small percentage that show infection. The vast majority of prior patients appreciate going through the minimally invasive process as there is really minimal rehab and they get their questions answered.      At this point, verbally understanding their options, they would like to proceed with an arthroscopic surgery as previously recommended by Dr Simpson.     PLAN is for RIGHT shoulder arthroscopic decompression, debridement, culture and biopsy.      We reviewed the hospital course following surgery. They will have no limits after surgery. Expect to be in a sling for a day or two after surgery. We do not anticipate a hospital stay for them. We will then get patient into therapy to begin rehab. They understand that if this does not work, there is a chance of further surgery afterwards.      The risks, benefits and alternatives of shoulder surgery were discussed at length with Dr Simpson previously. These were reviewed today. The risks of surgery are infection, dislocation, nerve injury, blood loss. The  benefits are pain relief and restoration of function. The patient verbalize an understanding of the risks, benefits, alternatives and the expected outcomes and wishes to proceed with elective shoulder surgery at this time.      At this point we will plan for surgery on 7/3/25 at [facility].  This will be out-patient surgery. They will need to be seen and cleared by the Anesthesia team prior to surgery date.      All patient's questions were answered today to their satisfaction and they are in agreement with the above plan. They know how to reach the office if there are any additional questions prior to surgery date. Patient was discussed with Dr Simpson and he is in agreement with the plan.      Should you have any questions or concerns after your visit today, please do not hesitate to call the office at [phone]. I am honored to be a provider on your health care team and remain dedicated to helping you achieve your healthcare goals    -------------------------------------------------------------------------------------------------  CHIEF COMPLAINT:  FUV - RIGHT Shoulder  Pre-Op Evaluation     History of Present Illness:  Pauline is a 74 y.o. right hand dominant female presenting today for evaluation of their right shoulder. This is a second opinion visit. She explains that she had a right reverse done 5 months ago in November 2023 with Dr. Castillo. She was allergic to the surgical tape but had no other post operative complications. Initially she improved, but as she did PT she had increasing pain. She saw Dr. Herrera two weeks ago who found inflammatory markers. Her pain is mainly with external rotation, pushing and lifting. Her left shoulder is not bothersome today, but she does note she has arthritis in most of her joints.      2/24/25  Pauline Orta is a 74 y.o. female returning to the clinic for a follow up visit regarding her right shoulder. She explains that since surgery she has had  pain in her right arm. She has had a full workup and has been seen by us an Dr. Trejo, and nothing was noted. Dr. Del Valle ordered her CT scan and advised she follow up with us.      6/2/25  Pauline Orta is a pleasant 74 y.o. female with a history of arthritis and right shoulder pain. She returns to clinic today for her right shoulder. Rebecca is 1+ year s/p right reverse done by Dr Castillo. Now having different pain after surgery. She is returning to clinic today for a pre op appointment for her right shoulder and ready to move forward with elective shoulder surgery. There is some intermittent pain that waxes and wanes between moderate and mild severity. Not sleeping without difficulty. She denies redness or warmth over incision site. Denies any fever, chills, or paresthesia. She has completed her pre-operative CT scan of shoulder. She was fitted for her post operative sling today. She would like surgery scheduled as soon as possible.     Allergy: Aspirin, Adhesive  Smoking Status: Former   Non Diabetic   BMI: 24    Past medical history, surgical history, social history and family history were all reviewed and are per the patient's health history. Family history is not pertinent to the presenting problem.     Review of Systems:   Review of systems for all 14 systems is negative for complaint except for the above noted findings in the history of present illness.    Family, social, and medical histories are obtained and reviewed.    Allergies:  Allergies[1]    Medical History[2]    Surgical History[3]    Social History     Socioeconomic History    Marital status:      Spouse name: Not on file    Number of children: Not on file    Years of education: Not on file    Highest education level: Not on file   Occupational History    Not on file   Tobacco Use    Smoking status: Former     Current packs/day: 0.00     Average packs/day: 0.3 packs/day for 10.0 years (2.5 ttl pk-yrs)     Types: Cigarettes      Start date: 3/18/1980     Quit date: 3/18/1990     Years since quittin.2    Smokeless tobacco: Never   Vaping Use    Vaping status: Never Used   Substance and Sexual Activity    Alcohol use: Not Currently    Drug use: Never    Sexual activity: Not Currently   Other Topics Concern    Not on file   Social History Narrative    Not on file     Social Drivers of Health     Financial Resource Strain: Not at Risk (2024)    Received from [facility]    Financial Resource Strain     Financial Resource Strain: 1   Food Insecurity: Not on File (2024)    Received from [facility]    Food Insecurity     Food: 0   Transportation Needs: Not at Risk (2024)    Received from [facility]    Transportation Needs     Transportation: 1   Physical Activity: Not on File (2024)    Received from [facility]    Physical Activity     Physical Activity: 0   Stress: At Risk (2024)    Received from [facility]    Stress     Stress: 2   Social Connections: Not on File (2024)    Received from [facility]    Social Connections     Connectedness: 0   Intimate Partner Violence: Not on file   Housing Stability: Not at Risk (2024)    Received from [facility]    Housing Stability     Housin       Medications:  Current Medications[4]    Family History:  Family History[5]    Diagnoses/Problems:  Pre-operative evaluation  Right shoulder pain  Status post right reverse shoulder replacement      Physical Examination:  Patient is a well-developed well-nourished female in no acute distress. Breathes with normal chest rises. Eye exam reveals round pupils. Awake alert and oriented x3.     Examination of left shoulder reveals range of motion 0-170° of forward elevation. 0-60° of external rotation. Internal rotation was to T12.     Examination of right shoulder reveals skin is intact. Well healed incision. No edema. No ecchymosis. No erythema or warmth. Neurovascular intact distally. Range of motion of the right shoulder revealed 0-80° of forward elevation,  correctable to 100° with paint 0-30° of external rotation, and internal rotation up to her side.  3+/5 strength        Results/Imaging:  CT of right shoulder shows no sign of any dislocation, fracture or malalignment. Radiology results discussed with Dr. Simpson and patient today.    *While intending to generate a timely document that accurately reflects the content of the visit, no guarantee can be provided that every grammatical or spelling mistake has been or will be identified or corrected. Thank you for your understanding.         [1]   Allergies  Allergen Reactions    Aspirin Other     hx gastric bypass    Iodinated Contrast Media Hives     Patient states she gets benadryl prior to any scans using IV contrast    Lithium Unknown     Muscle twitch occurred over 30 years ago    Shellfish Derived Angioedema     35 years ago    Adhesive Other     Redness - Irritation    Caffeine Insomnia   [2]   Past Medical History:  Diagnosis Date    Adverse effect of anesthesia     woke during anesthesia    Anxiety     Arthritis     Cervical disc disease     Chronic pain disorder     Fibromyalgia, primary     GERD (gastroesophageal reflux disease)     Joint pain     Low back pain     Lumbosacral disc disease     Migraine     Neck pain     Osteoarthritis     Peripheral neuropathy     Spinal stenosis    [3]   Past Surgical History:  Procedure Laterality Date    CARPAL TUNNEL RELEASE Right     CATARACT EXTRACTION      CERVICAL FUSION      CERVICAL FUSION      GASTRIC BYPASS      HYSTERECTOMY      partial    JOINT REPLACEMENT      LAMINECTOMY      MR HEAD ANGIO WO IV CONTRAST  10/14/2019    MR HEAD ANGIO WO IV CONTRAST [facility]    NECK SURGERY      ORTHOPEDIC SURGERY      SPINAL FUSION      TOTAL SHOULDER ARTHROPLASTY Right 2023    TRIGGER FINGER RELEASE Right    [4]   Current Outpatient Medications:     ascorbic acid (Vitamin C) 500 mg tablet, Take 1 tablet (500 mg) by mouth once daily., Disp: , Rfl:     benzonatate  (Tessalon) 200 mg capsule, Take 1 capsule (200 mg) by mouth 3 times a day as needed for cough. Do not crush or chew., Disp: 30 capsule, Rfl: 1    busPIRone (Buspar) 5 mg tablet, TAKE 1 TABLET(5 MG) BY MOUTH TWICE DAILY AS NEEDED FOR ANXIETY, Disp: 60 tablet, Rfl: 1    calcium citrate-vitamin D2 250 mg-2.5 mcg (100 unit) tablet, Take 1 tablet by mouth once daily., Disp: , Rfl:     cholecalciferol (Vitamin D-3) 125 MCG (5000 UT) capsule, Take 1 capsule (125 mcg) by mouth once daily., Disp: , Rfl:     codeine-guaifenesin (Robitussin-AC)  mg/5 mL syrup, Take 5 mL by mouth 2 times a day as needed for cough for up to 5 days., Disp: 50 mL, Rfl: 0    cyanocobalamin (Vitamin B-12) 250 mcg tablet, Take 1 tablet (250 mcg) by mouth once daily., Disp: , Rfl:     diclofenac sodium (Voltaren) 1 % gel gel, Apply 4.5 inches (4 g) topically 4 times a day as needed., Disp: , Rfl:     estradiol (Estrace) 0.01 % (0.1 mg/gram) vaginal cream, Insert 0.25 Applicatorfuls (1 g) into the vagina 2 times a week. INSERT 1 GRAM VAGINALLY 2 TIMES A WEEK, Disp: 42.5 g, Rfl: 1    gabapentin (Neurontin) 800 mg tablet, Take 1 tablet (800 mg) by mouth 3 times a day., Disp: 90 tablet, Rfl: 3    hydrOXYzine HCL (Atarax) 25 mg tablet, Take 1 tablet (25 mg) by mouth 3 times a day., Disp: 90 tablet, Rfl: 1    multivitamin tablet, Take 1 tablet by mouth once daily., Disp: , Rfl:     omega 3-dha-epa-fish oil (Fish OiL) 1,000 mg (120 mg-180 mg) capsule, Take 1 capsule (1,000 mg) by mouth once daily., Disp: , Rfl:     omeprazole (PriLOSEC) 20 mg DR capsule, Take 1 capsule (20 mg) by mouth 2 times a day before meals. Do not crush or chew., Disp: 180 capsule, Rfl: 1    ondansetron ODT (Zofran-ODT) 4 mg disintegrating tablet, DISSOLVE ONE TABLET ON/UNDER TONGUE EVERY 8 HOURS AS NEEDED FOR NAUSEA OR VOMITING FOR UP TO 7 DAYS, Disp: 20 tablet, Rfl: 0    polyethylene glycol (Glycolax, Miralax) 17 gram packet, Take 17 g by mouth once daily., Disp: 90 packet,  Rfl: 1    Restasis 0.05 % ophthalmic emulsion, Administer 1 drop into both eyes 2 times a day as needed (dry eyes)., Disp: , Rfl:     rimegepant (Nurtec ODT) 75 mg tablet,disintegrating, Dissolve 1 tablet (75 mg) in the mouth every other day., Disp: 27 tablet, Rfl: 1    tiZANidine (Zanaflex) 4 mg tablet, Take 1 tablet (4 mg) by mouth every 8 hours if needed for muscle spasms., Disp: 90 tablet, Rfl: 2    topiramate (Topamax) 25 mg tablet, Take 1 tablet (25 mg) by mouth once daily., Disp: 90 tablet, Rfl: 1    TUMERIC-GING-OLIVE-OREG-CAPRYL ORAL, Take 1 mg by mouth once daily., Disp: , Rfl:   [5]   Family History  Problem Relation Name Age of Onset    Diabetes Mother Racquel Dinero     Arthritis Mother Racquel Dinero     Osteoporosis Mother Racquel Dinero     Cancer Father Ahsan Gonzalesrjohana     Lung disease Father Ahsan Gonzalesrjohana     Heart disease Father Ahsan Gonzalesrn     Alcohol abuse Father Ahsan Castellanohorn     Alcohol abuse Brother Cr [facility]     Alcohol abuse Sister Mary Jones     Diabetes Son Billy Orta     Drug abuse Brother Cr

## 2025-06-04 ENCOUNTER — TELEPHONE (OUTPATIENT)
Dept: PRIMARY CARE | Facility: CLINIC | Age: 75
End: 2025-06-04
Payer: MEDICARE

## 2025-06-05 DIAGNOSIS — M47.812 CERVICAL SPONDYLOSIS WITHOUT MYELOPATHY: ICD-10-CM

## 2025-06-06 RX ORDER — GABAPENTIN 800 MG/1
TABLET ORAL
Qty: 90 TABLET | Refills: 3 | Status: SHIPPED | OUTPATIENT
Start: 2025-06-06

## 2025-06-09 ENCOUNTER — LAB (OUTPATIENT)
Dept: LAB | Facility: HOSPITAL | Age: 75
End: 2025-06-09
Payer: MEDICARE

## 2025-06-09 ENCOUNTER — PRE-ADMISSION TESTING (OUTPATIENT)
Dept: PREADMISSION TESTING | Facility: HOSPITAL | Age: 75
End: 2025-06-09
Payer: MEDICARE

## 2025-06-09 VITALS
RESPIRATION RATE: 12 BRPM | HEIGHT: 63 IN | TEMPERATURE: 98.6 F | HEART RATE: 75 BPM | OXYGEN SATURATION: 97 % | WEIGHT: 139.11 LBS | BODY MASS INDEX: 24.65 KG/M2 | DIASTOLIC BLOOD PRESSURE: 64 MMHG | SYSTOLIC BLOOD PRESSURE: 137 MMHG

## 2025-06-09 DIAGNOSIS — Z01.818 PREOP TESTING: Primary | ICD-10-CM

## 2025-06-09 LAB
ANION GAP SERPL CALC-SCNC: 13 MMOL/L (ref 10–20)
BASOPHILS # BLD AUTO: 0.02 X10*3/UL (ref 0–0.1)
BASOPHILS NFR BLD AUTO: 0.3 %
BUN SERPL-MCNC: 30 MG/DL (ref 6–23)
CALCIUM SERPL-MCNC: 9.1 MG/DL (ref 8.6–10.3)
CHLORIDE SERPL-SCNC: 107 MMOL/L (ref 98–107)
CO2 SERPL-SCNC: 26 MMOL/L (ref 21–32)
CREAT SERPL-MCNC: 0.92 MG/DL (ref 0.5–1.05)
EGFRCR SERPLBLD CKD-EPI 2021: 65 ML/MIN/1.73M*2
EOSINOPHIL # BLD AUTO: 0.09 X10*3/UL (ref 0–0.4)
EOSINOPHIL NFR BLD AUTO: 1.3 %
ERYTHROCYTE [DISTWIDTH] IN BLOOD BY AUTOMATED COUNT: 15.1 % (ref 11.5–14.5)
GLUCOSE SERPL-MCNC: 80 MG/DL (ref 74–99)
HCT VFR BLD AUTO: 37.6 % (ref 36–46)
HGB BLD-MCNC: 12.1 G/DL (ref 12–16)
IMM GRANULOCYTES # BLD AUTO: 0.03 X10*3/UL (ref 0–0.5)
IMM GRANULOCYTES NFR BLD AUTO: 0.4 % (ref 0–0.9)
LYMPHOCYTES # BLD AUTO: 2.76 X10*3/UL (ref 0.8–3)
LYMPHOCYTES NFR BLD AUTO: 40.3 %
MCH RBC QN AUTO: 30.1 PG (ref 26–34)
MCHC RBC AUTO-ENTMCNC: 32.2 G/DL (ref 32–36)
MCV RBC AUTO: 94 FL (ref 80–100)
MONOCYTES # BLD AUTO: 0.63 X10*3/UL (ref 0.05–0.8)
MONOCYTES NFR BLD AUTO: 9.2 %
NEUTROPHILS # BLD AUTO: 3.32 X10*3/UL (ref 1.6–5.5)
NEUTROPHILS NFR BLD AUTO: 48.5 %
NRBC BLD-RTO: 0 /100 WBCS (ref 0–0)
PLATELET # BLD AUTO: 297 X10*3/UL (ref 150–450)
POTASSIUM SERPL-SCNC: 4.5 MMOL/L (ref 3.5–5.3)
RBC # BLD AUTO: 4.02 X10*6/UL (ref 4–5.2)
SODIUM SERPL-SCNC: 141 MMOL/L (ref 136–145)
WBC # BLD AUTO: 6.9 X10*3/UL (ref 4.4–11.3)

## 2025-06-09 PROCEDURE — 85025 COMPLETE CBC W/AUTO DIFF WBC: CPT

## 2025-06-09 PROCEDURE — 80048 BASIC METABOLIC PNL TOTAL CA: CPT

## 2025-06-09 ASSESSMENT — ENCOUNTER SYMPTOMS
CARDIOVASCULAR NEGATIVE: 1
ENDOCRINE NEGATIVE: 1
VISUAL CHANGE: 1
ARTHRALGIAS: 1
RESPIRATORY NEGATIVE: 1
NEUROLOGICAL NEGATIVE: 1
GASTROINTESTINAL NEGATIVE: 1
CONSTITUTIONAL NEGATIVE: 1

## 2025-06-09 NOTE — CPM/PAT NURSE NOTE
"CPM/PAT Nurse Note      Name: Pauline Orta (Pauline Orta \"Rebecca\")  /Age: 1950/74 y.o.       Medical History[1]    Surgical History[2]    Patient  reports that she is not currently sexually active.    Family History[3]    Allergies[4]    Prior to Admission medications    Medication Sig Start Date End Date Taking? Authorizing Provider   ascorbic acid (Vitamin C) 500 mg tablet Take 1 tablet (500 mg) by mouth once daily.   Yes Historical Provider, MD   busPIRone (Buspar) 5 mg tablet TAKE 1 TABLET(5 MG) BY MOUTH TWICE DAILY AS NEEDED FOR ANXIETY 25  Yes Jaime Smith MD   calcium citrate-vitamin D2 250 mg-2.5 mcg (100 unit) tablet Take 1 tablet by mouth once daily.   Yes Historical Provider, MD   cholecalciferol (Vitamin D-3) 125 MCG (5000 UT) capsule Take 1 capsule (125 mcg) by mouth once daily.   Yes Historical Provider, MD   cyanocobalamin (Vitamin B-12) 250 mcg tablet Take 1 tablet (250 mcg) by mouth once daily.   Yes Historical Provider, MD   diclofenac sodium (Voltaren) 1 % gel gel Apply 4.5 inches (4 g) topically 4 times a day as needed. 23  Yes Historical Provider, MD   gabapentin (Neurontin) 800 mg tablet TAKE 1 TABLET(800 MG) BY MOUTH THREE TIMES DAILY 25  Yes Yash Grace MD   multivitamin tablet Take 1 tablet by mouth once daily.   Yes Historical Provider, MD   omega 3-dha-epa-fish oil (Fish OiL) 1,000 mg (120 mg-180 mg) capsule Take 1 capsule (1,000 mg) by mouth once daily.   Yes Historical Provider, MD   omeprazole (PriLOSEC) 20 mg DR capsule Take 1 capsule (20 mg) by mouth 2 times a day before meals. Do not crush or chew. 2/3/25 8/2/25 Yes Jaime Smith MD   ondansetron ODT (Zofran-ODT) 4 mg disintegrating tablet DISSOLVE ONE TABLET ON/UNDER TONGUE EVERY 8 HOURS AS NEEDED FOR NAUSEA OR VOMITING FOR UP TO 7 DAYS 25  Yes Jaime Smith MD   polyethylene glycol (Glycolax, Miralax) 17 gram packet Take 17 g by mouth once daily. 2/3/25 2/3/26 Yes Jaime Smith MD "   Restasis 0.05 % ophthalmic emulsion Administer 1 drop into both eyes 2 times a day as needed (dry eyes). 9/8/23  Yes Historical Provider, MD   tiZANidine (Zanaflex) 4 mg tablet Take 1 tablet (4 mg) by mouth every 8 hours if needed for muscle spasms. 2/3/25  Yes Jaime Smith MD   topiramate (Topamax) 25 mg tablet Take 1 tablet (25 mg) by mouth once daily. 2/3/25 2/3/26 Yes Jaime Smith MD   TUMERIC-GING-OLIVE-OREG-CAPRYL ORAL Take 1 mg by mouth once daily.   Yes Historical Provider, MD   benzonatate (Tessalon) 200 mg capsule Take 1 capsule (200 mg) by mouth 3 times a day as needed for cough. Do not crush or chew.  Patient not taking: Reported on 6/9/2025 6/2/25   Jaime Smith MD   codeine-guaifenesin (Robitussin-AC)  mg/5 mL syrup Take 5 mL by mouth 2 times a day as needed for cough for up to 5 days. 6/2/25 6/7/25  Jaime Smith MD   estradiol (Estrace) 0.01 % (0.1 mg/gram) vaginal cream Insert 0.25 Applicatorfuls (1 g) into the vagina 2 times a week. INSERT 1 GRAM VAGINALLY 2 TIMES A WEEK  Patient not taking: Reported on 6/9/2025 2/3/25   Jaime Smith MD   hydrOXYzine HCL (Atarax) 25 mg tablet Take 1 tablet (25 mg) by mouth 3 times a day.  Patient not taking: Reported on 6/9/2025 2/3/25   Jaime Smith MD   rimegepant (Nurtec ODT) 75 mg tablet,disintegrating Dissolve 1 tablet (75 mg) in the mouth every other day.  Patient not taking: Reported on 6/9/2025 6/2/25   Jaime Smith MD   gabapentin (Neurontin) 800 mg tablet Take 1 tablet (800 mg) by mouth 3 times a day. 1/23/25 6/6/25  Yash Grace MD        PAT ROS     DASI Risk Score      Flowsheet Row Ancillary Procedure from 10/23/2023 in [facility]   Can you take care of yourself (eat, dress, bathe, or use toilet)?  2.75 filed at 10/23/2023 1039   Can you walk indoors, such as around your house? 1.75 filed at 10/23/2023 1039   Can you walk a block or two on level ground?  2.75 filed at 10/23/2023 1039   Can you climb a flight of  stairs or walk up a hill? 0 filed at 10/23/2023 1039   Can you run a short distance? 0 filed at 10/23/2023 1039   Can you do light work around the house like dusting or washing dishes? 2.7 filed at 10/23/2023 1039   Can you do moderate work around the house like vacuuming, sweeping floors or carrying groceries? 3.5 filed at 10/23/2023 1039   Can you do heavy work around the house like scrubbing floors or lifting and moving heavy furniture?  8 filed at 10/23/2023 1039   Can you do yard work like raking leaves, weeding or pushing a mower? 4.5 filed at 10/23/2023 1039   Can you have sexual relations? 5.25 filed at 10/23/2023 1039   Can you participate in moderate recreational activities like golf, bowling, dancing, doubles tennis or throwing a baseball or football? 0 filed at 10/23/2023 1039   Can you participate in strenous sports like swimming, singles tennis, football, basketball, or skiing? 0 filed at 10/23/2023 1039   DASI SCORE 31.2 filed at 10/23/2023 1039   METS Score (Will be calculated only when all the questions are answered) 6.6 filed at 10/23/2023 1039          Caprini DVT Assessment      Flowsheet Row Admission (Discharged) from 11/3/2023 in [address] with Tom Castillo MD   DVT Score (IF A SCORE IS NOT CALCULATING, MUST SELECT A BMI TO COMPLETE) 2 filed at 11/03/2023 0907   RETIRED: Age 60-75 years filed at 11/03/2023 0907          Modified Frailty Index    No data to display       [id number] Stroke Risk Points  Current as of just now        N/A 0 to 9 Points:      Last Change: N/A          The [id number] risk score (Lip LUH, et al. 2009. © 2010 American College [employer]) quantifies the risk of stroke for a patient with atrial fibrillation. For patients without atrial fibrillation or under the age of 18 this score appears as N/A. Higher score values generally indicate higher risk of stroke.        This score is not applicable to this patient. Components are not  calculated.          Revised Cardiac Risk Index      Flowsheet Row Ancillary Procedure from 10/23/2023 in [facility]   High-Risk Surgery (Intraperitoneal, Intrathoracic,Suprainguinal vascular) 1 filed at 10/23/2023 1041   History of ischemic heart disease (History of MI, History of positive exercuse test, Current chest paint considered due to myocardial ischemia, Use of nitrate therapy, ECG with pathological Q Waves) 0 filed at 10/23/2023 1041   History of congestive heart failure (pulmonary edemia, bilateral rales or S3 gallop, Paroxysmal nocturnal dyspnea, CXR showing pulmonary vascular redistribution) 0 filed at 10/23/2023 1041   History of cerebrovascular disease (Prior TIA or stroke) 0 filed at 10/23/2023 1041   Pre-operative insulin treatment 0 filed at 10/23/2023 1041   Pre-operative creatinine>2 mg/dl 0 filed at 10/23/2023 1041   Revised Cardiac Risk Calculator 1 filed at 10/23/2023 1041          Apfel Simplified Score    No data to display       Risk Analysis Index Results This Encounter    No data found in the last 10 encounters.       Stop Bang Score      Flowsheet Row PAIN MANAG MED NERVE BRCH BLK from 1/16/2024 in [facility] OR with Irvin Hogue MD Ancillary Procedure from 10/23/2023 in [facility]   Do you snore loudly? 0 filed at 01/16/2024 0937 1 filed at 10/23/2023 1038   Do you often feel tired or fatigued after your sleep? 0 filed at 01/16/2024 0937 0 filed at 10/23/2023 1038   Has anyone ever observed you stop breathing in your sleep? 0 filed at 01/16/2024 0937 0 filed at 10/23/2023 1038   Do you have or are you being treated for high blood pressure? 0 filed at 01/16/2024 0937 0 filed at 10/23/2023 1038   Recent BMI (Calculated) 26.4 filed at 01/16/2024 0937 26.7 filed at 10/23/2023 1038   Is BMI greater than 35 kg/m2? 0=No filed at 01/16/2024 0937 0=No filed at 10/23/2023 1038   Age older than 50 years old? 1=Yes filed at 01/16/2024 0937  1=Yes filed at 10/23/2023 1038   Is your neck circumference greater than 17 inches (Male) or 16 inches (Female)? 0 filed at 01/16/2024 0937 0 filed at 10/23/2023 1038   Gender - Male 0=No filed at 01/16/2024 0937 --   STOP-BANG Total Score 1 filed at 01/16/2024 0937 --          Prodigy: High Risk  Total Score: 12              Prodigy Age Score           ARISCAT Score for Postoperative Pulmonary Complications    No data to display       Lang Perioperative Risk for Myocardial Infarction or Cardiac Arrest (YEFRI)    No data to display         Nurse Plan of Action:     After Visit Summary (AVS) reviewed and patient verbalized good understanding of medications and NPO instructions.              [1]   Past Medical History:  Diagnosis Date    Anxiety     Arthritis     Cervical disc disease     Chronic pain disorder     CTS (carpal tunnel syndrome) 2022    Fibromyalgia, primary     Fracture lumbar vertebra-closed (Multi) 1986    Fracture, foot 2024    GERD (gastroesophageal reflux disease)     Glenohumeral arthritis, right     Irritable bowel syndrome     Lumbosacral disc disease 1995    Migraine     Osteoarthritis     Osteopenia     Peripheral neuropathy     Rotator cuff syndrome 2024    Sleep apnea     Spinal stenosis 1990    Thoracic disc disease 1960&2024    Trigger finger 3/01/22    Unintended awareness under general anesthesia during procedure    [2]   Past Surgical History:  Procedure Laterality Date    CARPAL TUNNEL RELEASE Right 4/8/24    CATARACT EXTRACTION      CERVICAL FUSION      ELBOW SURGERY  6/12/75    GASTRIC BYPASS      HYSTERECTOMY      partial    LAMINECTOMY      MR HEAD ANGIO WO IV CONTRAST  10/14/2019    MR HEAD ANGIO WO IV CONTRAST [facility]    SPINAL FUSION  1995 & 2004    TOTAL SHOULDER ARTHROPLASTY Right 2023    TRIGGER FINGER RELEASE Right 4/08/24   [3]   Family History  Problem Relation Name Age of Onset    Diabetes Mother Racquel Bar     Arthritis Mother Racquel Bar      Osteoporosis Mother Racquel Dinero     Cancer Father Ahsan Gonzalesrjohana     Lung disease Father Ahsan Dinero     Heart disease Father Ahsan Dinero     Alcohol abuse Father Ahsan Dinero     Alcohol abuse Brother Cr Dinero     Broken bones Brother Cr Dinero     Dislocations Brother Cr Dinero     Severe sprains Brother Cr Dinero     Alcohol abuse Sister Mary Jones     Anesthesia problems Sister Mary Jones     Diabetes Son Billy Orta     Drug abuse Brother Cr    [4]   Allergies  Allergen Reactions    Aspirin Other     hx gastric bypass    Iodinated Contrast Media Hives     Patient states she gets benadryl prior to any scans using IV contrast    Lithium Unknown     Muscle twitch occurred over 30 years ago    Shellfish Derived Angioedema     35 years ago    Adhesive Other     Redness - Irritation    Caffeine Insomnia

## 2025-06-09 NOTE — CPM/PAT H&P
"[facility]/PAT Evaluation       Name: Pauline Orta (Pauline Orta \"Rebecca\")  /Age: 1950/74 y.o.     In-Person         Date of Consult: 25    Referring Provider:  Dr. Simpson    Date, Surgery, and Length: 25, right shoulder arthroscopic decompression and debridement, culture and biopsy, 130 minutes      Patient presents to [facility] for perioperative risk assessment prior to scheduled surgery. Patient presents s/p  right reverse shoulder replacement done in 2023 with Dr. Castillo. She was allergic to the surgical tape but had no other post operative complications. Initially she improved, but as she did PT she had increasing pain. Inflammatory markers were positive. Her pain is mainly with external rotation, pushing and lifting.         This note was created in part upon personal review of patient's medical records.      Patient states she has woken up during surgery in the past- breast biopsy & bladder lift surgery    Pt denies any past history of anesthetic complications such as PONV,  prolonged sedation, dental damage, aspiration, cardiac arrest, difficult intubation, difficult I.V. access or unexpected hospital admissions. No history of malignant hyperthermia and or pseudocholinesterase deficiency.    No history of blood transfusions.    The patient IS NOT a [employer] and will accept blood and blood products if medically indicated.     Type and screen NOT sent.      Past Medical History:   Diagnosis Date    Anxiety     Arthritis     Cervical disc disease     Chronic pain disorder     CTS (carpal tunnel syndrome)     Fibromyalgia, primary     Fracture lumbar vertebra-closed (Multi)     Fracture, foot     GERD (gastroesophageal reflux disease)     Glenohumeral arthritis, right     Irritable bowel syndrome     Lumbosacral disc disease     Migraine     Osteoarthritis     Osteopenia     Peripheral neuropathy     Rotator cuff syndrome     Sleep apnea     " Spinal stenosis     Thoracic disc disease &    Trigger finger 3/01/22    Unintended awareness under general anesthesia during procedure        Past Surgical History:   Procedure Laterality Date    BLADDER SUSPENSION      CARPAL TUNNEL RELEASE Right 22    CATARACT EXTRACTION      CERVICAL FUSION      COLONOSCOPY      ELBOW SURGERY  73    GASTRIC BYPASS      HYSTERECTOMY      partial    LAMINECTOMY      MR HEAD ANGIO WO IV CONTRAST  10/14/2019    MR HEAD ANGIO WO IV CONTRAST [facility]    SPINAL FUSION   &     TOTAL SHOULDER ARTHROPLASTY Right     TRIGGER FINGER RELEASE Right 22       Family History   Problem Relation Name Age of Onset    Diabetes Mother Racquel Linkhorn     Arthritis Mother Racquel Castellanohorn     Osteoporosis Mother Racquel Castellanohorjohana     Cancer Father Ahsan Linkhorn     Lung disease Father Ahsan Linkhorn     Heart disease Father Ahsan Linkhorn     Alcohol abuse Father Ahsan Linkhorn     Alcohol abuse Brother Cr Linkhorn     Broken bones Brother Cr Linkhorn     Dislocations Brother Cr Linkhorn     Severe sprains Brother Cr Linkcalliern     Alcohol abuse Sister Mary Jones     Anesthesia problems Sister Mary Jones     Diabetes Son Billy Orta     Drug abuse Brother Cr      Social History     Tobacco Use   Smoking Status Former    Current packs/day: 0.00    Average packs/day: 0.3 packs/day for 10.0 years (2.5 ttl pk-yrs)    Types: Cigarettes    Start date: 3/18/1980    Quit date: 3/18/1990    Years since quittin.2   Smokeless Tobacco Never     Social History     Substance and Sexual Activity   Alcohol Use Not Currently     Social History     Substance and Sexual Activity   Drug Use Never       Allergies   Allergen Reactions    Aspirin Other     hx gastric bypass    Iodinated Contrast Media Hives     Patient states she gets benadryl prior to any scans using IV contrast    Lithium Unknown     Muscle twitch occurred over 30 years  ago    Shellfish Derived Angioedema     35 years ago    Adhesive Other     Redness - Irritation    Caffeine Insomnia     Social History     Tobacco Use   Smoking Status Former    Current packs/day: 0.00    Average packs/day: 0.3 packs/day for 10.0 years (2.5 ttl pk-yrs)    Types: Cigarettes    Start date: 3/18/1980    Quit date: 3/18/1990    Years since quittin.2   Smokeless Tobacco Never     Social History     Substance and Sexual Activity   Alcohol Use Not Currently     Social History     Substance and Sexual Activity   Drug Use Never       Current Outpatient Medications   Medication Instructions    ascorbic acid (VITAMIN C) 500 mg, Daily    benzonatate (TESSALON) 200 mg, oral, 3 times daily PRN, Do not crush or chew.    busPIRone (Buspar) 5 mg tablet TAKE 1 TABLET(5 MG) BY MOUTH TWICE DAILY AS NEEDED FOR ANXIETY    calcium citrate-vitamin D2 250 mg-2.5 mcg (100 unit) tablet 1 tablet, Daily    cholecalciferol (VITAMIN D-3) 125 mcg, Daily    cyanocobalamin (VITAMIN B-12) 250 mcg, Daily    diclofenac sodium (VOLTAREN) 4 g, 4 times daily PRN    estradiol (ESTRACE) 1 g, vaginal, 2 times weekly, INSERT 1 GRAM VAGINALLY 2 TIMES A WEEK    gabapentin (Neurontin) 800 mg tablet TAKE 1 TABLET(800 MG) BY MOUTH THREE TIMES DAILY    hydrOXYzine HCL (ATARAX) 25 mg, oral, 3 times daily    multivitamin tablet 1 tablet, Daily    omega 3-dha-epa-fish oil (Fish OiL) 1,000 mg (120 mg-180 mg) capsule 1,000 mg, Daily    omeprazole (PRILOSEC) 20 mg, oral, 2 times daily before meals, Do not crush or chew.    ondansetron ODT (Zofran-ODT) 4 mg disintegrating tablet DISSOLVE ONE TABLET ON/UNDER TONGUE EVERY 8 HOURS AS NEEDED FOR NAUSEA OR VOMITING FOR UP TO 7 DAYS    polyethylene glycol (GLYCOLAX, MIRALAX) 17 g, oral, Daily    Restasis 0.05 % ophthalmic emulsion 1 drop, 2 times daily PRN    rimegepant (NURTEC ODT) 75 mg, oral, Every other day    tiZANidine (ZANAFLEX) 4 mg, oral, Every 8 hours PRN    topiramate (TOPAMAX) 25 mg, oral,  "Daily    TUMERIC-GING-OLIVE-OREG-CAPRYL ORAL 1 mg, Daily         PAT ROS:   Constitutional:   neg    Neuro/Psych:   neg    Eyes:    vision loss   use of corrective lenses  Ears:   neg    Nose:   neg    Mouth:    Full dentures  Throat:   neg    Neck:   Cardio:   neg    Respiratory:   neg    Endocrine:   neg    GI:   neg    :   neg    Musculoskeletal:    Left wrist in brace, uses cane and walker intermittently for assistance    arthralgias  Hematologic:   neg    Skin:  neg        Physical Exam  Vitals reviewed. Physical exam within normal limits.          PAT AIRWAY:   Airway:     Mallampati::  IV    Neck ROM::  Full  normal     upper dentures and lower dentures      Visit Vitals  /64   Pulse 75   Temp 37 °C (98.6 °F) (Tympanic)   Resp 12   Ht 1.605 m (5' 3.19\")   Wt 63.1 kg (139 lb 1.8 oz)   SpO2 97%   BMI 24.50 kg/m²   OB Status Hysterectomy   Smoking Status Former   BSA 1.68 m²       Assessment and plan:    Patient is a 74 year old female scheduled for right shoulder arthroscopic decompression and debridement, culture and biopsy with Dr. Simpson on 6/11/25.      Plan    Neuro:    Depression with anxiety- continue Buspar    Cardiovascular:    RCRI: 0 Risk of Mace: 0.4%    Caprini: 5     Patient denies any chest pain, tightness, heaviness, pressure, radiating pain, palpitations, irregular heartbeats, lightheadedness, cough, congestion, shortness of breath, FU, PND, near syncope, weight loss or gain.    Fair   functional capacity    EKG in PAT not obtained.    Pulmonary:  No pulmonary diagnosis, however patient is at increased risk of perioperative complications secondary to  age > 60, functional dependence  Stop Bang score is 3 placing patient at low risk for KALLI  ARISCAT: 26-44 points, 13.3% risk of in-hospital postoperative pulmonary complication  PRODIGY: Moderate risk for opioid induced respiratory depression    Renal/endo:  Recommendations to avoid nephrotoxic drugs and carefully monitor fluid status " to maintain euvolemia. Use dose adjusted medications as needed for the underlying level of renal function.      Heme:  Patient instructed to ambulate as soon as possible postoperatively to decrease thromboembolic risk.    Initiate mechanical DVT prophylaxis as soon as possible and initiate chemical prophylaxis when deemed safe from a bleeding standpoint post surgery.        Risk assessment complete.  This patient is INTERMEDIATE risk candidate undergoing MODERATE risk procedure, patient is medically optimized for surgery.      Labs/testing obtained in PAT on 6/9/25:  CBC, BMP    Lab Results   Component Value Date    WBC 6.9 06/09/2025    HGB 12.1 06/09/2025    HCT 37.6 06/09/2025    MCV 94 06/09/2025     06/09/2025     Lab Results   Component Value Date    GLUCOSE 80 06/09/2025    CALCIUM 9.1 06/09/2025     06/09/2025    K 4.5 06/09/2025    CO2 26 06/09/2025     06/09/2025    BUN 30 (H) 06/09/2025    CREATININE 0.92 06/09/2025       Follow up/communication: none      Preoperative medication instructions were provided and reviewed with the patient.  Any additional testing or evaluation was explained to the patient.  Nothing by mouth instructions were discussed and patient's questions were answered prior to conclusion to this encounter.  Patient verbalized understanding of preoperative instructions given in preadmission testing; discharge instructions available in EMR.    This note was dictated with speech recognition.  Minor errors may have been detected during use of speech recognition.    Charge not submitted as CPM/PAT visit within 72 hours of scheduled surgery.

## 2025-06-09 NOTE — PREPROCEDURE INSTRUCTIONS
Medication List            Accurate as of June 9, 2025 10:42 AM. Always use your most recent med list.                ascorbic acid 500 mg tablet  Commonly known as: Vitamin C  Additional Medication Adjustments for Surgery: Other (Comment)  Notes to patient: Stop today 6/9/25     benzonatate 200 mg capsule  Commonly known as: Tessalon  Take 1 capsule (200 mg) by mouth 3 times a day as needed for cough. Do not crush or chew.  Medication Adjustments for Surgery: Do Not take on the morning of surgery     busPIRone 5 mg tablet  Commonly known as: Buspar  TAKE 1 TABLET(5 MG) BY MOUTH TWICE DAILY AS NEEDED FOR ANXIETY  Medication Adjustments for Surgery: Take/Use as prescribed     calcium citrate-vitamin D2 250 mg-2.5 mcg (100 unit) tablet  Additional Medication Adjustments for Surgery: Other (Comment)  Notes to patient: Stop today 6/9/25     cholecalciferol 125 mcg (5,000 units) capsule  Commonly known as: Vitamin D-3     cyanocobalamin 250 mcg tablet  Commonly known as: Vitamin B-12  Additional Medication Adjustments for Surgery: Other (Comment)  Notes to patient: Stop today 6/9/25     diclofenac sodium 1 % gel  Commonly known as: Voltaren  Additional Medication Adjustments for Surgery: Other (Comment)  Notes to patient: Stop today 6/9/25     estradiol 0.01 % (0.1 mg/gram) vaginal cream  Commonly known as: Estrace  Insert 0.25 Applicatorfuls (1 g) into the vagina 2 times a week. INSERT 1 GRAM VAGINALLY 2 TIMES A WEEK     Fish OiL 1,000 (120-180) mg capsule  Generic drug: omega 3-dha-epa-fish oil  Medication Adjustments for Surgery: Do Not take on the morning of surgery     gabapentin 800 mg tablet  Commonly known as: Neurontin  TAKE 1 TABLET(800 MG) BY MOUTH THREE TIMES DAILY  Medication Adjustments for Surgery: Take/Use as prescribed     hydrOXYzine HCL 25 mg tablet  Commonly known as: Atarax  Take 1 tablet (25 mg) by mouth 3 times a day.  Medication Adjustments for Surgery: Do Not take on the morning of surgery      multivitamin tablet  Additional Medication Adjustments for Surgery: Other (Comment)  Notes to patient: Stop today 6/9/25     omeprazole 20 mg DR capsule  Commonly known as: PriLOSEC  Take 1 capsule (20 mg) by mouth 2 times a day before meals. Do not crush or chew.  Medication Adjustments for Surgery: Take/Use as prescribed     ondansetron ODT 4 mg disintegrating tablet  Commonly known as: Zofran-ODT  DISSOLVE ONE TABLET ON/UNDER TONGUE EVERY 8 HOURS AS NEEDED FOR NAUSEA OR VOMITING FOR UP TO 7 DAYS  Medication Adjustments for Surgery: Take/Use as prescribed     polyethylene glycol 17 gram packet  Commonly known as: Glycolax, Miralax  Take 17 g by mouth once daily.  Medication Adjustments for Surgery: Do Not take on the morning of surgery     Restasis 0.05 % ophthalmic emulsion  Generic drug: cycloSPORINE  Medication Adjustments for Surgery: Take/Use as prescribed     rimegepant 75 mg tablet,disintegrating  Commonly known as: Nurtec ODT  Dissolve 1 tablet (75 mg) in the mouth every other day.  Medication Adjustments for Surgery: Do Not take on the morning of surgery     tiZANidine 4 mg tablet  Commonly known as: Zanaflex  Take 1 tablet (4 mg) by mouth every 8 hours if needed for muscle spasms.  Medication Adjustments for Surgery: Take/Use as prescribed     topiramate 25 mg tablet  Commonly known as: Topamax  Take 1 tablet (25 mg) by mouth once daily.  Medication Adjustments for Surgery: Take/Use as prescribed     TUMERIC-GING-OLIVE-OREG-CAPRYL ORAL  Additional Medication Adjustments for Surgery: Other (Comment)  Notes to patient: Stop today 6/9/25                Preoperative Deep Breathing Exercises  Why it is important to do deep breathing exercises before my surgery?  Deep breathing exercises strengthen your breathing muscles.  This helps you to recover after your surgery and decreases the chance of breathing complications.  How are the deep breathing exercises done?  Sit straight with your back  supported.  Breathe in deeply and slowly through your nose. Your lower rib cage should expand and your abdomen may move forward.  Hold that breath for 3 to 5 seconds.  Breathe out through pursed lips, slowly and completely.  Rest and repeat 10 times every hour while awake.  Rest longer if you become dizzy or lightheaded.        CONTACT SURGEON'S OFFICE IF YOU DEVELOP:  * Fever = 100.4 F   * New respiratory symptoms (e.g. cough, shortness of breath, respiratory distress, sore throat)  * Recent loss of taste or smell  *Flu like symptoms such as headache, fatigue or gastrointestinal symptoms  * You develop any open sores, shingles, burning or painful urination   AND/OR:  * You no longer wish to have the surgery.  * Any other personal circumstances change that may lead to the need to cancel or defer this surgery.  *You were admitted to any hospital within one week of your planned procedure.    SMOKING:  *Quitting smoking can make a huge difference to your health and recovery from surgery.    *If you need help with quitting, call 8-818-QUIT-NOW.    THE DAY OF SURGERY:  *Do not eat any food after midnight the night before your surgery.   *YOU MUST drink 14 OUNCES of clear liquids TWO hours before your instructed ARRIVAL TIME to the hospital. This includes water, black tea/coffee (no milk or cream), apple juice, clear broth and electrolyte drinks (Gatorade).  Please avoid clear liquids that are red in color.   *You may chew gum/mints up to TWO hours before your surgery/procedure.    SURGICAL TIME:  *You will be contacted between 2 p.m. and 6 p.m. the business day before your surgery with your arrival time.  *If you haven't received a call by 6pm, call [phone].  *Scheduled surgery times may change and you will be notified if this occurs-check your personal voicemail for any updates.    ON THE MORNING OF SURGERY:  *Wear comfortable, loose fitting clothing.   *Do not use moisturizers, creams, lotions or perfume.  *All  jewelry and valuables should be left at home.  *Prosthetic devices such as contact lenses, hearing aids, dentures, eyelash extensions, hairpins and body piercing must be removed before surgery.    BRING WITH YOU:  *Photo ID and insurance card  *Current list of medications and allergies  *Pacemaker/Defibrillator/Heart stent cards  *CPAP machine and mask  *Slings/splints/crutches  *Copy of your complete Advanced Directive/DHPOA-if applicable  *Neurostimulator implant remote    PARKING AND ARRIVAL:  *Check in at the Main Entrance desk and let them know you are here for surgery.  *You will be directed to the 2nd floor surgical waiting area.    IF YOU ARE HAVING OUTPATIENT/SAME DAY SURGERY:  *A responsible adult MUST accompany you at the time of discharge and stay with you for 24 hours after your surgery.  *You may NOT drive yourself home after surgery.  *You may use a taxi or ride sharing service ([employer], [employer]) to return home ONLY if you are accompanied by a friend or family member.  *Instructions for resuming your medications will be provided by your surgeon.

## 2025-06-10 ENCOUNTER — ANESTHESIA EVENT (OUTPATIENT)
Dept: OPERATING ROOM | Facility: HOSPITAL | Age: 75
End: 2025-06-10
Payer: MEDICARE

## 2025-06-10 DIAGNOSIS — Z98.890 STATUS POST ARTHROSCOPY OF RIGHT SHOULDER: Primary | ICD-10-CM

## 2025-06-10 NOTE — ANESTHESIA PREPROCEDURE EVALUATION
"Patient: Pauline Orta \"Megan"    Procedure Information       Date/Time: 06/11/25 1255    Procedure: Right Shoulder Arthroscopic Decompression; Debridement; Culture & Biopsy (Right: Shoulder)    Location: [facility] A OR  / [facility] A OR    Surgeons: Emilio Simpson MD            Relevant Problems   Pulmonary   (+) KALLI (obstructive sleep apnea)      Neuro   (+) Anxiety   (+) Bipolar affective disorder, current episode mixed, current episode severity unspecified (Multi)   (+) Cervical radiculopathy      GI   (+) Gastroesophageal reflux disease   (+) Irritable bowel syndrome      Musculoskeletal   (+) Degeneration of lumbar or lumbosacral intervertebral disc   (+) Fibromyalgia   (+) Osteoarthritis   (+) Spinal stenosis of lumbar region   (+) Unilateral primary osteoarthritis of first carpometacarpal joint, right hand       Clinical information reviewed:                   NPO Detail:  No data recorded     Physical Exam    Airway  Mallampati: II     Cardiovascular   Rhythm: regular  Rate: normal     Dental    Pulmonary    Abdominal                                                            Pre-Anesthesia Evaluation                                         Pauline Orta \"Megan" is a 75 y.o. female who presents for the above mentioned procedure due to S/P shoulder replacement, right [Z96.611];Chronic right shoulder pain [M25.511, G89.29]    Medical History[1]  Surgical History[2]  Social History[3]  RX Allergies[4]  Current Medications[5]  Prior to Admission medications    Medication Sig Start Date End Date Taking? Authorizing Provider   ascorbic acid (Vitamin C) 500 mg tablet Take 1 tablet (500 mg) by mouth once daily.    Historical Provider, MD   benzonatate (Tessalon) 200 mg capsule Take 1 capsule (200 mg) by mouth 3 times a day as needed for cough. Do not crush or chew.  Patient not taking: Reported on 6/9/2025 6/2/25   Jaime Smith MD   busPIRone (Buspar) 5 mg tablet TAKE 1 TABLET(5 MG) BY MOUTH TWICE " DAILY AS NEEDED FOR ANXIETY 5/29/25   Jaime Smith MD   calcium citrate-vitamin D2 250 mg-2.5 mcg (100 unit) tablet Take 1 tablet by mouth once daily.    Historical Provider, MD   cholecalciferol (Vitamin D-3) 125 MCG (5000 UT) capsule Take 1 capsule (125 mcg) by mouth once daily.    Historical Provider, MD   codeine-guaifenesin (Robitussin-AC)  mg/5 mL syrup Take 5 mL by mouth 2 times a day as needed for cough for up to 5 days. 6/2/25 6/7/25  Jaime Smith MD   cyanocobalamin (Vitamin B-12) 250 mcg tablet Take 1 tablet (250 mcg) by mouth once daily.    Historical Provider, MD   diclofenac sodium (Voltaren) 1 % gel gel Apply 4.5 inches (4 g) topically 4 times a day as needed. 1/17/23   Historical Provider, MD   estradiol (Estrace) 0.01 % (0.1 mg/gram) vaginal cream Insert 0.25 Applicatorfuls (1 g) into the vagina 2 times a week. INSERT 1 GRAM VAGINALLY 2 TIMES A WEEK  Patient not taking: Reported on 6/9/2025 2/3/25   Jaime Smith MD   gabapentin (Neurontin) 800 mg tablet TAKE 1 TABLET(800 MG) BY MOUTH THREE TIMES DAILY 6/6/25   Yash Grace MD   hydrOXYzine HCL (Atarax) 25 mg tablet Take 1 tablet (25 mg) by mouth 3 times a day.  Patient not taking: Reported on 6/9/2025 2/3/25   Jaime Smith MD   multivitamin tablet Take 1 tablet by mouth once daily.    Historical Provider, MD   omega 3-dha-epa-fish oil (Fish OiL) 1,000 mg (120 mg-180 mg) capsule Take 1 capsule (1,000 mg) by mouth once daily.    Historical Provider, MD   omeprazole (PriLOSEC) 20 mg DR capsule Take 1 capsule (20 mg) by mouth 2 times a day before meals. Do not crush or chew. 2/3/25 8/2/25  Jaime Smith MD   ondansetron ODT (Zofran-ODT) 4 mg disintegrating tablet DISSOLVE ONE TABLET ON/UNDER TONGUE EVERY 8 HOURS AS NEEDED FOR NAUSEA OR VOMITING FOR UP TO 7 DAYS 5/21/25   Jaime Smith MD   polyethylene glycol (Glycolax, Miralax) 17 gram packet Take 17 g by mouth once daily. 2/3/25 2/3/26  Jaime Smith MD   Restasis 0.05 % ophthalmic  "emulsion Administer 1 drop into both eyes 2 times a day as needed (dry eyes). 9/8/23   Historical Provider, MD   rimegepant (Nurtec ODT) 75 mg tablet,disintegrating Dissolve 1 tablet (75 mg) in the mouth every other day.  Patient not taking: Reported on 6/9/2025 6/2/25   Jaime Smith MD   tiZANidine (Zanaflex) 4 mg tablet Take 1 tablet (4 mg) by mouth every 8 hours if needed for muscle spasms. 2/3/25   Jaime Smith MD   topiramate (Topamax) 25 mg tablet Take 1 tablet (25 mg) by mouth once daily. 2/3/25 2/3/26  Jaime Smith MD   TUMERIC-GING-OLIVE-OREG-CAPRYL ORAL Take 1 mg by mouth once daily.    Historical Provider, MD   gabapentin (Neurontin) 800 mg tablet Take 1 tablet (800 mg) by mouth 3 times a day. 1/23/25 6/6/25  Yash Grace MD     No medication comments found.  Visit Vitals  OB Status Hysterectomy   Smoking Status Former                             6/9/2025    10:38 AM 6/2/2025    10:04 AM 5/27/2025     2:42 PM 2/20/2025     2:09 PM 2/18/2025     1:15 PM 1/23/2025     2:59 PM 10/31/2024    12:40 PM   BP REVIEW   BP (ultimate) 137/64 106/62 169/84 110/70 146/98 150/80 114/76     Recent Labs     06/09/25  1105 02/25/25  1405   WBC 6.9 5.7   HGB 12.1 12.3   HCT 37.6 36.9    274   MCV 94 93.7     Recent Labs     06/09/25  1105 02/25/25  1405 10/23/23  1136 10/14/19  0428   EGFR 65 65   < > 52   ANIONGAP 13 7   < > 8   BUN 30* 20   < > 18   CREATININE 0.92 0.91   < > 1.1    142   < > 145   K 4.5 4.2   < > 5.5*    107   < > 112*   CO2 26 28   < > 25   GLUCOSE 80 84   < > 68   CALCIUM 9.1 9.3   < > 8.8   PHOS  --   --   --  3.6    < > = values in this interval not displayed.     Recent Labs     02/25/25  1405 11/07/24  1328   HGBA1C 5.7* 5.7*   TSH 1.81 1.41     Recent Labs     02/25/25  1405 11/07/24  1328   BILITOT 0.3 0.4   PROT 6.8 7.1   ALBUMIN 4.4 4.2   ALKPHOS 67 82   ALT 17 15   AST 20 20     No results for input(s): \"PROTIME\", \"INR\" in the last 79043 hours.  No results found for: " "\"FERRITIN\", \"TIBC\", \"IRONSAT\"  Lab Results   Component Value Date    CARLITA (A) 10/23/2023     Isolated: Methicillin Susceptible Staphylococcus aureus (MSSA)     No results for input(s): \"AMPHETAMINE\", \"MAMPHBLDS\", \"BARBITURATE\", \"BENZODIAZ\", \"BUPRENBLDS\", \"CANNABBLDS\", \"COCBLDS\", \"METHABLDS\", \"OXYBLDS\", \"PCPBLDS\", \"OPIATBLDS\", \"DRBLDCOMM\" in the last 12470 hours.  No results for input(s): \"PHART\", \"AJX8SZL\", \"PO2ART\", \"ZRB0RCJ\", \"D1TPSEBV\", \"BEART\", \"T4EQAWJK\" in the last 33464 hours.      No results found for: \"ABO\"  EKG No results found for this or any previous visit (from the past 4464 hours).  Ejection Fractions:No results found for: \"EF\"  Echocardiogram  Echocardiogram     Narrative  PROCEDURE:         ECHOCARDIOGRAM 2-D M-MODE - [facility]  0010  REASON FOR EXAM: Syncope    RESULT:                        [facility]  Echocardiography Report    Pat.Name:  ROCCO LONDON    Pat.ID:    [id number]  St.Date:   10/14/2019              Refer.MD:  MICHELLE DAMON  Exam Time: 7:47:00 AM              Study Type:Echocardiography  Height:    160cm                   Weight:    64kg  BSA:       1.67 m2                   Age:  1950,69Y  Sex:       FEMALE                  BP:        156/75  Sonogrphr: Vicky Montemayor NOLVIA      Pat. Stat.:Inpatient  Room:      [facility]  Reason for Study:Syncope  Procedures:2D, M-mode, Doppler, Color Flow  Race:      CA    MEASUREMENTS:    MMODE  Left Atrium  LAIDs            3.2 cm  Aorta  Ao Rt            3.7 cm   (2-3.7)  Ratios  LA/Ao          0.865      (0.87-1.1)  Aortic Valve  AV sep           2.2 cm   (1.5-2.6)  Tricuspid Valve  TAPSE           1.75 cm  2D  Left Ventricle  LVEF A4         59.6 %             LVESV A4        20.1 cc  LVEF A2         65.5 %             LVESV BP        18.3 cc  LngAxd A2        1.3 cm            LVESV           51.2 cc  LngAxd A4      0.987 cm  LVIDd           4.64 cm   (3.6-5.2)  LVEDV A2        47.6 cc  LVIDs           3.51 cm   " (2.3-3.9)  LVEDV A4        49.9 cc            LVEF BP         63.1 %  LVEDV BP        49.6 cc            LV EF (Teich)   48.4 %    (55-75)  LVEDV           99.3 cc            LV%fs           24.4 %    (25-46)  LngAxs A2       6.29 cm            LV SV A2        31.2 cc  LngAxs A4       6.47 cm            LV SV A4        29.7 cc  LVESV A2        16.5 cc            LV SV           48.1 cc  Left Atrium  LA a-p           3.3 cm   (2.8-3.4) LA Vol          24.4 cc  Right Atrium  RA As           15.5 cm2  (8.3-19.5) RA Vol          43.5 cc  Ventricular Septum  IVSd            1.12 cm  LVPW  LVPWd          0.867 cm  Aorta  Ao Stj          2.96 cm   (2.3-2.9)  Ao Sin          3.51 cm   (2.5-3.3)  LVOT  LVOT             2.2 cm            LVOTArea         3.8 cm2  Ratios  IVS/LVPW        1.29  Veins  IVC             1.25 cm   (1.1-2.5)  Right Ventricle  Right Ventricle  3.46 cm           Right Ventricle  2.29 cm  Major Axis      6.26 cm  LVAd 2C  LVAd 2C         20.7 cm2  LVAd A4  LVAd A4         21.3 cm2  LVAs 2C  LVAs 2C         10.4 cm2  LVAs A4  LVAs A4         12.1 cm2  LV Biplane  SV              31.3 cc  DOPPLER  AV For Flow/Valve Assess  AV pkVel         105 cm/s (100-170)  AV Forward Flow  AV pkPG            4 mmHg          Area (Kory)      3.52 cm2  (3-5)  MV Forward Flow  MV DeTm          159 ms            MV pkE          63.4 cm/s ()  MV P1/2t          60 ms   (30-60)  MV AP1/2t       3.67 cm2  (4-6)  MV pkA          75.8 cm/s          MV E/A           0.8  PV Forward Flow  PV pkVel        88.2 cm/s (60-90)  PV pkPG            3 mmHg  TV Regurg Flow  TV pkVel         258 cm/s          TV pkPG           27 mmHg  Right Ventricle  Right Ventricle  9.41 cm/s         RVsys P           37 mmHg  LVOT  LVOTmnPG           2 mmHg          LVOTpkPG           4 mmHg  LVOT TVI        20.9 cm            LVOT SV           79 cc  LVOTpkVel       97.3 cm/s ()  Right Atrium  RA Press          10 mmHg  Aortic Valve  Aortic  Valve Ve  0.93  Left Ventricle  LaLat Em        7.94 cm/s          LaLat E/Em         8  LmSep Em        6.73 cm/s          LmSep E/Em       9.4            Findings    Procedural Information: The study quality is technically difficult.    Left Ventricle: Borderline concentric left ventricular hypertrophy is  observed. There is normal left ventricular    systolic function. The  estimated ejection fraction is 55-60%.    Left Atrium:  The left atrial size is upper limits of normal.    Right Ventricle: The right ventricular cavity size is normal. The  right ventricular global systolic function is    normal.    Right Atrium: The right atrial cavity size is normal.    Aortic Valve: Mild aortic cusp sclerosis is present. Systolic  excursion of the aortic valve is normal.    Mitral Valve: Mitral valve leaflet mobility appears normal.    Tricuspid Valve: Tricuspid valve leaflet mobility appears normal.  RVSP is 27 mm Hg+RA pressure.    Pericardium: There is no pericardial effusion.    Aorta:  The aortic root is upper limits of normal in size.      Summary  Normal left ventricular systolic function. Estimated ejection  fraction is about 55-60%. Mild TR.    Signed 10/14/2019 12:20 PM  Dl Abdul MD    Original Interpreting Physician:   DL ABDUL M.D.  Original Transcribed by/Date: JOHN   Oct 14 2019 12:20P  Original Electronically Signed by/Date: DL ABDUL M.D. Oct 14 2019  7:47A    Addendum Interpreting Physician:  Addendum Transcribed by/Date: NO ADDENDUM  Addendum Electronically Signed by/Date:    Stress TestingNo results found for this or any previous visit from the past 42816 days.    Cardiac CatheterizationNo results found for this or any previous visit from the past 06501 days.    Cardiac Scoring No results found for this or any previous visit from the past 43333 days.    AAA screenNo results found for this or any previous visit from the past 18033 days.    Carotid Doppler  Vascular US carotid artery duplex  bilateral     Narrative  PROCEDURE:         DPL CAROTID ARTERIES  - [facility]  2525  REASON FOR EXAM: TIA, R/O CVA    RESULT: DPL CAROTID ARTERIES BI: 10/14/2019 7:39 AM    CLINICAL HISTORY: Dizziness for one day    COMPARISON: 8/24/2014    TECHNIQUE: Carotid Examination using B-mode, color flow and doppler  spectral?analysis    FINDINGS:    RIGHT CAROTID SYSTEM  DCCA PSV/EDV: 45 / 11 cm/sec  ECA PSV:              40 cm/sec  PICA PSV/EDV:    98 / 39 cm/sec  YEFRI PSV/EDV:    137 / 49 cm/sec  DICA PSV/EDV:    64 / 22 cm/sec  BERTA/VCC Ratio:     3.1  VERT :                   ANTEGRADE    LEFT CAROTID SYSTEM  DCCA PSV/EDV:   60 / 16 cm/sec  ECA PSV:               54 cm/sec  PICA PSV/EDV:     87 / 36 cm/sec  YEFRI PSV/EDV:     72 /  31 cm/sec  DICA PSV/EDV:     69 /  25 cm/sec  BERTA/VCC Ratio:       1.4  VERT :                     ANTEGRADE    DUPLEX IMAGES:  Right Carotid System: There is a small amount of plaque within the right  carotid bulb.    Left Carotid System: A small amount of plaque is also visible within the  left carotid bulb..    The estimate of the degree of stenosis included in this report is based on  the NASCET method for calculating stenosis, using the internal carotid  artery distal to the stenosis as the reference point. ?    Impression  Small amount of plaque within left carotid bulb producing less than 50%  stenosis of the left internal carotid artery (level two)    Plaque formation within the right carotid bulb results in a 50-69% stenosis  of the right internal carotid artery (level three)    Antegrade flow within both vertebral arteries.      [id number]    This report has been produced using speech recognition.    Original Interpreting Physician:   ANSHUL BRICEÑO M.D.  Original Transcribed by/Date: PSCB   Oct 14 2019  8:01A  Original Electronically Signed by/Date: ANSHUL BRICEÑO M.D. Oct 14 2019  8:01A    Addendum Interpreting Physician:  Addendum Transcribed by/Date: NO ADDENDUM  Addendum Electronically  "Signed by/Date:    X Ray === 05/27/25 ===    XR CHEST 2 VIEWS    - Impression -  Hyperinflated lungs. No evidence of acute cardiopulmonary process.      MACRO:  None    Signed by: Justin Reid 5/27/2025 3:08 PM  Dictation workstation:   [id number]  Pulmonary Function Tests  No results found for: \"FEV1\", \"FVC\", \"POI4NMB\", \"TLC\", \"DLCO\"   OTHER: No results found for this or any previous visit from the past 1825 days.        No results found for: \"PREGTESTUR\", \"PREGSERUM\", \"HCG\", \"HCGQUANT\"  The 10-year ASCVD risk score (Tulio REYNOLDS, et al., 2019) is: 22.4%    Values used to calculate the score:      Age: 75 years      Sex: Female      Is Non- : No      Diabetic: No      Tobacco smoker: No      Systolic Blood Pressure: 137 mmHg      Is BP treated: No      HDL Cholesterol: 61.8 mg/dL      Total Cholesterol: 217 mg/dL    Code Status: Prior                    Anesthesia Plan    History of general anesthesia?: yes  History of complications of general anesthesia?: no    ASA 3     general and regional     intravenous induction   Anesthetic plan and risks discussed with patient.    Plan discussed with CRNA and CAA.           [1]   Past Medical History:  Diagnosis Date    Anxiety     Arthritis     Cervical disc disease     Chronic pain disorder     CTS (carpal tunnel syndrome) 2022    Fibromyalgia, primary     Fracture lumbar vertebra-closed (Multi) 1986    Fracture, foot 2024    GERD (gastroesophageal reflux disease)     Glenohumeral arthritis, right     Irritable bowel syndrome     Lumbosacral disc disease 1995    Migraine     Osteoarthritis     Osteopenia     Peripheral neuropathy     Rotator cuff syndrome 2024    Sleep apnea     Spinal stenosis 1990    Thoracic disc disease 1960&2024    Trigger finger 3/01/22    Unintended awareness under general anesthesia during procedure    [2]   Past Surgical History:  Procedure Laterality Date    BLADDER SUSPENSION      CARPAL TUNNEL RELEASE Right 4/8/24    " CATARACT EXTRACTION      CERVICAL FUSION      COLONOSCOPY      ELBOW SURGERY  73    GASTRIC BYPASS      HYSTERECTOMY      partial    LAMINECTOMY      MR HEAD ANGIO WO IV CONTRAST  10/14/2019    MR HEAD ANGIO WO IV CONTRAST [facility]    SPINAL FUSION   &     TOTAL SHOULDER ARTHROPLASTY Right     TRIGGER FINGER RELEASE Right 24   [3]   Social History  Tobacco Use    Smoking status: Former     Current packs/day: 0.00     Average packs/day: 0.3 packs/day for 10.0 years (2.5 ttl pk-yrs)     Types: Cigarettes     Start date: 3/18/1980     Quit date: 3/18/1990     Years since quittin.2    Smokeless tobacco: Never   Vaping Use    Vaping status: Never Used   Substance Use Topics    Alcohol use: Not Currently    Drug use: Never   [4]   Allergies  Allergen Reactions    Aspirin Other     hx gastric bypass    Iodinated Contrast Media Hives     Patient states she gets benadryl prior to any scans using IV contrast    Lithium Unknown     Muscle twitch occurred over 30 years ago    Shellfish Derived Angioedema     35 years ago    Adhesive Other     Redness - Irritation    Caffeine Insomnia   [5] No current facility-administered medications for this encounter.    Current Outpatient Medications:     ascorbic acid (Vitamin C) 500 mg tablet, Take 1 tablet (500 mg) by mouth once daily., Disp: , Rfl:     benzonatate (Tessalon) 200 mg capsule, Take 1 capsule (200 mg) by mouth 3 times a day as needed for cough. Do not crush or chew. (Patient not taking: Reported on 2025), Disp: 30 capsule, Rfl: 1    busPIRone (Buspar) 5 mg tablet, TAKE 1 TABLET(5 MG) BY MOUTH TWICE DAILY AS NEEDED FOR ANXIETY, Disp: 60 tablet, Rfl: 1    calcium citrate-vitamin D2 250 mg-2.5 mcg (100 unit) tablet, Take 1 tablet by mouth once daily., Disp: , Rfl:     cholecalciferol (Vitamin D-3) 125 MCG (5000 UT) capsule, Take 1 capsule (125 mcg) by mouth once daily., Disp: , Rfl:     cyanocobalamin (Vitamin B-12) 250 mcg tablet,  Take 1 tablet (250 mcg) by mouth once daily., Disp: , Rfl:     diclofenac sodium (Voltaren) 1 % gel gel, Apply 4.5 inches (4 g) topically 4 times a day as needed., Disp: , Rfl:     estradiol (Estrace) 0.01 % (0.1 mg/gram) vaginal cream, Insert 0.25 Applicatorfuls (1 g) into the vagina 2 times a week. INSERT 1 GRAM VAGINALLY 2 TIMES A WEEK (Patient not taking: Reported on 6/9/2025), Disp: 42.5 g, Rfl: 1    gabapentin (Neurontin) 800 mg tablet, TAKE 1 TABLET(800 MG) BY MOUTH THREE TIMES DAILY, Disp: 90 tablet, Rfl: 3    hydrOXYzine HCL (Atarax) 25 mg tablet, Take 1 tablet (25 mg) by mouth 3 times a day. (Patient not taking: Reported on 6/9/2025), Disp: 90 tablet, Rfl: 1    multivitamin tablet, Take 1 tablet by mouth once daily., Disp: , Rfl:     omega 3-dha-epa-fish oil (Fish OiL) 1,000 mg (120 mg-180 mg) capsule, Take 1 capsule (1,000 mg) by mouth once daily., Disp: , Rfl:     omeprazole (PriLOSEC) 20 mg DR capsule, Take 1 capsule (20 mg) by mouth 2 times a day before meals. Do not crush or chew., Disp: 180 capsule, Rfl: 1    ondansetron ODT (Zofran-ODT) 4 mg disintegrating tablet, DISSOLVE ONE TABLET ON/UNDER TONGUE EVERY 8 HOURS AS NEEDED FOR NAUSEA OR VOMITING FOR UP TO 7 DAYS, Disp: 20 tablet, Rfl: 0    polyethylene glycol (Glycolax, Miralax) 17 gram packet, Take 17 g by mouth once daily., Disp: 90 packet, Rfl: 1    Restasis 0.05 % ophthalmic emulsion, Administer 1 drop into both eyes 2 times a day as needed (dry eyes)., Disp: , Rfl:     rimegepant (Nurtec ODT) 75 mg tablet,disintegrating, Dissolve 1 tablet (75 mg) in the mouth every other day. (Patient not taking: Reported on 6/9/2025), Disp: 27 tablet, Rfl: 1    tiZANidine (Zanaflex) 4 mg tablet, Take 1 tablet (4 mg) by mouth every 8 hours if needed for muscle spasms., Disp: 90 tablet, Rfl: 2    topiramate (Topamax) 25 mg tablet, Take 1 tablet (25 mg) by mouth once daily., Disp: 90 tablet, Rfl: 1    TUMERIC-GING-OLIVE-OREG-CAPRYL ORAL, Take 1 mg by mouth once  daily., Disp: , Rfl:

## 2025-06-11 ENCOUNTER — HOSPITAL ENCOUNTER (OUTPATIENT)
Facility: HOSPITAL | Age: 75
Setting detail: OUTPATIENT SURGERY
Discharge: HOME | End: 2025-06-11
Attending: ORTHOPAEDIC SURGERY | Admitting: ORTHOPAEDIC SURGERY
Payer: MEDICARE

## 2025-06-11 ENCOUNTER — PHARMACY VISIT (OUTPATIENT)
Dept: PHARMACY | Facility: CLINIC | Age: 75
End: 2025-06-11
Payer: MEDICARE

## 2025-06-11 ENCOUNTER — ANESTHESIA (OUTPATIENT)
Dept: OPERATING ROOM | Facility: HOSPITAL | Age: 75
End: 2025-06-11
Payer: MEDICARE

## 2025-06-11 VITALS
SYSTOLIC BLOOD PRESSURE: 126 MMHG | TEMPERATURE: 97.2 F | HEART RATE: 89 BPM | DIASTOLIC BLOOD PRESSURE: 70 MMHG | HEIGHT: 63 IN | OXYGEN SATURATION: 94 % | RESPIRATION RATE: 16 BRPM | BODY MASS INDEX: 24.65 KG/M2 | WEIGHT: 139.11 LBS

## 2025-06-11 DIAGNOSIS — M25.511 CHRONIC RIGHT SHOULDER PAIN: ICD-10-CM

## 2025-06-11 DIAGNOSIS — G89.18 ACUTE POST-OPERATIVE PAIN: Primary | ICD-10-CM

## 2025-06-11 DIAGNOSIS — G89.29 CHRONIC RIGHT SHOULDER PAIN: ICD-10-CM

## 2025-06-11 DIAGNOSIS — Z96.611 S/P SHOULDER REPLACEMENT, RIGHT: ICD-10-CM

## 2025-06-11 PROCEDURE — 7100000009 HC PHASE TWO TIME - INITIAL BASE CHARGE: Performed by: ORTHOPAEDIC SURGERY

## 2025-06-11 PROCEDURE — 2500000005 HC RX 250 GENERAL PHARMACY W/O HCPCS: Performed by: ANESTHESIOLOGY

## 2025-06-11 PROCEDURE — 2500000004 HC RX 250 GENERAL PHARMACY W/ HCPCS (ALT 636 FOR OP/ED): Performed by: ANESTHESIOLOGY

## 2025-06-11 PROCEDURE — 2720000007 HC OR 272 NO HCPCS: Performed by: ORTHOPAEDIC SURGERY

## 2025-06-11 PROCEDURE — 2500000005 HC RX 250 GENERAL PHARMACY W/O HCPCS: Performed by: STUDENT IN AN ORGANIZED HEALTH CARE EDUCATION/TRAINING PROGRAM

## 2025-06-11 PROCEDURE — 7100000002 HC RECOVERY ROOM TIME - EACH INCREMENTAL 1 MINUTE: Performed by: ORTHOPAEDIC SURGERY

## 2025-06-11 PROCEDURE — 87070 CULTURE OTHR SPECIMN AEROBIC: CPT | Mod: AHULAB | Performed by: NURSE PRACTITIONER

## 2025-06-11 PROCEDURE — 3600000004 HC OR TIME - INITIAL BASE CHARGE - PROCEDURE LEVEL FOUR: Performed by: ORTHOPAEDIC SURGERY

## 2025-06-11 PROCEDURE — 3600000009 HC OR TIME - EACH INCREMENTAL 1 MINUTE - PROCEDURE LEVEL FOUR: Performed by: ORTHOPAEDIC SURGERY

## 2025-06-11 PROCEDURE — 3700000001 HC GENERAL ANESTHESIA TIME - INITIAL BASE CHARGE: Performed by: ORTHOPAEDIC SURGERY

## 2025-06-11 PROCEDURE — 2500000004 HC RX 250 GENERAL PHARMACY W/ HCPCS (ALT 636 FOR OP/ED): Performed by: ORTHOPAEDIC SURGERY

## 2025-06-11 PROCEDURE — A29822 PR SHLDR ARTHROSCOP,PART DEBRIDE

## 2025-06-11 PROCEDURE — RXMED WILLOW AMBULATORY MEDICATION CHARGE

## 2025-06-11 PROCEDURE — 2500000004 HC RX 250 GENERAL PHARMACY W/ HCPCS (ALT 636 FOR OP/ED)

## 2025-06-11 PROCEDURE — 7100000010 HC PHASE TWO TIME - EACH INCREMENTAL 1 MINUTE: Performed by: ORTHOPAEDIC SURGERY

## 2025-06-11 PROCEDURE — 3700000002 HC GENERAL ANESTHESIA TIME - EACH INCREMENTAL 1 MINUTE: Performed by: ORTHOPAEDIC SURGERY

## 2025-06-11 PROCEDURE — 99100 ANES PT EXTEME AGE<1 YR&>70: CPT | Performed by: ANESTHESIOLOGY

## 2025-06-11 PROCEDURE — 29805 SHO ARTHRS DX +- SYNOVIAL BX: CPT | Performed by: ORTHOPAEDIC SURGERY

## 2025-06-11 PROCEDURE — 2500000005 HC RX 250 GENERAL PHARMACY W/O HCPCS

## 2025-06-11 PROCEDURE — A29822 PR SHLDR ARTHROSCOP,PART DEBRIDE: Performed by: ANESTHESIOLOGY

## 2025-06-11 PROCEDURE — 7100000001 HC RECOVERY ROOM TIME - INITIAL BASE CHARGE: Performed by: ORTHOPAEDIC SURGERY

## 2025-06-11 PROCEDURE — 2500000001 HC RX 250 WO HCPCS SELF ADMINISTERED DRUGS (ALT 637 FOR MEDICARE OP): Performed by: ANESTHESIOLOGY

## 2025-06-11 PROCEDURE — 64415 NJX AA&/STRD BRCH PLXS IMG: CPT | Performed by: ANESTHESIOLOGY

## 2025-06-11 RX ORDER — LIDOCAINE HYDROCHLORIDE 10 MG/ML
0.1 INJECTION, SOLUTION EPIDURAL; INFILTRATION; INTRACAUDAL; PERINEURAL ONCE
Status: DISCONTINUED | OUTPATIENT
Start: 2025-06-11 | End: 2025-06-11 | Stop reason: HOSPADM

## 2025-06-11 RX ORDER — SODIUM CHLORIDE, SODIUM LACTATE, POTASSIUM CHLORIDE, CALCIUM CHLORIDE 600; 310; 30; 20 MG/100ML; MG/100ML; MG/100ML; MG/100ML
100 INJECTION, SOLUTION INTRAVENOUS CONTINUOUS
Status: DISCONTINUED | OUTPATIENT
Start: 2025-06-11 | End: 2025-06-11 | Stop reason: HOSPADM

## 2025-06-11 RX ORDER — DOCUSATE SODIUM 100 MG/1
100 CAPSULE, LIQUID FILLED ORAL 2 TIMES DAILY
Qty: 20 CAPSULE | Refills: 0 | Status: SHIPPED | OUTPATIENT
Start: 2025-06-11 | End: 2025-06-21

## 2025-06-11 RX ORDER — IPRATROPIUM BROMIDE 0.5 MG/2.5ML
500 SOLUTION RESPIRATORY (INHALATION) ONCE
Status: DISCONTINUED | OUTPATIENT
Start: 2025-06-11 | End: 2025-06-11 | Stop reason: HOSPADM

## 2025-06-11 RX ORDER — SODIUM CHLORIDE, SODIUM LACTATE, POTASSIUM CHLORIDE, CALCIUM CHLORIDE 600; 310; 30; 20 MG/100ML; MG/100ML; MG/100ML; MG/100ML
INJECTION, SOLUTION INTRAVENOUS CONTINUOUS PRN
Status: DISCONTINUED | OUTPATIENT
Start: 2025-06-11 | End: 2025-06-11

## 2025-06-11 RX ORDER — ACETAMINOPHEN 325 MG/1
650 TABLET ORAL EVERY 4 HOURS PRN
Status: DISCONTINUED | OUTPATIENT
Start: 2025-06-11 | End: 2025-06-11 | Stop reason: HOSPADM

## 2025-06-11 RX ORDER — LIDOCAINE HYDROCHLORIDE 20 MG/ML
INJECTION, SOLUTION EPIDURAL; INFILTRATION; INTRACAUDAL; PERINEURAL AS NEEDED
Status: DISCONTINUED | OUTPATIENT
Start: 2025-06-11 | End: 2025-06-11

## 2025-06-11 RX ORDER — DROPERIDOL 2.5 MG/ML
0.62 INJECTION, SOLUTION INTRAMUSCULAR; INTRAVENOUS ONCE AS NEEDED
Status: COMPLETED | OUTPATIENT
Start: 2025-06-11 | End: 2025-06-11

## 2025-06-11 RX ORDER — DEXMEDETOMIDINE IN 0.9 % NACL 20 MCG/5ML
SYRINGE (ML) INTRAVENOUS
Status: COMPLETED | OUTPATIENT
Start: 2025-06-11 | End: 2025-06-11

## 2025-06-11 RX ORDER — ROCURONIUM BROMIDE 10 MG/ML
INJECTION, SOLUTION INTRAVENOUS AS NEEDED
Status: DISCONTINUED | OUTPATIENT
Start: 2025-06-11 | End: 2025-06-11

## 2025-06-11 RX ORDER — BUPIVACAINE HCL/EPINEPHRINE 0.5-1:200K
VIAL (ML) INJECTION
Status: COMPLETED | OUTPATIENT
Start: 2025-06-11 | End: 2025-06-11

## 2025-06-11 RX ORDER — CEFAZOLIN 1 G/1
INJECTION, POWDER, FOR SOLUTION INTRAVENOUS AS NEEDED
Status: DISCONTINUED | OUTPATIENT
Start: 2025-06-11 | End: 2025-06-11

## 2025-06-11 RX ORDER — OXYCODONE AND ACETAMINOPHEN 5; 325 MG/1; MG/1
1 TABLET ORAL EVERY 6 HOURS PRN
Qty: 24 TABLET | Refills: 0 | Status: SHIPPED | OUTPATIENT
Start: 2025-06-11 | End: 2025-06-18

## 2025-06-11 RX ORDER — OXYCODONE HYDROCHLORIDE 5 MG/1
5 TABLET ORAL EVERY 4 HOURS PRN
Status: DISCONTINUED | OUTPATIENT
Start: 2025-06-11 | End: 2025-06-11 | Stop reason: HOSPADM

## 2025-06-11 RX ORDER — ONDANSETRON HYDROCHLORIDE 2 MG/ML
INJECTION, SOLUTION INTRAVENOUS AS NEEDED
Status: DISCONTINUED | OUTPATIENT
Start: 2025-06-11 | End: 2025-06-11

## 2025-06-11 RX ORDER — MIDAZOLAM HYDROCHLORIDE 1 MG/ML
INJECTION, SOLUTION INTRAMUSCULAR; INTRAVENOUS
Status: COMPLETED | OUTPATIENT
Start: 2025-06-11 | End: 2025-06-11

## 2025-06-11 RX ORDER — PROPOFOL 10 MG/ML
INJECTION, EMULSION INTRAVENOUS AS NEEDED
Status: DISCONTINUED | OUTPATIENT
Start: 2025-06-11 | End: 2025-06-11

## 2025-06-11 RX ORDER — ONDANSETRON HYDROCHLORIDE 2 MG/ML
4 INJECTION, SOLUTION INTRAVENOUS ONCE AS NEEDED
Status: COMPLETED | OUTPATIENT
Start: 2025-06-11 | End: 2025-06-11

## 2025-06-11 RX ORDER — ALBUTEROL SULFATE 0.83 MG/ML
2.5 SOLUTION RESPIRATORY (INHALATION) ONCE AS NEEDED
Status: DISCONTINUED | OUTPATIENT
Start: 2025-06-11 | End: 2025-06-11 | Stop reason: HOSPADM

## 2025-06-11 RX ORDER — SODIUM CHLORIDE, SODIUM LACTATE, POTASSIUM CHLORIDE, AND CALCIUM CHLORIDE .6; .31; .03; .02 G/100ML; G/100ML; G/100ML; G/100ML
IRRIGANT IRRIGATION AS NEEDED
Status: DISCONTINUED | OUTPATIENT
Start: 2025-06-11 | End: 2025-06-11 | Stop reason: HOSPADM

## 2025-06-11 RX ORDER — NORETHINDRONE AND ETHINYL ESTRADIOL 0.5-0.035
KIT ORAL AS NEEDED
Status: DISCONTINUED | OUTPATIENT
Start: 2025-06-11 | End: 2025-06-11

## 2025-06-11 RX ORDER — PHENYLEPHRINE HCL IN 0.9% NACL 1 MG/10 ML
SYRINGE (ML) INTRAVENOUS AS NEEDED
Status: DISCONTINUED | OUTPATIENT
Start: 2025-06-11 | End: 2025-06-11

## 2025-06-11 RX ADMIN — CEFAZOLIN 2 G: 1 INJECTION, POWDER, FOR SOLUTION INTRAMUSCULAR; INTRAVENOUS at 13:59

## 2025-06-11 RX ADMIN — DEXAMETHASONE SODIUM PHOSPHATE 2 MG: 4 INJECTION, SOLUTION INTRA-ARTICULAR; INTRALESIONAL; INTRAMUSCULAR; INTRAVENOUS; SOFT TISSUE at 13:01

## 2025-06-11 RX ADMIN — Medication 20 MCG: at 13:01

## 2025-06-11 RX ADMIN — EPHEDRINE SULFATE 15 MG: 50 INJECTION, SOLUTION INTRAVENOUS at 14:41

## 2025-06-11 RX ADMIN — OXYCODONE HYDROCHLORIDE 5 MG: 5 TABLET ORAL at 16:21

## 2025-06-11 RX ADMIN — SODIUM CHLORIDE, SODIUM LACTATE, POTASSIUM CHLORIDE, AND CALCIUM CHLORIDE: .6; .31; .03; .02 INJECTION, SOLUTION INTRAVENOUS at 13:51

## 2025-06-11 RX ADMIN — DROPERIDOL 0.62 MG: 2.5 INJECTION, SOLUTION INTRAMUSCULAR; INTRAVENOUS at 15:52

## 2025-06-11 RX ADMIN — POVIDONE-IODINE 1 APPLICATION: 5 SOLUTION TOPICAL at 11:58

## 2025-06-11 RX ADMIN — EPHEDRINE SULFATE 10 MG: 50 INJECTION, SOLUTION INTRAVENOUS at 14:21

## 2025-06-11 RX ADMIN — ONDANSETRON 4 MG: 2 INJECTION, SOLUTION INTRAMUSCULAR; INTRAVENOUS at 14:57

## 2025-06-11 RX ADMIN — ROCURONIUM 70 MG: 100 INJECTION, SOLUTION INTRAVENOUS at 13:57

## 2025-06-11 RX ADMIN — SUGAMMADEX 200 MG: 100 INJECTION, SOLUTION INTRAVENOUS at 15:02

## 2025-06-11 RX ADMIN — ONDANSETRON 4 MG: 2 INJECTION, SOLUTION INTRAMUSCULAR; INTRAVENOUS at 15:20

## 2025-06-11 RX ADMIN — EPHEDRINE SULFATE 10 MG: 50 INJECTION, SOLUTION INTRAVENOUS at 14:33

## 2025-06-11 RX ADMIN — MIDAZOLAM HYDROCHLORIDE 2 MG: 1 INJECTION, SOLUTION INTRAMUSCULAR; INTRAVENOUS at 13:01

## 2025-06-11 RX ADMIN — LIDOCAINE HYDROCHLORIDE 100 MG: 20 INJECTION, SOLUTION EPIDURAL; INFILTRATION; INTRACAUDAL; PERINEURAL at 13:57

## 2025-06-11 RX ADMIN — Medication 15 ML: at 13:01

## 2025-06-11 RX ADMIN — Medication 200 MCG: at 14:09

## 2025-06-11 RX ADMIN — PROPOFOL 100 MG: 10 INJECTION, EMULSION INTRAVENOUS at 13:57

## 2025-06-11 RX ADMIN — Medication 200 MCG: at 14:23

## 2025-06-11 RX ADMIN — Medication 200 MCG: at 14:12

## 2025-06-11 RX ADMIN — CARBOXYMETHYLCELLULOSE SODIUM 2 DROP: 5 SOLUTION/ DROPS OPHTHALMIC at 14:00

## 2025-06-11 ASSESSMENT — PAIN - FUNCTIONAL ASSESSMENT
PAIN_FUNCTIONAL_ASSESSMENT: 0-10

## 2025-06-11 ASSESSMENT — PAIN SCALES - GENERAL
PAINLEVEL_OUTOF10: 4
PAINLEVEL_OUTOF10: 6
PAINLEVEL_OUTOF10: 6
PAINLEVEL_OUTOF10: 4
PAINLEVEL_OUTOF10: 6

## 2025-06-11 ASSESSMENT — PAIN DESCRIPTION - DESCRIPTORS: DESCRIPTORS: ACHING

## 2025-06-11 ASSESSMENT — COLUMBIA-SUICIDE SEVERITY RATING SCALE - C-SSRS
2. HAVE YOU ACTUALLY HAD ANY THOUGHTS OF KILLING YOURSELF?: NO
6. HAVE YOU EVER DONE ANYTHING, STARTED TO DO ANYTHING, OR PREPARED TO DO ANYTHING TO END YOUR LIFE?: NO
1. IN THE PAST MONTH, HAVE YOU WISHED YOU WERE DEAD OR WISHED YOU COULD GO TO SLEEP AND NOT WAKE UP?: NO

## 2025-06-11 NOTE — H&P
[facility] Department of Orthopaedic Surgery   Surgical History & Physical >30 Days    Reason for Surgery: Painful right rTSA  Planned Procedure: R shoulder arthroscopy, debridement, decompression, biopsy, culture    History & Physical Reviewed:  Pauline Orta is a 75 y.o. female presenting with the above symptoms. This patient was evaluated as an outpatient, and a plan was made for operative management. Risks and benefits were discussed, and the patient and/or caregivers elected to proceed. The patient presents for the above listed procedure today.     Home medications were reviewed with significant updates noted below:  No significant changes.    Allergies:  Allergies[1]    Review of Systems:  12 point review of systems reviewed and neative     Physical Exam:   · Physical Exam:  - Constitutional: No acute distress, cooperative  - Eyes: EOM grossly intact  - Head/Neck: Trachea midline  - Respiratory/Thorax: Normal work of breathing  - Gastrointestinal: Non tender  - Psychological: Appropriate mood/behavior  - Skin: Warm and dry  - Musculoskeletal: moves extremities    ERAS patient?: No    COVID-19 Risk Consent:   Surgeon has reviewed the key risks related to vernon COVID-19 and subsequent sequelae.       06/11/25 at 12:34 PM - Johnny Yee MD          [1]   Allergies  Allergen Reactions    Aspirin Other     hx gastric bypass; states history of stomach ulcer after taking aspirin    Iodinated Contrast Media Hives     Patient states she gets benadryl prior to any scans using IV contrast    Lithium Unknown     Muscle twitch occurred over 30 years ago    Shellfish Derived Angioedema     35 years ago    Adhesive Other     Redness - Irritation; does ok with IV tegaderm and paper tape and tele stickers    Caffeine Insomnia    House Dust Cough    Monosodium Glutamate Other and Headache     Pt states it just did not make her feel right after, stated felt squirrely

## 2025-06-11 NOTE — ANESTHESIA PROCEDURE NOTES
Airway  Date/Time: 6/11/2025 2:00 PM  Reason: elective    Airway not difficult    Staffing  Performed: DONOVAN   Authorized by: Merrick Calvillo MD    Performed by: DONOVAN Jaffe  Patient location during procedure: OR    Patient Condition  Indications for airway management: anesthesia  Patient position: sniffing  No planned trial extubation  Sedation level: deep     Final Airway Details   Preoxygenated: yes  Final airway type: endotracheal airway  Successful airway: ETT  Cuffed: yes   Successful intubation technique: direct laryngoscopy  Adjuncts used in placement: intubating stylet  Blade: Beatris  Blade size: #3  ETT size (mm): 7.0  Cormack-Lehane Classification: grade I - full view of glottis  Placement verified by: chest auscultation and capnometry   Cuff volume (mL): 7  Measured from: lips  ETT to lips (cm): 20  Number of attempts at approach: 1

## 2025-06-11 NOTE — OP NOTE
"Right Shoulder Arthroscopic Decompression; Debridement; Culture & Biopsy (R) Operative Note     Date: 2025  OR Location: [facility] A OR    Name: Pauline Orta \"Megan", : 1950, Age: 75 y.o., MRN: [id number], Sex: female    Diagnosis  Pre-op Diagnosis      * S/P shoulder replacement, right [Z96.611]     * Chronic right shoulder pain [M25.511, G89.29] Post-op Diagnosis     * S/P shoulder replacement, right [Z96.611]     * Chronic right shoulder pain [M25.511, G89.29]     Procedures  Right Shoulder Arthroscopic Decompression; Debridement; Culture & Biopsy  67530 - [place] DIAGNOSTIC ARTHROSCOPY SHOULDER +- SYNOVIAL BX      Surgeons      * Emilio Simpson - Primary    Resident/Fellow/Other Assistant:  Surgeons and Role:  * No surgeons found with a matching role *    Staff:   Circulator: Ghada  Scrub Person: Nancy    Anesthesia Staff: Anesthesiologist: Merrick Calvillo MD  C-AA: DONOVAN Jaffe    Procedure Summary  Anesthesia: Regional, General  ASA: III  Estimated Blood Loss: 10mL  Intra-op Medications:   Administrations occurring from 1255 to 1505 on 25:   Medication Name Total Dose   lactated Ringer's irrigation solution 6,000 mL   bupivacaine 0.5%-EPINEPHrine (Marcaine w/ EPI) injection 15 mL   dexAMETHasone (Decadron) 4 mg/mL IV Syringe 2 mL 2 mg   dexMEDETOMidine 4 mcg/mL in NS syringe 20 mcg   midazolam (Versed) injection 1 mg/mL 2 mg   ceFAZolin (Ancef) vial 1 g 2 g   dexAMETHasone (Decadron) 4 mg/mL IV Syringe 2 mL 8 mg   ePHEDrine injection 35 mg   LR infusion Cannot be calculated   lidocaine PF (Xylocaine-MPF) local injection 2 % 100 mg   lubricating eye drops ophthalmic solution 2 drop   ondansetron (Zofran) 2 mg/mL injection 4 mg   phenylephrine 100 mcg/mL syringe 10 mL (prefilled) 600 mcg   propofol (Diprivan) injection 10 mg/mL 100 mg   rocuronium (ZeMuron) 50 mg/5 mL injection 70 mg   sugammadex (Bridion) 200 mg/2 mL injection 200 mg              Anesthesia Record           " "    Intraprocedure I/O Totals          Intake    LR infusion 200.00 mL    Total Intake 200 mL          Specimen:   ID Type Source Tests Collected by Time   A : RIGHT SHOULDER #1 Swab SHOULDER ARTHROPLASTY RIGHT TISSUE/WOUND CULTURE/SMEAR Emilio Simpson MD 6/11/2025 1425   B : RIGHT SHOULDER #2 Swab SHOULDER ARTHROPLASTY RIGHT TISSUE/WOUND CULTURE/SMEAR Emilio Simpson MD 6/11/2025 1425   C : RIGHT SHOULDER #3 Swab SHOULDER ARTHROPLASTY RIGHT TISSUE/WOUND CULTURE/SMEAR Emilio Simpson MD 6/11/2025 1425   D : RIGHT SHOULDER #4 Swab SHOULDER ARTHROPLASTY RIGHT TISSUE/WOUND CULTURE/SMEAR Emilio Simpson MD 6/11/2025 1426   E : RIGHT SHOULDER #5 Swab SHOULDER ARTHROPLASTY RIGHT TISSUE/WOUND CULTURE/SMEAR Emilio Simpson MD 6/11/2025 1426   F : RIGHT SHOULDER #6 Swab SHOULDER ARTHROPLASTY RIGHT TISSUE/WOUND CULTURE/SMEAR Emilio Simpson MD 6/11/2025 1426                 Drains and/or Catheters: * None in log *    Tourniquet Times:         Implants:     Findings: Consistent with diagnosis significant scar tissue in the subacromial space and posteriorly in the subdeltoid region around the rotator cuff intact and well-fixed implants no signs of any loosening    Indications: Pauline Orta \"Megan" is an 75 y.o. female who is having surgery for S/P shoulder replacement, right [Z96.611]  Chronic right shoulder pain [M25.511, G89.29].  Subacromial bursitis    The patient was seen in the preoperative area. The risks, benefits, complications, treatment options, non-operative alternatives, expected recovery and outcomes were discussed with the patient. The possibilities of reaction to medication, pulmonary aspiration, injury to surrounding structures, bleeding, recurrent infection, the need for additional procedures, failure to diagnose a condition, and creating a complication requiring transfusion or operation were discussed with the patient. The patient concurred with the proposed plan, giving " informed consent.  The site of surgery was properly noted/marked if necessary per policy. The patient has been actively warmed in preoperative area. Preoperative antibiotics have been ordered and given within 1 hours of incision. Venous thrombosis prophylaxis have been ordered including bilateral sequential compression devices    Procedure Details: History: This is a very pleasant patient who unfortunately had pain after shoulder replacement.  Inflammatory markers and CT scan did not show obvious signs of reasons for the pain.  Risk benefits alternatives of the debridement, culture and biopsy were discussed at length with the patient they understood they wished to proceed    Operative report: On the day of surgery the patient was seen in the preop holding area identifies correct patient shoulder was identified and marked as the correct operative site.  Interscalene nerve block was performed.  Taken the operating room.  We performed a huddle.  Confirmed the correct patient and the correct operative site.  Sedated intubated by anesthesia.  60 degree beachchair position was used for positioning.  All bony prominences were well-padded.  Head was held in neutral position.  We prepped and draped the patient in standard fashion.  Pause to ensure that this was a correct patient.  We then made a standard posterior portal entered the subacromial space.  There we did an extensive debridement of the rotator cuff, bone and cartilage.   We obtained 6 cultures.  We did decompression removing all the scar tissue on the undersurface of the acromion.  We took the shoulder through its full range of motion.  No signs of any loosening or instability.  Closed in standard fashion with nylon suture.  Evidence of Infection: No   Complications:  None; patient tolerated the procedure well.    Disposition: PACU - hemodynamically stable.  Condition: stable                 Additional Details: None    Attending Attestation: I was present and  scrubbed for the key portions of the procedure.    Emilio Simpson  Phone Number: [phone]

## 2025-06-11 NOTE — ANESTHESIA POSTPROCEDURE EVALUATION
"Patient: Pauline Orta \"Megan"    Procedure Summary       Date: 06/11/25 Room / Location: [facility] OR 12 / [facility] OR    Anesthesia Start: 1351 Anesthesia Stop: 1514    Procedure: Right Shoulder Arthroscopic Decompression; Debridement; Culture & Biopsy (Right: Shoulder) Diagnosis:       S/P shoulder replacement, right      Chronic right shoulder pain      (S/P shoulder replacement, right [Z96.611])      (Chronic right shoulder pain [M25.511, G89.29])    Surgeons: Emilio Simpson MD Responsible Provider: Merrick Calvillo MD    Anesthesia Type: general, regional ASA Status: 3            Anesthesia Type: general, regional    Vitals Value Taken Time   /72 06/11/25 15:09   Temp 36.2 °C (97.2 °F) 06/11/25 15:09   Pulse 95 06/11/25 15:09   Resp 16 06/11/25 15:09   SpO2 96 % 06/11/25 15:09       Anesthesia Post Evaluation    Patient location during evaluation: bedside  Patient participation: complete - patient participated  Level of consciousness: awake  Pain management: adequate  Airway patency: patent  Cardiovascular status: acceptable  Respiratory status: acceptable  Hydration status: acceptable  Postoperative Nausea and Vomiting: none        No notable events documented.    "

## 2025-06-11 NOTE — ANESTHESIA PROCEDURE NOTES
Peripheral Block    Patient location during procedure: pre-op  Medication administered at: 6/11/2025 1:01 PM  End time: 6/11/2025 1:10 PM  Reason for block: procedure for pain, at surgeon's request and post-op pain management  Staffing  Performed: attending   Authorized by: Merrick Calvillo MD    Performed by: Merrick Calvillo MD  Preanesthetic Checklist  Completed: patient identified, IV checked, site marked, risks and benefits discussed, surgical consent, monitors and equipment checked, pre-op evaluation and timeout performed   Timeout performed at: 6/11/2025 1:01 PM  Peripheral Block  Patient position: sitting  Prep: alcohol swabs  Patient monitoring: continuous pulse ox  Block type: interscalene  Laterality: right  Injection technique: single-shot  Guidance: ultrasound guided  Local infiltration: lidocaine  Needle  Needle type: short-bevel   Needle gauge: 22 G  Needle length: 5 cm  Needle localization: ultrasound guidance     image stored in chart  Test dose: negative  Assessment  Injection assessment: negative aspiration for heme, no paresthesia on injection, incremental injection and local visualized surrounding nerve on ultrasound  Heart rate change: no  Slow fractionated injection: yes  Medications Administered  dexAMETHasone (Decadron) injection - perineural injection   2 mg - 6/11/2025 1:01:00 PM  midazolam (VERSED) IV - intravenous   2 mg - 6/11/2025 1:01:00 PM  dexmedeTOMIDine (Precedex) 4 mcg/mL in 0.9 % NaCL syringe - perineural injection   20 mcg - 6/11/2025 1:01:00 PM  bupivacaine-EPINEPHrine (MARCAINE w/EPI) 0.5% -1:552017 Perineural - perineural injection   15 mL - 6/11/2025 1:01:00 PM

## 2025-06-12 ENCOUNTER — TELEPHONE (OUTPATIENT)
Dept: PRIMARY CARE | Facility: CLINIC | Age: 75
End: 2025-06-12
Payer: MEDICARE

## 2025-06-15 LAB
BACTERIA SPEC CULT: ABNORMAL
BACTERIA SPEC CULT: NORMAL
GRAM STN SPEC: ABNORMAL
GRAM STN SPEC: ABNORMAL
GRAM STN SPEC: NORMAL

## 2025-06-25 ENCOUNTER — TELEPHONE (OUTPATIENT)
Dept: ORTHOPEDIC SURGERY | Facility: HOSPITAL | Age: 75
End: 2025-06-25
Payer: MEDICARE

## 2025-06-25 NOTE — TELEPHONE ENCOUNTER
Copied from CRM #[id number]. Topic: Information Request - Trying to reach PCP  >> Jun 25, 2025 10:20 AM Johanna DISLA wrote:  Patient is requesting a callback regarding a 2wk post op visit for suture removal. Surg dt 6-11-25. I did not see anything within the 2wks

## 2025-07-01 ENCOUNTER — OFFICE VISIT (OUTPATIENT)
Dept: ORTHOPEDIC SURGERY | Facility: HOSPITAL | Age: 75
End: 2025-07-01
Payer: MEDICARE

## 2025-07-01 ENCOUNTER — HOSPITAL ENCOUNTER (OUTPATIENT)
Dept: RADIOLOGY | Facility: HOSPITAL | Age: 75
Discharge: HOME | End: 2025-07-01
Payer: MEDICARE

## 2025-07-01 VITALS — WEIGHT: 137 LBS | HEIGHT: 62 IN | BODY MASS INDEX: 25.21 KG/M2

## 2025-07-01 DIAGNOSIS — M79.602 LEFT ARM PAIN: ICD-10-CM

## 2025-07-01 DIAGNOSIS — M19.012 ARTHRITIS OF LEFT SHOULDER: ICD-10-CM

## 2025-07-01 DIAGNOSIS — M19.022 ARTHRITIS OF LEFT ELBOW: ICD-10-CM

## 2025-07-01 DIAGNOSIS — Z98.890 S/P ARTHROSCOPY OF RIGHT SHOULDER: Primary | ICD-10-CM

## 2025-07-01 PROCEDURE — 99212 OFFICE O/P EST SF 10 MIN: CPT

## 2025-07-01 PROCEDURE — 1160F RVW MEDS BY RX/DR IN RCRD: CPT | Performed by: NURSE PRACTITIONER

## 2025-07-01 PROCEDURE — 99024 POSTOP FOLLOW-UP VISIT: CPT | Performed by: NURSE PRACTITIONER

## 2025-07-01 PROCEDURE — 73080 X-RAY EXAM OF ELBOW: CPT | Mod: LEFT SIDE | Performed by: RADIOLOGY

## 2025-07-01 PROCEDURE — 73080 X-RAY EXAM OF ELBOW: CPT | Mod: LT

## 2025-07-01 PROCEDURE — 1036F TOBACCO NON-USER: CPT | Performed by: NURSE PRACTITIONER

## 2025-07-01 PROCEDURE — 1159F MED LIST DOCD IN RCRD: CPT | Performed by: NURSE PRACTITIONER

## 2025-07-01 PROCEDURE — 73030 X-RAY EXAM OF SHOULDER: CPT | Mod: LEFT SIDE | Performed by: RADIOLOGY

## 2025-07-01 PROCEDURE — 73030 X-RAY EXAM OF SHOULDER: CPT | Mod: LT

## 2025-07-01 PROCEDURE — 1125F AMNT PAIN NOTED PAIN PRSNT: CPT | Performed by: NURSE PRACTITIONER

## 2025-07-01 ASSESSMENT — PAIN - FUNCTIONAL ASSESSMENT: PAIN_FUNCTIONAL_ASSESSMENT: 0-10

## 2025-07-01 ASSESSMENT — PAIN DESCRIPTION - DESCRIPTORS: DESCRIPTORS: SORE

## 2025-07-01 ASSESSMENT — PAIN SCALES - GENERAL: PAINLEVEL_OUTOF10: 2

## 2025-07-01 NOTE — PROGRESS NOTES
Provider Impression/Assessment:  Pauline Orta is 3 weeks status post right arthroscopic debridement, decompression and culture/biopsy on 6/11/25. Intraoperative cultures negative, discussed with patient today. Left shoulder pain consistent with arthritis. Left elbow pain consistent with severe arthritis s/p radial head resection.     Patient Discussion/Plan:  She is going to work on range of motion at home for both of her shoulders at this point. She no longer needs the right shoulder sling. They can do range of motion exercises as tolerated now. We talked about scapular stabilizing exercises that they can do right now as well. Encouraged to use ice over shoulder to help reduce her tenderness. She will use light weight until her incisions are fully healed. Do not submerge in water for another 6 weeks. She can continue to wear her arm brace for the elbow as this helps reduce her forearm and elbow pain.     We will see Pauline Orta back in 6 weeks to repeat clinical check of right shoulder with Dr Simpson and review left elbow and left shoulder injection options.     All questions were answered today to the patient's satisfaction and they are comfortable with the above plan. Patient was discussed with Dr Simpson and he agrees with the above plan as well.     Should you have any questions or concerns after your visit today, please do not hesitate to call the office at [phone]. We strive to give you high-quality, patient-centered, collaborative care and I am honored to be a part of your health care team.    -------------------------------------------------------------------------------------------------  CHIEF COMPLAINT:  POV: RIGHT shoulder scope/culture  DOS: 6/11/25    History of Present Illness:  Pauline Orta is a pleasant 75 y.o. female who returns to clinic for the first postoperative visit. They are status post Right Shoulder Arthroscopic Decompression; Debridement; Culture & Biopsy,  done 6/11/25. States her right shoulder pain has mostly subsided, but the shoulder is tender to the touch around the healing incisions. The referred pain that she was getting down her arm and passed her right elbow is now gone. She is very happy with that result since surgery. She does endorse that she had a fall onto her left side when she slipped and fell when she was with a friend. She went to [facility] and had imaging completed and an acute injury was ruled out at that time. She continues to have complaints of left shoulder and left elbow and forearm pain. Her elbow and forearm are worse than the shoulder. Imaging of left shoulder and elbow were completed today as imagining was not able to be accessed from last week.     S/p radial head resection, left elbow    Review of systems for all 14 systems is negative for complaint except for the above noted findings in the history of present illness.    Family, social, and medical histories are obtained and reviewed.    Allergies:  Allergies[1]    Medications:  Current Medications[2]    Diagnoses/Problems:  Left shoulder pain  Left elbow pain  S/p right arthroscopy s/p shoulder replacement     Physical Examination:  Pauline Orta is a well-developed well-nourished female in no acute distress. Breathes with normal chest rises. Eye exam reveals round pupils. Awake alert and oriented x3.     Examination of Left shoulder reveals skin intact. No bruising. No swelling. Forward elevation to 160 degrees. External rotation to 50 degrees. Internal rotation to T12.     Examination of left elbow reveals skin intact. Well healed previous surgical incision. No warmth or redness. Range of motion  extension/flexion.     Patient right shoulder portal incisions are dry, intact and healing well. Minimal swelling. No warmth or erythema. No ecchymosis. Sutures were removed today and steris strips placed on incision sites on top of shoulder. Neurovascularly intact distally. 5 out of 5  wrist, hand and thumb flexion and extension without pain. Full active range of motion of elbow. Forward elevation to 140, passively to 150 degrees. External rotation to 10 degrees with stiffness passively. Internal rotation to lower level lumbar    Results/Imaging:  X-ray today of left shoulder shows no signs of any fracture or dislocation. Moderate glenohumeral arthritis.    Xrays today of left elbow show no signs of any fracture or dislocation. Severe arthritis with ossification. Status post radial head resection.   I personally spent time viewing digital images of the radiology testing with the patient. I explained the results to the patient, along with suggested explanation for follow up recommendations based on testing and clinical results. Radiology results discussed with Dr. Simpson.    *While intending to generate a timely document that accurately reflects the content of the visit, no guarantee can be provided that every grammatical or spelling mistake has been or will be identified or corrected. Thank you for your understanding.         [1]   Allergies  Allergen Reactions    Aspirin Other     hx gastric bypass; states history of stomach ulcer after taking aspirin    Iodinated Contrast Media Hives     Patient states she gets benadryl prior to any scans using IV contrast    Lithium Unknown     Muscle twitch occurred over 30 years ago    Shellfish Derived Angioedema     35 years ago    Adhesive Other     Redness - Irritation; does ok with IV tegaderm and paper tape and tele stickers    Caffeine Insomnia    House Dust Cough    Monosodium Glutamate Other and Headache     Pt states it just did not make her feel right after, stated felt squirrely   [2]   Current Outpatient Medications:     gabapentin (Neurontin) 800 mg tablet, TAKE 1 TABLET(800 MG) BY MOUTH THREE TIMES DAILY, Disp: 90 tablet, Rfl: 3    ascorbic acid (Vitamin C) 500 mg tablet, Take 1 tablet (500 mg) by mouth once daily., Disp: , Rfl:      benzonatate (Tessalon) 200 mg capsule, Take 1 capsule (200 mg) by mouth 3 times a day as needed for cough. Do not crush or chew. (Patient not taking: Reported on 6/11/2025), Disp: 30 capsule, Rfl: 1    busPIRone (Buspar) 5 mg tablet, TAKE 1 TABLET(5 MG) BY MOUTH TWICE DAILY AS NEEDED FOR ANXIETY, Disp: 60 tablet, Rfl: 1    calcium citrate-vitamin D2 250 mg-2.5 mcg (100 unit) tablet, Take 1 tablet by mouth once daily., Disp: , Rfl:     cholecalciferol (Vitamin D-3) 125 MCG (5000 UT) capsule, Take 1 capsule (125 mcg) by mouth once daily., Disp: , Rfl:     cyanocobalamin (Vitamin B-12) 250 mcg tablet, Take 1 tablet (250 mcg) by mouth once daily., Disp: , Rfl:     diclofenac sodium (Voltaren) 1 % gel gel, Apply 4.5 inches (4 g) topically 4 times a day as needed., Disp: , Rfl:     estradiol (Estrace) 0.01 % (0.1 mg/gram) vaginal cream, Insert 0.25 Applicatorfuls (1 g) into the vagina 2 times a week. INSERT 1 GRAM VAGINALLY 2 TIMES A WEEK (Patient not taking: Reported on 6/11/2025), Disp: 42.5 g, Rfl: 1    hydrOXYzine HCL (Atarax) 25 mg tablet, Take 1 tablet (25 mg) by mouth 3 times a day. (Patient not taking: Reported on 6/11/2025), Disp: 90 tablet, Rfl: 1    multivitamin tablet, Take 1 tablet by mouth once daily., Disp: , Rfl:     omega 3-dha-epa-fish oil (Fish OiL) 1,000 mg (120 mg-180 mg) capsule, Take 1 capsule (1,000 mg) by mouth once daily., Disp: , Rfl:     omeprazole (PriLOSEC) 20 mg DR capsule, Take 1 capsule (20 mg) by mouth 2 times a day before meals. Do not crush or chew., Disp: 180 capsule, Rfl: 1    ondansetron ODT (Zofran-ODT) 4 mg disintegrating tablet, DISSOLVE ONE TABLET ON/UNDER TONGUE EVERY 8 HOURS AS NEEDED FOR NAUSEA OR VOMITING FOR UP TO 7 DAYS, Disp: 20 tablet, Rfl: 0    polyethylene glycol (Glycolax, Miralax) 17 gram packet, Take 17 g by mouth once daily., Disp: 90 packet, Rfl: 1    Restasis 0.05 % ophthalmic emulsion, Administer 1 drop into both eyes 2 times a day as needed (dry eyes)., Disp: ,  Rfl:     rimegepant (Nurtec ODT) 75 mg tablet,disintegrating, Dissolve 1 tablet (75 mg) in the mouth every other day., Disp: 27 tablet, Rfl: 1    tiZANidine (Zanaflex) 4 mg tablet, Take 1 tablet (4 mg) by mouth every 8 hours if needed for muscle spasms., Disp: 90 tablet, Rfl: 2    topiramate (Topamax) 25 mg tablet, Take 1 tablet (25 mg) by mouth once daily., Disp: 90 tablet, Rfl: 1    TUMERIC-GING-OLIVE-OREG-CAPRYL ORAL, Take 1 mg by mouth once daily., Disp: , Rfl:

## 2025-07-16 DIAGNOSIS — G89.29 OTHER CHRONIC PAIN: ICD-10-CM

## 2025-07-17 RX ORDER — TIZANIDINE 4 MG/1
4 TABLET ORAL EVERY 8 HOURS PRN
Qty: 90 TABLET | Refills: 1 | Status: SHIPPED | OUTPATIENT
Start: 2025-07-17

## 2025-07-21 DIAGNOSIS — F41.9 ANXIETY: ICD-10-CM

## 2025-07-21 DIAGNOSIS — G43.909 MIGRAINE WITHOUT STATUS MIGRAINOSUS, NOT INTRACTABLE, UNSPECIFIED MIGRAINE TYPE: ICD-10-CM

## 2025-07-21 NOTE — TELEPHONE ENCOUNTER
PT REQUESTING REFILL HYDROXYZINE 3 TIMES DAILY WAS 25 MG ASKING FOR AN INCREASE ASAP. ALSO STATES DULOXETIN IS SUPPOSED TO BE 60 MG NOT 40 MG ASKING THE NEXT REFILL REQUESTING 60 MG STATES IT IS AN IMMEDIATE PROBLEM CAUSING HIGH LEVEL OF ANXIETY DUE TO [facility]

## 2025-07-23 RX ORDER — DULOXETIN HYDROCHLORIDE 60 MG/1
60 CAPSULE, DELAYED RELEASE ORAL DAILY
Qty: 90 CAPSULE | Refills: 1 | Status: SHIPPED | OUTPATIENT
Start: 2025-07-23 | End: 2026-07-23

## 2025-07-23 RX ORDER — HYDROXYZINE HYDROCHLORIDE 50 MG/1
50 TABLET, FILM COATED ORAL EVERY 8 HOURS PRN
Qty: 90 TABLET | Refills: 5 | Status: SHIPPED | OUTPATIENT
Start: 2025-07-23 | End: 2026-07-23

## 2025-08-04 RX ORDER — TOPIRAMATE 25 MG/1
25 TABLET, FILM COATED ORAL DAILY
Qty: 90 TABLET | Refills: 1 | Status: SHIPPED | OUTPATIENT
Start: 2025-08-04 | End: 2026-08-04

## 2025-08-04 NOTE — TELEPHONE ENCOUNTER
Patient pharmacy requesting refill on Topiramate 25mg tab. Med last filled 02/2025 for a 90day supply with 1refill. Please advise.

## 2025-08-06 ENCOUNTER — EVALUATION (OUTPATIENT)
Dept: PHYSICAL THERAPY | Facility: CLINIC | Age: 75
End: 2025-08-06
Payer: MEDICARE

## 2025-08-06 DIAGNOSIS — R29.898 WEAKNESS OF RIGHT SHOULDER: ICD-10-CM

## 2025-08-06 DIAGNOSIS — M25.511 RIGHT SHOULDER PAIN: ICD-10-CM

## 2025-08-06 DIAGNOSIS — M25.611 SHOULDER STIFFNESS, RIGHT: Primary | ICD-10-CM

## 2025-08-06 DIAGNOSIS — M54.12 CERVICAL RADICULOPATHY: ICD-10-CM

## 2025-08-06 PROCEDURE — 97161 PT EVAL LOW COMPLEX 20 MIN: CPT | Mod: GP

## 2025-08-06 NOTE — PROGRESS NOTES
"Physical Therapy Evaluation and Treatment    Patient Name: Pauline Orta \"Megan"  MRN: [id number]  YOB: 1950  Encounter Date: 8/6/2025    Time Entry:  Time Calculation  Start Time: 1419  Stop Time: 1509  Time Calculation (min): 50 min  PT Evaluation Time Entry  PT Evaluation (Low) Time Entry: 50                      Rehab Insurance Information: 08/01/20205 ANTHEM MCR ADV: TS: AUTH REQUIRED/ 100% C[place]AGE/ [place] / [place]9350 OOP (NOT MET)/ NOTES: calendar year benefit/  REF# [id number] Conversation ID: [id number] 2.) [facility] MD/AUTH REQUIRED/100% COVERAGE/ [id number]         5/12/25 - PT EXTENDED AUTH RECEIVED FOR 4 VISITS FOR 29615, 99227, 69183, 61206 FROM 5/1/25 TO 5/20/25 AUTH # [id number]  JS   *25043 & 20561 DENIED*     PT: 7 visits 2/25-4/25 CPTs 55207 74906 47117 25902   OT: 8 VISITS FOR 019340, 84113, 47792, 87883, 01932, 73464 FROM 1/7/25 TO 3/7/25                        Rehab Falls Risk Assessment:  Fall Risk Indicated: No    Problem List Items Addressed This Visit           ICD-10-CM    Cervical radiculopathy M54.12     Other Visit Diagnoses         Codes      Shoulder stiffness, right    -  Primary M25.611    Relevant Orders    Follow Up In Physical Therapy      Right shoulder pain     M25.511    Relevant Orders    Follow Up In Physical Therapy      Weakness of right shoulder     R29.898    Relevant Orders    Follow Up In Physical Therapy            Precautions       Orthosis Precautions Comment: status post right arthroscopic debridement, decompression and culture/biopsy on 6/11/2  Precautions:  status post right arthroscopic debridement, decompression and culture/biopsy on 6/11/25   Subjective      History of Present Illness  Pauline \"Megan" is a 75 y.o. female who reports to physical therapy with a chief concern of R shoulder.                 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 She denied NYASIA. Pt had a 2nd surgery on 6/11/25. She stated that something was wrong shortly after the rTSA. Pt went through PT, but still struggled with pain and issues lifting her arm. She had to stop PT due to personal issues. Pt went to a new surgeon and had a right arthroscopic debridement, decompression and culture/biopsy on 6/11/25. Her pain has improved, but still struggles with lifting and using her arm. Does have history of cervical fusion. Pt declined to fill out intake form stating we have the information already     Pain          Location: R shoulder/neck; pt did not rate, but pain present         Treatment History  Discharged From Past 30 Days: Outpatient therapy                   Objective   Outcome Measure Comments: UEFI: 38     Shoulder Range of Motion  Right Shoulder   Active (deg) Passive (deg) Pain   Flexion 90       Extension         Scaption         ABduction 62       ADduction         Horizontal ABduction         Horizontal ADduction         External Rotation (Shoulder ABducted 0 degrees) 15       External Rotation (Shoulder ABducted 45 degrees)         External Rotation (Shoulder ABducted 90 degrees)         Internal Rotation (Shoulder ABducted 0 degrees)         Internal Rotation (Shoulder ABducted 45 degrees)         Internal Rotation (Shoulder ABducted 90 degrees)           Left Shoulder   Active (deg) Passive (deg) Pain   Flexion 130       Extension         Scaption         ABduction 120       ADduction         Horizontal ABduction         Horizontal ADduction         External Rotation (Shoulder ABducted 0 degrees) 75       External Rotation (Shoulder ABducted 45 degrees)         External Rotation (Shoulder ABducted 90 degrees)         Internal Rotation (Shoulder ABducted 0 degrees)         Internal Rotation (Shoulder ABducted 45 degrees)         Internal Rotation (Shoulder ABducted 90 degrees)           R shoulder BTB: R  buttock  PROM limited due to pain  "         Shoulder Strength - Planes of Motion   Right Strength Right Pain Left Strength Left Pain   Flexion 2-         Extension           ABduction 2-         ADduction           Horizontal ABduction           Horizontal ADduction           Internal Rotation 0° 3         Internal Rotation 90°           External Rotation 0° 4         External Rotation 90°                            Activities                                                           Assessment/Plan   Assessment  Pauline \"Megan" presents with a condition of Low complexity.           Functional Limitations: Functional mobility, Pain with ADLs/IADLs, Pain when reaching, Range of motion  Impairments: Abnormal or restricted range of motion, Impaired physical strength, Pain with functional activity    Patient Goal for Therapy (PT): pt stated she wanted to regain use of her RUE  Prognosis: Good  Assessment Details: Pt demonstrated limitations in strength and AROM of R shoulder as above. Does report pain that may be related to history of cervical pathology and previous fusion. Recommend strengthening and stretching of R shoulder, cervical, and postural muscles as able to improve function. IE limited due to pt discussing personal issues. Pt denied needing assistance with matter     Goals  Active       PT Goal 1       Start:  08/06/25    Expected End:  09/03/25       Pt will be independent with HEP to ensure benefit outside of PT    Pt will improve AROM of R shoulder flex to at least 100 degrees to allow pt to be able to lift, groom, perform ADLs with less difficulty         PT Goal 2       Start:  08/06/25    Expected End:  10/01/25       Pt will improve AROM of R shoulder flex to at least 110 degrees, abd to at least 90 degrees, ER at 0 to at least 30 degrees, and  BTB IR to at least L1 to allow pt to be able to lift, groom, perform ADLs with less difficulty    Pt will improve strength of R shoulder to at least 4/5 to allow pt to be able to lift, groom, " perform ADLs with less difficulty    Pt will improve score on UEFI by at least 15 points to allow pt to be able to lift, groom, perform ADLs with less difficulty    Pt will report minimal to no pain with lifting RUE to allow pt to be able to lift, groom, perform ADLs with less difficulty            Education  Education was done with Patient.           Pt educated on HEP-given handout, POC, assessment     Plan  From a physical therapy perspective, the patient would benefit from: Skilled Rehab Services    Planned therapy interventions include: Therapeutic exercise, Therapeutic activities, Neuromuscular re-education, and Manual therapy.              for 8 Weeks.       This plan was discussed with Patient.   Plan details: 1-2 times per week for 8 weeks

## 2025-08-06 NOTE — PATIENT INSTRUCTIONS
Access Code: [email or web address]  URL: [email or web address]  Date: 08/06/2025  Prepared by: Julián Liao    Exercises  - Seated Cervical Retraction  - 1-3 x daily - 7 x weekly - 1-2 sets - 10 reps  - Gentle Levator Scapulae Stretch  - 1-3 x daily - 7 x weekly - 3 sets - 30seconds hold  - Seated Upper Trapezius Stretch  - 1-3 x daily - 7 x weekly - 3 sets - 30seconds hold  - Seated Scapular Retraction  - 1-3 x daily - 7 x weekly - 3 sets - 10 reps  - Standing Shoulder External Rotation Stretch in Doorway  - 1-3 x daily - 7 x weekly - 3 sets - 30seconds hold  - Seated Shoulder Abduction Towel Slide at Table Top  - 1-3 x daily - 7 x weekly - 3 sets - 10 reps  - Shoulder Flexion Wall Slide with Towel  - 1-3 x daily - 7 x weekly - 3 sets - 10 reps  - Standing Bilateral Shoulder Internal Rotation AAROM with Dowel  - 1-3 x daily - 7 x weekly - 3 sets - 10 reps  - Standing Isometric Shoulder Internal Rotation at Doorway  - 1-3 x daily - 7 x weekly - 1-2 sets - 10 reps  - Standing Isometric Shoulder External Rotation with Doorway  - 1-3 x daily - 7 x weekly - 1-2 sets - 10 reps  - Standing Isometric Shoulder Extension with Doorway - Arm Bent  - 1-3 x daily - 7 x weekly - 1-2 sets - 10 reps  - Standing Isometric Shoulder Flexion with Doorway - Arm Bent  - 1-3 x daily - 7 x weekly - 1-2 sets - 10 reps  - Standing Isometric Shoulder Abduction with Doorway - Arm Bent  - 1-3 x daily - 7 x weekly - 1-2 sets - 10 reps

## 2025-08-07 ENCOUNTER — TELEPHONE (OUTPATIENT)
Dept: PHYSICAL THERAPY | Facility: CLINIC | Age: 75
End: 2025-08-07
Payer: MEDICARE

## 2025-08-07 DIAGNOSIS — M54.12 CERVICAL RADICULOPATHY: ICD-10-CM

## 2025-08-07 NOTE — TELEPHONE ENCOUNTER
Called patient to set up PT. Patient stated that she would have to give us a call right back. Hasn't called back yet.

## 2025-08-12 ENCOUNTER — APPOINTMENT (OUTPATIENT)
Dept: ORTHOPEDIC SURGERY | Facility: HOSPITAL | Age: 75
End: 2025-08-12
Payer: MEDICARE

## 2025-08-18 ENCOUNTER — APPOINTMENT (OUTPATIENT)
Dept: ORTHOPEDIC SURGERY | Facility: CLINIC | Age: 75
End: 2025-08-18
Payer: MEDICARE

## 2025-08-21 ENCOUNTER — TREATMENT (OUTPATIENT)
Dept: PHYSICAL THERAPY | Facility: CLINIC | Age: 75
End: 2025-08-21
Payer: MEDICARE

## 2025-08-21 DIAGNOSIS — M25.511 RIGHT SHOULDER PAIN: ICD-10-CM

## 2025-08-21 DIAGNOSIS — R29.898 WEAKNESS OF RIGHT SHOULDER: ICD-10-CM

## 2025-08-21 DIAGNOSIS — M54.12 CERVICAL RADICULOPATHY: ICD-10-CM

## 2025-08-21 DIAGNOSIS — M25.611 SHOULDER STIFFNESS, RIGHT: ICD-10-CM

## 2025-08-21 PROCEDURE — 97110 THERAPEUTIC EXERCISES: CPT | Mod: GP

## 2025-08-26 ENCOUNTER — APPOINTMENT (OUTPATIENT)
Dept: PHYSICAL THERAPY | Facility: CLINIC | Age: 75
End: 2025-08-26
Payer: MEDICARE

## 2025-08-26 DIAGNOSIS — M54.12 CERVICAL RADICULOPATHY: ICD-10-CM

## 2025-09-02 ENCOUNTER — DOCUMENTATION (OUTPATIENT)
Dept: PHYSICAL THERAPY | Facility: CLINIC | Age: 75
End: 2025-09-02
Payer: MEDICARE

## 2025-09-02 DIAGNOSIS — M54.12 CERVICAL RADICULOPATHY: ICD-10-CM

## 2025-09-04 ENCOUNTER — DOCUMENTATION (OUTPATIENT)
Dept: PHYSICAL THERAPY | Facility: CLINIC | Age: 75
End: 2025-09-04
Payer: MEDICARE

## 2025-09-04 DIAGNOSIS — M54.12 CERVICAL RADICULOPATHY: ICD-10-CM

## (undated) DEVICE — DRILL BIT, 3.0MM GLENOID, V-SHAPE FLUTE, STERILE

## (undated) DEVICE — DRAPE, INSTRUMENT, W/POUCH, STERI DRAPE, 7 X 11 IN, DISPOSABLE, STERILE

## (undated) DEVICE — GLOVE, SURGICAL, PROTEXIS PI MICRO, 8.5, PF, LF

## (undated) DEVICE — NEEDLE, SCORPION MULTIFIRE

## (undated) DEVICE — Device

## (undated) DEVICE — KIT, BEACH CHAIR TRIMANO

## (undated) DEVICE — SUTURE, ETHILON, 3-0, 30 IN, FS-1, BLACK

## (undated) DEVICE — STOCKINETTE, IMPERVIOUS, 12 X 48 IN, LF, STERILE

## (undated) DEVICE — SUTURE, VICRYL, 0, 36 IN, CT-1, UNDYED

## (undated) DEVICE — EXCAL 4MM X 13CM SINGLE

## (undated) DEVICE — TUBING, PATIENT 8FT STERILE

## (undated) DEVICE — PACK, BEACH CHAIR SHOULDER

## (undated) DEVICE — TUBING, IRRIGATION, HIGH FLOW, HAND PIECE SET

## (undated) DEVICE — ELECTRODE, ELECTROSURGICAL, BLADE, INSULATED, ENT/IMA, STERILE

## (undated) DEVICE — GLOVE, SURGICAL, PROTEXIS PI ORTHO, 8.5, PF, LF

## (undated) DEVICE — STRIP, SKIN CLOSURE, STERI STRIP, REINFORCED, 0.5 X 4 IN

## (undated) DEVICE — DRAPE, SHEET, U, STERI DRAPE, 47 X 51 IN, DISPOSABLE, STERILE

## (undated) DEVICE — DRAPE, SHEET, U, W/ADHESIVE STRIP, IMPERVIOUS, 60 X 70 IN, DISPOSABLE, LF, STERILE

## (undated) DEVICE — SUTURE, MONOCRYL, 4-0, 27 IN, PS-2, UNDYED

## (undated) DEVICE — DRESSING, SILVERLON FLEXIBLE ISLAND, 4 X 11IN

## (undated) DEVICE — MAT, FLOOR, SURGISAFE, FLUID CONTROL, 46X40

## (undated) DEVICE — TUBING, DUAL WAVE, OUTFLOW

## (undated) DEVICE — DRAPE, SHEET, 17 X 23 IN

## (undated) DEVICE — TUBING, SUCTION, NON-CONDUCTIVE, W/CONNECT,.25 IN X 12 FT, STERILE, LF

## (undated) DEVICE — IMMOBILIZER, ULTRASLING III, MEDIUM

## (undated) DEVICE — DRAPE, SHEET, LARGE, 70 X 85IN, STERILE

## (undated) DEVICE — PAD, GROUNDING, ELECTROSURGICAL, W/9 FT CABLE, POLYHESIVE II, ADULT, LF

## (undated) DEVICE — WOUND SYSTEM, DEBRIDEMENT & CLEANING, O.R DUOPAK

## (undated) DEVICE — ADAPTER, Y TUBING STERILE

## (undated) DEVICE — CANNULA, FLEXIBLE 6.5 X 72 THREAD CLEAR TRAC

## (undated) DEVICE — BLADE, SAW, SAGITTAL, 25 X 73 X 1.24MM, SS, STERILE

## (undated) DEVICE — WAND, COBLATION, WEREWOLF FLOW 90

## (undated) DEVICE — BANDAGE, COBAN 2, LAYER LITE COMPRESSION, 4 IN, LF

## (undated) DEVICE — SUTURE, VICRYL, 3-0, 27 IN, CT-1, UNDYED

## (undated) DEVICE — TUBING, PUMP REDEUCE 8FT STERILE

## (undated) DEVICE — RETRIEVER, SUTURE, HEWSON

## (undated) DEVICE — MASK, FACE, TENET, FOAM POSITIONING, DISPOSABLE

## (undated) DEVICE — DRESSING, MEPILEX BORDER, POST-OP AG, 4 X 8 IN

## (undated) DEVICE — DRESSING, GAUZE, PETROLATUM, PATCH, XEROFORM, 1 X 8 IN, STERILE